# Patient Record
Sex: FEMALE | Race: WHITE | Employment: UNEMPLOYED
[De-identification: names, ages, dates, MRNs, and addresses within clinical notes are randomized per-mention and may not be internally consistent; named-entity substitution may affect disease eponyms.]

---

## 2017-02-01 ENCOUNTER — OFFICE VISIT (OUTPATIENT)
Dept: FAMILY MEDICINE CLINIC | Facility: CLINIC | Age: 37
End: 2017-02-01

## 2017-02-01 VITALS
DIASTOLIC BLOOD PRESSURE: 60 MMHG | BODY MASS INDEX: 34.22 KG/M2 | OXYGEN SATURATION: 95 % | WEIGHT: 239 LBS | HEART RATE: 60 BPM | SYSTOLIC BLOOD PRESSURE: 122 MMHG | TEMPERATURE: 97.6 F | HEIGHT: 70 IN

## 2017-02-01 DIAGNOSIS — F41.9 ANXIETY: Primary | ICD-10-CM

## 2017-02-01 DIAGNOSIS — N89.8 VAGINAL ODOR: ICD-10-CM

## 2017-02-01 DIAGNOSIS — N76.0 ACUTE VAGINITIS: ICD-10-CM

## 2017-02-01 PROCEDURE — 99214 OFFICE O/P EST MOD 30 MIN: CPT | Performed by: PHYSICIAN ASSISTANT

## 2017-02-01 RX ORDER — METRONIDAZOLE 500 MG/1
500 TABLET ORAL 2 TIMES DAILY
Qty: 14 TABLET | Refills: 0 | Status: SHIPPED | OUTPATIENT
Start: 2017-02-01 | End: 2017-02-08

## 2017-02-01 RX ORDER — ESCITALOPRAM OXALATE 10 MG/1
10 TABLET ORAL DAILY
Qty: 30 TABLET | Refills: 3 | Status: SHIPPED | OUTPATIENT
Start: 2017-02-01 | End: 2017-05-31

## 2017-02-01 RX ORDER — ALPRAZOLAM 0.5 MG/1
0.5 TABLET ORAL DAILY PRN
Qty: 30 TABLET | Refills: 0 | Status: SHIPPED | OUTPATIENT
Start: 2017-02-01 | End: 2017-03-09 | Stop reason: SDUPTHER

## 2017-02-01 ASSESSMENT — ENCOUNTER SYMPTOMS
NAUSEA: 0
SHORTNESS OF BREATH: 0
CONSTIPATION: 0
COUGH: 0
ABDOMINAL PAIN: 0
WHEEZING: 0
VISUAL CHANGE: 0
COLOR CHANGE: 0
DIARRHEA: 0
VOMITING: 0

## 2017-02-02 ENCOUNTER — TELEPHONE (OUTPATIENT)
Dept: FAMILY MEDICINE CLINIC | Facility: CLINIC | Age: 37
End: 2017-02-02

## 2017-03-05 ENCOUNTER — HOSPITAL ENCOUNTER (EMERGENCY)
Age: 37
Discharge: HOME | End: 2017-03-05
Attending: EMERGENCY MEDICINE
Payer: MEDICARE

## 2017-03-05 ENCOUNTER — APPOINTMENT (OUTPATIENT)
Dept: CT IMAGING | Age: 37
End: 2017-03-05
Payer: MEDICARE

## 2017-03-05 VITALS
HEIGHT: 69 IN | DIASTOLIC BLOOD PRESSURE: 76 MMHG | TEMPERATURE: 98 F | HEART RATE: 55 BPM | OXYGEN SATURATION: 99 % | SYSTOLIC BLOOD PRESSURE: 132 MMHG | BODY MASS INDEX: 33.18 KG/M2 | RESPIRATION RATE: 20 BRPM | WEIGHT: 224 LBS

## 2017-03-05 DIAGNOSIS — M43.6 TORTICOLLIS: Primary | ICD-10-CM

## 2017-03-05 LAB
-: NORMAL
HCG, PREGNANCY URINE (POC): NEGATIVE
PREGNANCY TEST URINE, POC: NEGATIVE

## 2017-03-05 PROCEDURE — 84703 CHORIONIC GONADOTROPIN ASSAY: CPT

## 2017-03-05 PROCEDURE — 70450 CT HEAD/BRAIN W/O DYE: CPT

## 2017-03-05 PROCEDURE — 6370000000 HC RX 637 (ALT 250 FOR IP): Performed by: NURSE PRACTITIONER

## 2017-03-05 PROCEDURE — 99284 EMERGENCY DEPT VISIT MOD MDM: CPT

## 2017-03-05 PROCEDURE — 6360000002 HC RX W HCPCS: Performed by: NURSE PRACTITIONER

## 2017-03-05 RX ORDER — ONDANSETRON 4 MG/1
4 TABLET, ORALLY DISINTEGRATING ORAL ONCE
Status: COMPLETED | OUTPATIENT
Start: 2017-03-05 | End: 2017-03-05

## 2017-03-05 RX ORDER — TRAMADOL HYDROCHLORIDE 50 MG/1
50 TABLET ORAL EVERY 6 HOURS PRN
Qty: 15 TABLET | Refills: 0 | Status: SHIPPED | OUTPATIENT
Start: 2017-03-05 | End: 2017-03-09 | Stop reason: SDUPTHER

## 2017-03-05 RX ORDER — DIAZEPAM 5 MG/1
5 TABLET ORAL ONCE
Status: COMPLETED | OUTPATIENT
Start: 2017-03-05 | End: 2017-03-05

## 2017-03-05 RX ORDER — OXYCODONE HYDROCHLORIDE AND ACETAMINOPHEN 5; 325 MG/1; MG/1
1 TABLET ORAL ONCE
Status: COMPLETED | OUTPATIENT
Start: 2017-03-05 | End: 2017-03-05

## 2017-03-05 RX ORDER — CYCLOBENZAPRINE HCL 10 MG
10 TABLET ORAL 3 TIMES DAILY PRN
Qty: 15 TABLET | Refills: 0 | Status: SHIPPED | OUTPATIENT
Start: 2017-03-05 | End: 2017-03-09 | Stop reason: ALTCHOICE

## 2017-03-05 RX ADMIN — OXYCODONE HYDROCHLORIDE AND ACETAMINOPHEN 1 TABLET: 5; 325 TABLET ORAL at 16:20

## 2017-03-05 RX ADMIN — DIAZEPAM 5 MG: 5 TABLET ORAL at 16:20

## 2017-03-05 RX ADMIN — ONDANSETRON 4 MG: 4 TABLET, ORALLY DISINTEGRATING ORAL at 16:20

## 2017-03-05 ASSESSMENT — ENCOUNTER SYMPTOMS
NAUSEA: 0
COUGH: 0
SHORTNESS OF BREATH: 0
TROUBLE SWALLOWING: 0
VOMITING: 0
BACK PAIN: 0

## 2017-03-05 ASSESSMENT — PAIN SCALES - GENERAL
PAINLEVEL_OUTOF10: 8
PAINLEVEL_OUTOF10: 8

## 2017-03-06 ENCOUNTER — APPOINTMENT (OUTPATIENT)
Dept: CT IMAGING | Age: 37
End: 2017-03-06
Payer: MEDICARE

## 2017-03-06 ENCOUNTER — HOSPITAL ENCOUNTER (EMERGENCY)
Age: 37
Discharge: HOME | End: 2017-03-07
Attending: EMERGENCY MEDICINE
Payer: MEDICARE

## 2017-03-06 VITALS
HEIGHT: 69 IN | SYSTOLIC BLOOD PRESSURE: 136 MMHG | DIASTOLIC BLOOD PRESSURE: 74 MMHG | OXYGEN SATURATION: 100 % | HEART RATE: 91 BPM | WEIGHT: 224 LBS | TEMPERATURE: 97.7 F | RESPIRATION RATE: 16 BRPM | BODY MASS INDEX: 33.18 KG/M2

## 2017-03-06 DIAGNOSIS — G44.209 ACUTE NON INTRACTABLE TENSION-TYPE HEADACHE: ICD-10-CM

## 2017-03-06 DIAGNOSIS — M43.6 TORTICOLLIS: Primary | ICD-10-CM

## 2017-03-06 LAB
ABSOLUTE EOS #: 0.2 K/UL (ref 0–0.4)
ABSOLUTE LYMPH #: 1.4 K/UL (ref 1–4.8)
ABSOLUTE MONO #: 0.6 K/UL (ref 0.1–1.3)
ANION GAP SERPL CALCULATED.3IONS-SCNC: 12 MMOL/L (ref 9–17)
BASOPHILS # BLD: 0 % (ref 0–2)
BASOPHILS ABSOLUTE: 0 K/UL (ref 0–0.2)
BUN BLDV-MCNC: 8 MG/DL (ref 6–20)
BUN/CREAT BLD: ABNORMAL (ref 9–20)
C-REACTIVE PROTEIN: 5 MG/L (ref 0–5)
CALCIUM SERPL-MCNC: 9 MG/DL (ref 8.6–10.4)
CHLORIDE BLD-SCNC: 102 MMOL/L (ref 98–107)
CO2: 25 MMOL/L (ref 20–31)
CREAT SERPL-MCNC: 0.48 MG/DL (ref 0.5–0.9)
DIFFERENTIAL TYPE: ABNORMAL
EOSINOPHILS RELATIVE PERCENT: 2 % (ref 0–4)
GFR AFRICAN AMERICAN: >60 ML/MIN
GFR NON-AFRICAN AMERICAN: >60 ML/MIN
GFR SERPL CREATININE-BSD FRML MDRD: ABNORMAL ML/MIN/{1.73_M2}
GFR SERPL CREATININE-BSD FRML MDRD: ABNORMAL ML/MIN/{1.73_M2}
GLUCOSE BLD-MCNC: 99 MG/DL (ref 70–99)
HCT VFR BLD CALC: 36.4 % (ref 36–46)
HEMOGLOBIN: 12.7 G/DL (ref 12–16)
LYMPHOCYTES # BLD: 16 % (ref 24–44)
MCH RBC QN AUTO: 32.4 PG (ref 26–34)
MCHC RBC AUTO-ENTMCNC: 34.8 G/DL (ref 31–37)
MCV RBC AUTO: 93.1 FL (ref 80–100)
MONOCYTES # BLD: 7 % (ref 1–7)
PDW BLD-RTO: 13.8 % (ref 11.5–14.9)
PLATELET # BLD: 203 K/UL (ref 150–450)
PLATELET ESTIMATE: ABNORMAL
PMV BLD AUTO: 10.5 FL (ref 6–12)
POTASSIUM SERPL-SCNC: 3.8 MMOL/L (ref 3.7–5.3)
RBC # BLD: 3.91 M/UL (ref 4–5.2)
RBC # BLD: ABNORMAL 10*6/UL
SEG NEUTROPHILS: 75 % (ref 36–66)
SEGMENTED NEUTROPHILS ABSOLUTE COUNT: 6.4 K/UL (ref 1.3–9.1)
SODIUM BLD-SCNC: 139 MMOL/L (ref 135–144)
WBC # BLD: 8.7 K/UL (ref 3.5–11)
WBC # BLD: ABNORMAL 10*3/UL

## 2017-03-06 PROCEDURE — 70496 CT ANGIOGRAPHY HEAD: CPT

## 2017-03-06 PROCEDURE — 80048 BASIC METABOLIC PNL TOTAL CA: CPT

## 2017-03-06 PROCEDURE — 6360000002 HC RX W HCPCS: Performed by: PHYSICIAN ASSISTANT

## 2017-03-06 PROCEDURE — 85025 COMPLETE CBC W/AUTO DIFF WBC: CPT

## 2017-03-06 PROCEDURE — 36415 COLL VENOUS BLD VENIPUNCTURE: CPT

## 2017-03-06 PROCEDURE — 96375 TX/PRO/DX INJ NEW DRUG ADDON: CPT

## 2017-03-06 PROCEDURE — 70498 CT ANGIOGRAPHY NECK: CPT

## 2017-03-06 PROCEDURE — 86140 C-REACTIVE PROTEIN: CPT

## 2017-03-06 PROCEDURE — 72125 CT NECK SPINE W/O DYE: CPT

## 2017-03-06 PROCEDURE — 6360000004 HC RX CONTRAST MEDICATION: Performed by: PHYSICIAN ASSISTANT

## 2017-03-06 PROCEDURE — 2580000003 HC RX 258: Performed by: PHYSICIAN ASSISTANT

## 2017-03-06 PROCEDURE — 96376 TX/PRO/DX INJ SAME DRUG ADON: CPT

## 2017-03-06 PROCEDURE — 96372 THER/PROPH/DIAG INJ SC/IM: CPT

## 2017-03-06 PROCEDURE — 96374 THER/PROPH/DIAG INJ IV PUSH: CPT

## 2017-03-06 PROCEDURE — 99284 EMERGENCY DEPT VISIT MOD MDM: CPT

## 2017-03-06 RX ORDER — SODIUM CHLORIDE 0.9 % (FLUSH) 0.9 %
10 SYRINGE (ML) INJECTION PRN
Status: DISCONTINUED | OUTPATIENT
Start: 2017-03-06 | End: 2017-03-07 | Stop reason: HOSPADM

## 2017-03-06 RX ORDER — FENTANYL CITRATE 50 UG/ML
50 INJECTION, SOLUTION INTRAMUSCULAR; INTRAVENOUS ONCE
Status: COMPLETED | OUTPATIENT
Start: 2017-03-06 | End: 2017-03-06

## 2017-03-06 RX ORDER — DIAZEPAM 5 MG/ML
5 INJECTION, SOLUTION INTRAMUSCULAR; INTRAVENOUS ONCE
Status: COMPLETED | OUTPATIENT
Start: 2017-03-06 | End: 2017-03-06

## 2017-03-06 RX ORDER — ORPHENADRINE CITRATE 100 MG/1
100 TABLET, EXTENDED RELEASE ORAL 2 TIMES DAILY
Qty: 15 TABLET | Refills: 0 | Status: SHIPPED | OUTPATIENT
Start: 2017-03-06 | End: 2017-03-09 | Stop reason: SDUPTHER

## 2017-03-06 RX ORDER — ONDANSETRON 2 MG/ML
4 INJECTION INTRAMUSCULAR; INTRAVENOUS ONCE
Status: COMPLETED | OUTPATIENT
Start: 2017-03-06 | End: 2017-03-06

## 2017-03-06 RX ORDER — 0.9 % SODIUM CHLORIDE 0.9 %
100 INTRAVENOUS SOLUTION INTRAVENOUS ONCE
Status: COMPLETED | OUTPATIENT
Start: 2017-03-06 | End: 2017-03-06

## 2017-03-06 RX ORDER — FENTANYL CITRATE 50 UG/ML
100 INJECTION, SOLUTION INTRAMUSCULAR; INTRAVENOUS ONCE
Status: DISCONTINUED | OUTPATIENT
Start: 2017-03-06 | End: 2017-03-06

## 2017-03-06 RX ORDER — OXYCODONE HYDROCHLORIDE AND ACETAMINOPHEN 5; 325 MG/1; MG/1
1 TABLET ORAL EVERY 4 HOURS PRN
Qty: 10 TABLET | Refills: 0 | Status: SHIPPED | OUTPATIENT
Start: 2017-03-06 | End: 2017-03-09 | Stop reason: ALTCHOICE

## 2017-03-06 RX ORDER — ORPHENADRINE CITRATE 30 MG/ML
60 INJECTION INTRAMUSCULAR; INTRAVENOUS ONCE
Status: COMPLETED | OUTPATIENT
Start: 2017-03-06 | End: 2017-03-06

## 2017-03-06 RX ORDER — MORPHINE SULFATE 4 MG/ML
4 INJECTION, SOLUTION INTRAMUSCULAR; INTRAVENOUS ONCE
Status: COMPLETED | OUTPATIENT
Start: 2017-03-06 | End: 2017-03-06

## 2017-03-06 RX ORDER — NAPROXEN 500 MG/1
500 TABLET ORAL 2 TIMES DAILY
Qty: 30 TABLET | Refills: 0 | Status: SHIPPED | OUTPATIENT
Start: 2017-03-06 | End: 2017-03-06 | Stop reason: CLARIF

## 2017-03-06 RX ORDER — ONDANSETRON 4 MG/1
4 TABLET, ORALLY DISINTEGRATING ORAL EVERY 8 HOURS PRN
Qty: 8 TABLET | Refills: 0 | Status: SHIPPED | OUTPATIENT
Start: 2017-03-06 | End: 2017-03-09 | Stop reason: SDUPTHER

## 2017-03-06 RX ORDER — 0.9 % SODIUM CHLORIDE 0.9 %
1000 INTRAVENOUS SOLUTION INTRAVENOUS ONCE
Status: COMPLETED | OUTPATIENT
Start: 2017-03-06 | End: 2017-03-06

## 2017-03-06 RX ORDER — ACETAMINOPHEN AND CODEINE PHOSPHATE 300; 30 MG/1; MG/1
1 TABLET ORAL EVERY 4 HOURS PRN
Qty: 10 TABLET | Refills: 0 | Status: SHIPPED | OUTPATIENT
Start: 2017-03-06 | End: 2017-03-06 | Stop reason: CLARIF

## 2017-03-06 RX ADMIN — SODIUM CHLORIDE 100 ML: 9 INJECTION, SOLUTION INTRAVENOUS at 22:58

## 2017-03-06 RX ADMIN — MORPHINE SULFATE 4 MG: 4 INJECTION, SOLUTION INTRAMUSCULAR; INTRAVENOUS at 20:26

## 2017-03-06 RX ADMIN — SODIUM CHLORIDE 1000 ML: 9 INJECTION, SOLUTION INTRAVENOUS at 23:10

## 2017-03-06 RX ADMIN — ORPHENADRINE CITRATE 60 MG: 30 INJECTION INTRAMUSCULAR; INTRAVENOUS at 23:01

## 2017-03-06 RX ADMIN — Medication 10 ML: at 22:59

## 2017-03-06 RX ADMIN — FENTANYL CITRATE 50 MCG: 50 INJECTION INTRAMUSCULAR; INTRAVENOUS at 21:34

## 2017-03-06 RX ADMIN — FENTANYL CITRATE 50 MCG: 50 INJECTION INTRAMUSCULAR; INTRAVENOUS at 23:01

## 2017-03-06 RX ADMIN — ONDANSETRON 4 MG: 2 INJECTION INTRAMUSCULAR; INTRAVENOUS at 23:10

## 2017-03-06 RX ADMIN — DIAZEPAM 5 MG: 5 INJECTION, SOLUTION INTRAMUSCULAR; INTRAVENOUS at 20:27

## 2017-03-06 RX ADMIN — IOVERSOL 100 ML: 741 INJECTION INTRA-ARTERIAL; INTRAVENOUS at 22:58

## 2017-03-06 ASSESSMENT — PAIN SCALES - GENERAL
PAINLEVEL_OUTOF10: 10

## 2017-03-06 ASSESSMENT — PAIN DESCRIPTION - LOCATION: LOCATION: NECK

## 2017-03-06 ASSESSMENT — PAIN DESCRIPTION - PAIN TYPE: TYPE: ACUTE PAIN

## 2017-03-07 ASSESSMENT — ENCOUNTER SYMPTOMS
COUGH: 0
SHORTNESS OF BREATH: 0
BACK PAIN: 0
VOMITING: 0
SORE THROAT: 0
NAUSEA: 0
ABDOMINAL PAIN: 0

## 2017-03-09 ENCOUNTER — OFFICE VISIT (OUTPATIENT)
Dept: FAMILY MEDICINE CLINIC | Facility: CLINIC | Age: 37
End: 2017-03-09

## 2017-03-09 VITALS
HEIGHT: 70 IN | DIASTOLIC BLOOD PRESSURE: 80 MMHG | BODY MASS INDEX: 33.07 KG/M2 | WEIGHT: 231 LBS | OXYGEN SATURATION: 98 % | SYSTOLIC BLOOD PRESSURE: 132 MMHG | HEART RATE: 74 BPM

## 2017-03-09 DIAGNOSIS — M43.6 TORTICOLLIS: Primary | ICD-10-CM

## 2017-03-09 DIAGNOSIS — F41.9 ANXIETY: ICD-10-CM

## 2017-03-09 PROCEDURE — 99214 OFFICE O/P EST MOD 30 MIN: CPT | Performed by: PHYSICIAN ASSISTANT

## 2017-03-09 RX ORDER — ORPHENADRINE CITRATE 100 MG/1
100 TABLET, EXTENDED RELEASE ORAL 2 TIMES DAILY
Qty: 30 TABLET | Refills: 0 | Status: SHIPPED | OUTPATIENT
Start: 2017-03-09 | End: 2017-05-31

## 2017-03-09 RX ORDER — TRAMADOL HYDROCHLORIDE 50 MG/1
50 TABLET ORAL EVERY 6 HOURS PRN
Qty: 60 TABLET | Refills: 0 | Status: SHIPPED | OUTPATIENT
Start: 2017-03-09 | End: 2017-05-31

## 2017-03-09 RX ORDER — ALPRAZOLAM 0.5 MG/1
0.5 TABLET ORAL DAILY PRN
Qty: 30 TABLET | Refills: 0 | Status: SHIPPED | OUTPATIENT
Start: 2017-03-09 | End: 2017-04-08

## 2017-03-09 RX ORDER — ONDANSETRON 4 MG/1
4 TABLET, ORALLY DISINTEGRATING ORAL EVERY 8 HOURS PRN
Qty: 30 TABLET | Refills: 0 | Status: SHIPPED | OUTPATIENT
Start: 2017-03-09 | End: 2017-05-31

## 2017-03-09 ASSESSMENT — ENCOUNTER SYMPTOMS
ABDOMINAL PAIN: 0
NAUSEA: 0
CONSTIPATION: 0
VOMITING: 0
VISUAL CHANGE: 0
TROUBLE SWALLOWING: 0
COUGH: 0
DIARRHEA: 0
SHORTNESS OF BREATH: 0
COLOR CHANGE: 0

## 2017-04-13 ENCOUNTER — TELEPHONE (OUTPATIENT)
Dept: FAMILY MEDICINE CLINIC | Age: 37
End: 2017-04-13

## 2017-04-13 RX ORDER — IBUPROFEN 800 MG/1
800 TABLET ORAL EVERY 8 HOURS PRN
Qty: 30 TABLET | Refills: 0 | Status: SHIPPED | OUTPATIENT
Start: 2017-04-13 | End: 2017-04-13 | Stop reason: CLARIF

## 2017-04-20 ENCOUNTER — APPOINTMENT (OUTPATIENT)
Dept: CT IMAGING | Age: 37
End: 2017-04-20
Payer: MEDICARE

## 2017-04-20 ENCOUNTER — HOSPITAL ENCOUNTER (EMERGENCY)
Age: 37
Discharge: HOME | End: 2017-04-20
Attending: EMERGENCY MEDICINE
Payer: MEDICARE

## 2017-04-20 VITALS
RESPIRATION RATE: 22 BRPM | BODY MASS INDEX: 30.66 KG/M2 | DIASTOLIC BLOOD PRESSURE: 121 MMHG | SYSTOLIC BLOOD PRESSURE: 171 MMHG | WEIGHT: 207 LBS | HEIGHT: 69 IN | OXYGEN SATURATION: 95 % | HEART RATE: 99 BPM

## 2017-04-20 DIAGNOSIS — R82.71 BACTERIA IN URINE: ICD-10-CM

## 2017-04-20 DIAGNOSIS — N13.4 HYDROURETER: Primary | ICD-10-CM

## 2017-04-20 DIAGNOSIS — N20.1 URETEROLITHIASIS: ICD-10-CM

## 2017-04-20 LAB
-: ABNORMAL
ABSOLUTE EOS #: 0.1 K/UL (ref 0–0.4)
ABSOLUTE LYMPH #: 1.7 K/UL (ref 1–4.8)
ABSOLUTE MONO #: 0.5 K/UL (ref 0.1–1.2)
ALBUMIN SERPL-MCNC: 4.1 G/DL (ref 3.5–5.2)
ALBUMIN/GLOBULIN RATIO: 1.2 (ref 1–2.5)
ALP BLD-CCNC: 66 U/L (ref 35–104)
ALT SERPL-CCNC: 14 U/L (ref 5–33)
AMORPHOUS: ABNORMAL
ANION GAP SERPL CALCULATED.3IONS-SCNC: 13 MMOL/L (ref 9–17)
AST SERPL-CCNC: 23 U/L
BACTERIA: ABNORMAL
BASOPHILS # BLD: 1 % (ref 0–2)
BASOPHILS ABSOLUTE: 0.1 K/UL (ref 0–0.2)
BILIRUB SERPL-MCNC: 0.54 MG/DL (ref 0.3–1.2)
BILIRUBIN URINE: NEGATIVE
BUN BLDV-MCNC: 11 MG/DL (ref 6–20)
BUN/CREAT BLD: ABNORMAL (ref 9–20)
CALCIUM SERPL-MCNC: 9.5 MG/DL (ref 8.6–10.4)
CASTS UA: ABNORMAL /LPF (ref 0–8)
CHLORIDE BLD-SCNC: 101 MMOL/L (ref 98–107)
CO2: 26 MMOL/L (ref 20–31)
COLOR: ABNORMAL
COMMENT UA: ABNORMAL
CREAT SERPL-MCNC: 0.57 MG/DL (ref 0.5–0.9)
CRYSTALS, UA: ABNORMAL /HPF
DIFFERENTIAL TYPE: ABNORMAL
EOSINOPHILS RELATIVE PERCENT: 2 % (ref 1–4)
EPITHELIAL CELLS UA: ABNORMAL /HPF (ref 0–5)
GFR AFRICAN AMERICAN: >60 ML/MIN
GFR NON-AFRICAN AMERICAN: >60 ML/MIN
GFR SERPL CREATININE-BSD FRML MDRD: ABNORMAL ML/MIN/{1.73_M2}
GFR SERPL CREATININE-BSD FRML MDRD: ABNORMAL ML/MIN/{1.73_M2}
GLUCOSE BLD-MCNC: 102 MG/DL (ref 70–99)
GLUCOSE URINE: NEGATIVE
HCG QUALITATIVE: NEGATIVE
HCT VFR BLD CALC: 41.8 % (ref 36–46)
HEMOGLOBIN: 14.1 G/DL (ref 12–16)
KETONES, URINE: ABNORMAL
LEUKOCYTE ESTERASE, URINE: ABNORMAL
LYMPHOCYTES # BLD: 24 % (ref 24–44)
MCH RBC QN AUTO: 31.4 PG (ref 26–34)
MCHC RBC AUTO-ENTMCNC: 33.8 G/DL (ref 31–37)
MCV RBC AUTO: 92.7 FL (ref 80–100)
MONOCYTES # BLD: 6 % (ref 2–11)
MUCUS: ABNORMAL
NITRITE, URINE: NEGATIVE
OTHER OBSERVATIONS UA: ABNORMAL
PDW BLD-RTO: 13.9 % (ref 12.5–15.4)
PH UA: 5.5 (ref 5–8)
PLATELET # BLD: 264 K/UL (ref 140–450)
PLATELET ESTIMATE: ABNORMAL
PMV BLD AUTO: 9.7 FL (ref 6–12)
POTASSIUM SERPL-SCNC: 3.7 MMOL/L (ref 3.7–5.3)
PROTEIN UA: ABNORMAL
RBC # BLD: 4.51 M/UL (ref 4–5.2)
RBC # BLD: ABNORMAL 10*6/UL
RBC UA: ABNORMAL /HPF (ref 0–4)
RENAL EPITHELIAL, UA: ABNORMAL /HPF
SEG NEUTROPHILS: 67 % (ref 36–66)
SEGMENTED NEUTROPHILS ABSOLUTE COUNT: 4.7 K/UL (ref 1.8–7.7)
SODIUM BLD-SCNC: 140 MMOL/L (ref 135–144)
SPECIFIC GRAVITY UA: 1.03 (ref 1–1.03)
TOTAL PROTEIN: 7.6 G/DL (ref 6.4–8.3)
TRICHOMONAS: ABNORMAL
TURBIDITY: ABNORMAL
URINE HGB: ABNORMAL
UROBILINOGEN, URINE: NORMAL
WBC # BLD: 7 K/UL (ref 3.5–11)
WBC # BLD: ABNORMAL 10*3/UL
WBC UA: ABNORMAL /HPF (ref 0–5)
YEAST: ABNORMAL

## 2017-04-20 PROCEDURE — 80053 COMPREHEN METABOLIC PANEL: CPT

## 2017-04-20 PROCEDURE — 84703 CHORIONIC GONADOTROPIN ASSAY: CPT

## 2017-04-20 PROCEDURE — 96374 THER/PROPH/DIAG INJ IV PUSH: CPT

## 2017-04-20 PROCEDURE — 96375 TX/PRO/DX INJ NEW DRUG ADDON: CPT

## 2017-04-20 PROCEDURE — 6370000000 HC RX 637 (ALT 250 FOR IP): Performed by: EMERGENCY MEDICINE

## 2017-04-20 PROCEDURE — 81001 URINALYSIS AUTO W/SCOPE: CPT

## 2017-04-20 PROCEDURE — 85025 COMPLETE CBC W/AUTO DIFF WBC: CPT

## 2017-04-20 PROCEDURE — G0384 LEV 5 HOSP TYPE B ED VISIT: HCPCS

## 2017-04-20 PROCEDURE — 87086 URINE CULTURE/COLONY COUNT: CPT

## 2017-04-20 PROCEDURE — 2580000003 HC RX 258: Performed by: EMERGENCY MEDICINE

## 2017-04-20 PROCEDURE — 74176 CT ABD & PELVIS W/O CONTRAST: CPT

## 2017-04-20 PROCEDURE — 6360000002 HC RX W HCPCS: Performed by: EMERGENCY MEDICINE

## 2017-04-20 RX ORDER — KETOROLAC TROMETHAMINE 30 MG/ML
30 INJECTION, SOLUTION INTRAMUSCULAR; INTRAVENOUS ONCE
Status: COMPLETED | OUTPATIENT
Start: 2017-04-20 | End: 2017-04-20

## 2017-04-20 RX ORDER — ONDANSETRON 4 MG/1
4 TABLET, FILM COATED ORAL EVERY 8 HOURS PRN
Qty: 8 TABLET | Refills: 0 | Status: SHIPPED | OUTPATIENT
Start: 2017-04-20 | End: 2017-05-31

## 2017-04-20 RX ORDER — OXYCODONE HYDROCHLORIDE AND ACETAMINOPHEN 5; 325 MG/1; MG/1
2 TABLET ORAL ONCE
Status: COMPLETED | OUTPATIENT
Start: 2017-04-20 | End: 2017-04-20

## 2017-04-20 RX ORDER — CIPROFLOXACIN 500 MG/1
500 TABLET, FILM COATED ORAL 2 TIMES DAILY
Qty: 14 TABLET | Refills: 0 | Status: SHIPPED | OUTPATIENT
Start: 2017-04-20 | End: 2017-04-27

## 2017-04-20 RX ORDER — DIPHENHYDRAMINE HYDROCHLORIDE 50 MG/ML
12.5 INJECTION INTRAMUSCULAR; INTRAVENOUS ONCE
Status: COMPLETED | OUTPATIENT
Start: 2017-04-20 | End: 2017-04-20

## 2017-04-20 RX ORDER — OXYCODONE HYDROCHLORIDE AND ACETAMINOPHEN 5; 325 MG/1; MG/1
1-2 TABLET ORAL EVERY 6 HOURS PRN
Qty: 20 TABLET | Refills: 0 | Status: SHIPPED | OUTPATIENT
Start: 2017-04-20 | End: 2017-05-31

## 2017-04-20 RX ORDER — ONDANSETRON 2 MG/ML
4 INJECTION INTRAMUSCULAR; INTRAVENOUS ONCE
Status: COMPLETED | OUTPATIENT
Start: 2017-04-20 | End: 2017-04-20

## 2017-04-20 RX ORDER — 0.9 % SODIUM CHLORIDE 0.9 %
1000 INTRAVENOUS SOLUTION INTRAVENOUS ONCE
Status: COMPLETED | OUTPATIENT
Start: 2017-04-20 | End: 2017-04-20

## 2017-04-20 RX ADMIN — KETOROLAC TROMETHAMINE 30 MG: 30 INJECTION, SOLUTION INTRAMUSCULAR at 11:40

## 2017-04-20 RX ADMIN — ONDANSETRON 4 MG: 2 INJECTION, SOLUTION INTRAMUSCULAR; INTRAVENOUS at 11:40

## 2017-04-20 RX ADMIN — SODIUM CHLORIDE 1000 ML: 9 INJECTION, SOLUTION INTRAVENOUS at 11:40

## 2017-04-20 RX ADMIN — DIPHENHYDRAMINE HYDROCHLORIDE 12.5 MG: 50 INJECTION, SOLUTION INTRAMUSCULAR; INTRAVENOUS at 12:02

## 2017-04-20 RX ADMIN — PROCHLORPERAZINE EDISYLATE 10 MG: 5 INJECTION INTRAMUSCULAR; INTRAVENOUS at 12:02

## 2017-04-20 RX ADMIN — OXYCODONE HYDROCHLORIDE AND ACETAMINOPHEN 2 TABLET: 5; 325 TABLET ORAL at 13:37

## 2017-04-20 ASSESSMENT — PAIN DESCRIPTION - LOCATION: LOCATION: FLANK

## 2017-04-20 ASSESSMENT — ENCOUNTER SYMPTOMS
NAUSEA: 1
ABDOMINAL PAIN: 1
COUGH: 0
DIARRHEA: 0
VOMITING: 1
WHEEZING: 0
CHEST TIGHTNESS: 0

## 2017-04-20 ASSESSMENT — PAIN DESCRIPTION - PROGRESSION
CLINICAL_PROGRESSION: GRADUALLY IMPROVING
CLINICAL_PROGRESSION: RAPIDLY IMPROVING

## 2017-04-20 ASSESSMENT — PAIN DESCRIPTION - PAIN TYPE: TYPE: ACUTE PAIN

## 2017-04-20 ASSESSMENT — PAIN SCALES - GENERAL
PAINLEVEL_OUTOF10: 10
PAINLEVEL_OUTOF10: 8
PAINLEVEL_OUTOF10: 9

## 2017-04-20 ASSESSMENT — PAIN DESCRIPTION - ORIENTATION: ORIENTATION: LEFT

## 2017-04-21 LAB
CULTURE: NORMAL
CULTURE: NORMAL
Lab: NORMAL
SPECIMEN DESCRIPTION: NORMAL
STATUS: NORMAL

## 2017-05-15 ENCOUNTER — TELEPHONE (OUTPATIENT)
Dept: FAMILY MEDICINE CLINIC | Age: 37
End: 2017-05-15

## 2017-05-15 RX ORDER — METRONIDAZOLE 500 MG/1
500 TABLET ORAL 2 TIMES DAILY
Qty: 14 TABLET | Refills: 0 | Status: SHIPPED | OUTPATIENT
Start: 2017-05-15 | End: 2017-05-22

## 2017-05-31 ENCOUNTER — HOSPITAL ENCOUNTER (EMERGENCY)
Age: 37
Discharge: HOME | End: 2017-05-31
Attending: EMERGENCY MEDICINE
Payer: MEDICAID

## 2017-05-31 ENCOUNTER — OFFICE VISIT (OUTPATIENT)
Dept: FAMILY MEDICINE CLINIC | Age: 37
End: 2017-05-31
Payer: MEDICAID

## 2017-05-31 VITALS
OXYGEN SATURATION: 98 % | HEIGHT: 70 IN | DIASTOLIC BLOOD PRESSURE: 71 MMHG | TEMPERATURE: 98.1 F | HEART RATE: 60 BPM | SYSTOLIC BLOOD PRESSURE: 116 MMHG | WEIGHT: 195 LBS | BODY MASS INDEX: 27.92 KG/M2 | RESPIRATION RATE: 20 BRPM

## 2017-05-31 VITALS
OXYGEN SATURATION: 97 % | HEART RATE: 58 BPM | HEIGHT: 69 IN | WEIGHT: 199.2 LBS | BODY MASS INDEX: 29.51 KG/M2 | SYSTOLIC BLOOD PRESSURE: 110 MMHG | DIASTOLIC BLOOD PRESSURE: 68 MMHG

## 2017-05-31 DIAGNOSIS — R10.9 LEFT FLANK PAIN: Primary | ICD-10-CM

## 2017-05-31 DIAGNOSIS — F41.9 ANXIETY: Primary | ICD-10-CM

## 2017-05-31 LAB
-: NORMAL
AMORPHOUS: NORMAL
BACTERIA: NORMAL
BILIRUBIN URINE: NEGATIVE
CASTS UA: NORMAL /LPF (ref 0–8)
CHP ED QC CHECK: YES
COLOR: YELLOW
CRYSTALS, UA: NORMAL /HPF
EPITHELIAL CELLS UA: NORMAL /HPF (ref 0–5)
GLUCOSE URINE: NEGATIVE
KETONES, URINE: NEGATIVE
LEUKOCYTE ESTERASE, URINE: NEGATIVE
MUCUS: NORMAL
NITRITE, URINE: NEGATIVE
OTHER OBSERVATIONS UA: NORMAL
PH UA: 6 (ref 5–8)
PREGNANCY TEST URINE, POC: NEGATIVE
PROTEIN UA: NEGATIVE
RBC UA: NORMAL /HPF (ref 0–4)
RENAL EPITHELIAL, UA: NORMAL /HPF
SPECIFIC GRAVITY UA: 1.02 (ref 1–1.03)
TRICHOMONAS: NORMAL
TURBIDITY: CLEAR
URINE HGB: NEGATIVE
UROBILINOGEN, URINE: NORMAL
WBC UA: NORMAL /HPF (ref 0–5)
YEAST: NORMAL

## 2017-05-31 PROCEDURE — 6360000002 HC RX W HCPCS: Performed by: EMERGENCY MEDICINE

## 2017-05-31 PROCEDURE — 96372 THER/PROPH/DIAG INJ SC/IM: CPT

## 2017-05-31 PROCEDURE — 99213 OFFICE O/P EST LOW 20 MIN: CPT | Performed by: PHYSICIAN ASSISTANT

## 2017-05-31 PROCEDURE — 87086 URINE CULTURE/COLONY COUNT: CPT

## 2017-05-31 PROCEDURE — 6370000000 HC RX 637 (ALT 250 FOR IP): Performed by: EMERGENCY MEDICINE

## 2017-05-31 PROCEDURE — 81001 URINALYSIS AUTO W/SCOPE: CPT

## 2017-05-31 PROCEDURE — G0383 LEV 4 HOSP TYPE B ED VISIT: HCPCS

## 2017-05-31 RX ORDER — ONDANSETRON 2 MG/ML
4 INJECTION INTRAMUSCULAR; INTRAVENOUS ONCE
Status: COMPLETED | OUTPATIENT
Start: 2017-05-31 | End: 2017-05-31

## 2017-05-31 RX ORDER — ONDANSETRON 4 MG/1
4 TABLET, ORALLY DISINTEGRATING ORAL EVERY 8 HOURS PRN
Qty: 8 TABLET | Refills: 0 | Status: SHIPPED | OUTPATIENT
Start: 2017-05-31 | End: 2017-10-25 | Stop reason: SDUPTHER

## 2017-05-31 RX ORDER — OXYCODONE HYDROCHLORIDE AND ACETAMINOPHEN 5; 325 MG/1; MG/1
1 TABLET ORAL EVERY 8 HOURS PRN
Qty: 8 TABLET | Refills: 0 | Status: SHIPPED | OUTPATIENT
Start: 2017-05-31 | End: 2017-06-07

## 2017-05-31 RX ORDER — OXYCODONE HYDROCHLORIDE AND ACETAMINOPHEN 5; 325 MG/1; MG/1
2 TABLET ORAL ONCE
Status: COMPLETED | OUTPATIENT
Start: 2017-05-31 | End: 2017-05-31

## 2017-05-31 RX ORDER — KETOROLAC TROMETHAMINE 30 MG/ML
30 INJECTION, SOLUTION INTRAMUSCULAR; INTRAVENOUS ONCE
Status: COMPLETED | OUTPATIENT
Start: 2017-05-31 | End: 2017-05-31

## 2017-05-31 RX ADMIN — ONDANSETRON 4 MG: 2 INJECTION, SOLUTION INTRAMUSCULAR; INTRAVENOUS at 15:28

## 2017-05-31 RX ADMIN — OXYCODONE HYDROCHLORIDE AND ACETAMINOPHEN 2 TABLET: 5; 325 TABLET ORAL at 17:01

## 2017-05-31 RX ADMIN — KETOROLAC TROMETHAMINE 30 MG: 30 INJECTION, SOLUTION INTRAMUSCULAR at 15:27

## 2017-05-31 ASSESSMENT — PAIN SCALES - GENERAL
PAINLEVEL_OUTOF10: 7
PAINLEVEL_OUTOF10: 8
PAINLEVEL_OUTOF10: 8

## 2017-05-31 ASSESSMENT — ENCOUNTER SYMPTOMS
SHORTNESS OF BREATH: 0
ABDOMINAL PAIN: 0
SHORTNESS OF BREATH: 0
COUGH: 0
VISUAL CHANGE: 0
VOMITING: 0
NAUSEA: 0
CONSTIPATION: 0
COLOR CHANGE: 0
ABDOMINAL PAIN: 1
BACK PAIN: 1
DIARRHEA: 0
NAUSEA: 1
VOMITING: 0

## 2017-05-31 ASSESSMENT — PAIN DESCRIPTION - ONSET: ONSET: SUDDEN

## 2017-05-31 ASSESSMENT — PAIN DESCRIPTION - DESCRIPTORS: DESCRIPTORS: ACHING;CONSTANT;SHARP

## 2017-05-31 ASSESSMENT — PATIENT HEALTH QUESTIONNAIRE - PHQ9
SUM OF ALL RESPONSES TO PHQ9 QUESTIONS 1 & 2: 2
1. LITTLE INTEREST OR PLEASURE IN DOING THINGS: 1
SUM OF ALL RESPONSES TO PHQ QUESTIONS 1-9: 2
2. FEELING DOWN, DEPRESSED OR HOPELESS: 1

## 2017-05-31 ASSESSMENT — PAIN DESCRIPTION - ORIENTATION: ORIENTATION: LEFT

## 2017-05-31 ASSESSMENT — PAIN DESCRIPTION - PAIN TYPE: TYPE: ACUTE PAIN

## 2017-05-31 ASSESSMENT — PAIN DESCRIPTION - FREQUENCY: FREQUENCY: CONTINUOUS

## 2017-05-31 ASSESSMENT — PAIN DESCRIPTION - LOCATION: LOCATION: ABDOMEN;FLANK

## 2017-06-01 LAB
CULTURE: NO GROWTH
CULTURE: NORMAL
Lab: NORMAL
SPECIMEN DESCRIPTION: NORMAL
STATUS: NORMAL

## 2017-06-14 ENCOUNTER — TELEPHONE (OUTPATIENT)
Dept: FAMILY MEDICINE CLINIC | Age: 37
End: 2017-06-14

## 2017-07-29 ENCOUNTER — HOSPITAL ENCOUNTER (EMERGENCY)
Age: 37
Discharge: HOME | End: 2017-07-30
Attending: EMERGENCY MEDICINE
Payer: MEDICARE

## 2017-07-29 ENCOUNTER — APPOINTMENT (OUTPATIENT)
Dept: CT IMAGING | Age: 37
End: 2017-07-29
Payer: MEDICARE

## 2017-07-29 VITALS
RESPIRATION RATE: 21 BRPM | TEMPERATURE: 97 F | DIASTOLIC BLOOD PRESSURE: 74 MMHG | WEIGHT: 185 LBS | SYSTOLIC BLOOD PRESSURE: 137 MMHG | HEART RATE: 72 BPM | BODY MASS INDEX: 26.48 KG/M2 | HEIGHT: 70 IN | OXYGEN SATURATION: 98 %

## 2017-07-29 DIAGNOSIS — K63.89 EPIPLOIC APPENDAGITIS: ICD-10-CM

## 2017-07-29 DIAGNOSIS — R10.84 GENERALIZED ABDOMINAL PAIN: Primary | ICD-10-CM

## 2017-07-29 LAB
ABSOLUTE EOS #: 0.1 K/UL (ref 0–0.4)
ABSOLUTE LYMPH #: 1.9 K/UL (ref 1–4.8)
ABSOLUTE MONO #: 0.4 K/UL (ref 0.1–1.2)
ALBUMIN SERPL-MCNC: 4.1 G/DL (ref 3.5–5.2)
ALBUMIN/GLOBULIN RATIO: 1.5 (ref 1–2.5)
ALP BLD-CCNC: 68 U/L (ref 35–104)
ALT SERPL-CCNC: 15 U/L (ref 5–33)
ANION GAP SERPL CALCULATED.3IONS-SCNC: 10 MMOL/L (ref 9–17)
AST SERPL-CCNC: 16 U/L
BASOPHILS # BLD: 0 %
BASOPHILS ABSOLUTE: 0 K/UL (ref 0–0.2)
BILIRUB SERPL-MCNC: 0.33 MG/DL (ref 0.3–1.2)
BILIRUBIN DIRECT: 0.1 MG/DL
BILIRUBIN, INDIRECT: 0.23 MG/DL (ref 0–1)
BUN BLDV-MCNC: 14 MG/DL (ref 6–20)
BUN/CREAT BLD: ABNORMAL (ref 9–20)
CALCIUM SERPL-MCNC: 8.8 MG/DL (ref 8.6–10.4)
CHLORIDE BLD-SCNC: 102 MMOL/L (ref 98–107)
CO2: 27 MMOL/L (ref 20–31)
CREAT SERPL-MCNC: 0.94 MG/DL (ref 0.5–0.9)
DIFFERENTIAL TYPE: NORMAL
EOSINOPHILS RELATIVE PERCENT: 2 %
GFR AFRICAN AMERICAN: >60 ML/MIN
GFR NON-AFRICAN AMERICAN: >60 ML/MIN
GFR SERPL CREATININE-BSD FRML MDRD: ABNORMAL ML/MIN/{1.73_M2}
GFR SERPL CREATININE-BSD FRML MDRD: ABNORMAL ML/MIN/{1.73_M2}
GLOBULIN: NORMAL G/DL (ref 1.5–3.8)
GLUCOSE BLD-MCNC: 90 MG/DL (ref 70–99)
HCG QUALITATIVE: NEGATIVE
HCT VFR BLD CALC: 38.5 % (ref 36–46)
HEMOGLOBIN: 13.2 G/DL (ref 12–16)
LACTIC ACID, WHOLE BLOOD: 0.9 MMOL/L (ref 0.7–2.1)
LIPASE: 18 U/L (ref 13–60)
LYMPHOCYTES # BLD: 25 %
MCH RBC QN AUTO: 32.3 PG (ref 26–34)
MCHC RBC AUTO-ENTMCNC: 34.3 G/DL (ref 31–37)
MCV RBC AUTO: 94.1 FL (ref 80–100)
MONOCYTES # BLD: 6 %
PDW BLD-RTO: 13.6 % (ref 12.5–15.4)
PLATELET # BLD: 241 K/UL (ref 140–450)
PLATELET ESTIMATE: NORMAL
PMV BLD AUTO: 10.2 FL (ref 6–12)
POTASSIUM SERPL-SCNC: 3.9 MMOL/L (ref 3.7–5.3)
RBC # BLD: 4.09 M/UL (ref 4–5.2)
RBC # BLD: NORMAL 10*6/UL
SEG NEUTROPHILS: 67 %
SEGMENTED NEUTROPHILS ABSOLUTE COUNT: 5.3 K/UL (ref 1.8–7.7)
SODIUM BLD-SCNC: 139 MMOL/L (ref 135–144)
TOTAL PROTEIN: 6.8 G/DL (ref 6.4–8.3)
WBC # BLD: 7.8 K/UL (ref 3.5–11)
WBC # BLD: NORMAL 10*3/UL

## 2017-07-29 PROCEDURE — 83605 ASSAY OF LACTIC ACID: CPT

## 2017-07-29 PROCEDURE — 83690 ASSAY OF LIPASE: CPT

## 2017-07-29 PROCEDURE — 99284 EMERGENCY DEPT VISIT MOD MDM: CPT

## 2017-07-29 PROCEDURE — 85025 COMPLETE CBC W/AUTO DIFF WBC: CPT

## 2017-07-29 PROCEDURE — 80048 BASIC METABOLIC PNL TOTAL CA: CPT

## 2017-07-29 PROCEDURE — 96374 THER/PROPH/DIAG INJ IV PUSH: CPT

## 2017-07-29 PROCEDURE — 6360000002 HC RX W HCPCS: Performed by: EMERGENCY MEDICINE

## 2017-07-29 PROCEDURE — 80076 HEPATIC FUNCTION PANEL: CPT

## 2017-07-29 PROCEDURE — 84703 CHORIONIC GONADOTROPIN ASSAY: CPT

## 2017-07-29 RX ORDER — MORPHINE SULFATE 4 MG/ML
4 INJECTION, SOLUTION INTRAMUSCULAR; INTRAVENOUS ONCE
Status: COMPLETED | OUTPATIENT
Start: 2017-07-29 | End: 2017-07-29

## 2017-07-29 RX ORDER — ONDANSETRON 2 MG/ML
4 INJECTION INTRAMUSCULAR; INTRAVENOUS ONCE
Status: DISCONTINUED | OUTPATIENT
Start: 2017-07-29 | End: 2017-07-29

## 2017-07-29 RX ADMIN — MORPHINE SULFATE 4 MG: 4 INJECTION, SOLUTION INTRAMUSCULAR; INTRAVENOUS at 22:41

## 2017-07-29 ASSESSMENT — ENCOUNTER SYMPTOMS
COUGH: 0
ABDOMINAL PAIN: 1
STRIDOR: 0
VOMITING: 0
BLOOD IN STOOL: 0
SHORTNESS OF BREATH: 0
ABDOMINAL DISTENTION: 0
NAUSEA: 1
CHEST TIGHTNESS: 0
WHEEZING: 0
DIARRHEA: 0

## 2017-07-29 ASSESSMENT — PAIN DESCRIPTION - ONSET: ONSET: SUDDEN

## 2017-07-29 ASSESSMENT — PAIN DESCRIPTION - FREQUENCY: FREQUENCY: CONTINUOUS

## 2017-07-29 ASSESSMENT — PAIN SCALES - GENERAL: PAINLEVEL_OUTOF10: 8

## 2017-07-29 ASSESSMENT — PAIN DESCRIPTION - DESCRIPTORS: DESCRIPTORS: ACHING;SHARP;SHOOTING

## 2017-07-29 ASSESSMENT — PAIN DESCRIPTION - LOCATION: LOCATION: FLANK;BACK

## 2017-07-29 ASSESSMENT — PAIN DESCRIPTION - ORIENTATION: ORIENTATION: RIGHT;LEFT

## 2017-07-29 ASSESSMENT — PAIN DESCRIPTION - PAIN TYPE: TYPE: ACUTE PAIN

## 2017-07-30 ENCOUNTER — APPOINTMENT (OUTPATIENT)
Dept: CT IMAGING | Age: 37
End: 2017-07-30
Payer: MEDICARE

## 2017-07-30 LAB
-: ABNORMAL
AMORPHOUS: ABNORMAL
BACTERIA: ABNORMAL
BILIRUBIN URINE: NEGATIVE
CASTS UA: ABNORMAL /LPF (ref 0–8)
COLOR: YELLOW
COMMENT UA: ABNORMAL
CRYSTALS, UA: ABNORMAL /HPF
EPITHELIAL CELLS UA: ABNORMAL /HPF (ref 0–5)
GLUCOSE URINE: NEGATIVE
KETONES, URINE: ABNORMAL
LEUKOCYTE ESTERASE, URINE: NEGATIVE
MUCUS: ABNORMAL
NITRITE, URINE: NEGATIVE
OTHER OBSERVATIONS UA: ABNORMAL
PH UA: 6 (ref 5–8)
PROTEIN UA: NEGATIVE
RBC UA: ABNORMAL /HPF (ref 0–4)
RENAL EPITHELIAL, UA: ABNORMAL /HPF
SPECIFIC GRAVITY UA: 1.02 (ref 1–1.03)
TRICHOMONAS: ABNORMAL
TURBIDITY: ABNORMAL
URINE HGB: ABNORMAL
UROBILINOGEN, URINE: NORMAL
WBC UA: ABNORMAL /HPF (ref 0–5)
YEAST: ABNORMAL

## 2017-07-30 PROCEDURE — 6360000004 HC RX CONTRAST MEDICATION: Performed by: EMERGENCY MEDICINE

## 2017-07-30 PROCEDURE — 81001 URINALYSIS AUTO W/SCOPE: CPT

## 2017-07-30 PROCEDURE — 74177 CT ABD & PELVIS W/CONTRAST: CPT

## 2017-07-30 PROCEDURE — 96375 TX/PRO/DX INJ NEW DRUG ADDON: CPT

## 2017-07-30 PROCEDURE — 6360000002 HC RX W HCPCS

## 2017-07-30 RX ORDER — ONDANSETRON 2 MG/ML
4 INJECTION INTRAMUSCULAR; INTRAVENOUS ONCE
Status: COMPLETED | OUTPATIENT
Start: 2017-07-30 | End: 2017-07-30

## 2017-07-30 RX ORDER — ONDANSETRON 2 MG/ML
INJECTION INTRAMUSCULAR; INTRAVENOUS
Status: COMPLETED
Start: 2017-07-30 | End: 2017-07-30

## 2017-07-30 RX ORDER — HYDROCODONE BITARTRATE AND ACETAMINOPHEN 5; 325 MG/1; MG/1
1 TABLET ORAL EVERY 6 HOURS PRN
Qty: 10 TABLET | Refills: 0 | Status: SHIPPED | OUTPATIENT
Start: 2017-07-30 | End: 2017-08-06

## 2017-07-30 RX ADMIN — ONDANSETRON 4 MG: 2 INJECTION INTRAMUSCULAR; INTRAVENOUS at 03:23

## 2017-07-30 RX ADMIN — IOVERSOL 130 ML: 741 INJECTION INTRA-ARTERIAL; INTRAVENOUS at 05:23

## 2017-10-04 ENCOUNTER — TELEPHONE (OUTPATIENT)
Dept: FAMILY MEDICINE CLINIC | Age: 37
End: 2017-10-04

## 2017-10-04 NOTE — TELEPHONE ENCOUNTER
Patient has an ER follow Up on 10/9/17 seen at [facility] 10/3/17 for low BP, tingling and numbness whole body and dizziness.

## 2017-10-19 ENCOUNTER — APPOINTMENT (OUTPATIENT)
Dept: GENERAL RADIOLOGY | Age: 37
End: 2017-10-19
Payer: COMMERCIAL

## 2017-10-19 ENCOUNTER — HOSPITAL ENCOUNTER (EMERGENCY)
Age: 37
Discharge: HOME | End: 2017-10-19
Attending: EMERGENCY MEDICINE
Payer: COMMERCIAL

## 2017-10-19 VITALS
HEART RATE: 87 BPM | SYSTOLIC BLOOD PRESSURE: 136 MMHG | DIASTOLIC BLOOD PRESSURE: 88 MMHG | TEMPERATURE: 97 F | OXYGEN SATURATION: 96 % | RESPIRATION RATE: 14 BRPM

## 2017-10-19 DIAGNOSIS — S92.301A CLOSED AVULSION FRACTURE OF METATARSAL BONE OF RIGHT FOOT, INITIAL ENCOUNTER: Primary | ICD-10-CM

## 2017-10-19 PROCEDURE — 90471 IMMUNIZATION ADMIN: CPT | Performed by: EMERGENCY MEDICINE

## 2017-10-19 PROCEDURE — 73630 X-RAY EXAM OF FOOT: CPT

## 2017-10-19 PROCEDURE — 90715 TDAP VACCINE 7 YRS/> IM: CPT | Performed by: EMERGENCY MEDICINE

## 2017-10-19 PROCEDURE — 96372 THER/PROPH/DIAG INJ SC/IM: CPT

## 2017-10-19 PROCEDURE — 99283 EMERGENCY DEPT VISIT LOW MDM: CPT

## 2017-10-19 PROCEDURE — 6360000002 HC RX W HCPCS: Performed by: EMERGENCY MEDICINE

## 2017-10-19 RX ORDER — OXYCODONE HYDROCHLORIDE AND ACETAMINOPHEN 5; 325 MG/1; MG/1
1 TABLET ORAL EVERY 8 HOURS PRN
Qty: 10 TABLET | Refills: 0 | Status: SHIPPED | OUTPATIENT
Start: 2017-10-19 | End: 2017-10-25 | Stop reason: ALTCHOICE

## 2017-10-19 RX ORDER — ONDANSETRON 2 MG/ML
4 INJECTION INTRAMUSCULAR; INTRAVENOUS ONCE
Status: DISCONTINUED | OUTPATIENT
Start: 2017-10-19 | End: 2017-10-19

## 2017-10-19 RX ORDER — FENTANYL CITRATE 50 UG/ML
50 INJECTION, SOLUTION INTRAMUSCULAR; INTRAVENOUS ONCE
Status: COMPLETED | OUTPATIENT
Start: 2017-10-19 | End: 2017-10-19

## 2017-10-19 RX ORDER — ONDANSETRON 4 MG/1
4 TABLET, FILM COATED ORAL ONCE
Status: COMPLETED | OUTPATIENT
Start: 2017-10-19 | End: 2017-10-19

## 2017-10-19 RX ADMIN — TETANUS TOXOID, REDUCED DIPHTHERIA TOXOID AND ACELLULAR PERTUSSIS VACCINE, ADSORBED 0.5 ML: 5; 2.5; 8; 8; 2.5 SUSPENSION INTRAMUSCULAR at 04:57

## 2017-10-19 RX ADMIN — FENTANYL CITRATE 50 MCG: 50 INJECTION, SOLUTION INTRAMUSCULAR; INTRAVENOUS at 04:59

## 2017-10-19 RX ADMIN — ONDANSETRON 4 MG: 4 TABLET, FILM COATED ORAL at 04:57

## 2017-10-19 ASSESSMENT — PAIN DESCRIPTION - LOCATION: LOCATION: FOOT

## 2017-10-19 ASSESSMENT — PAIN SCALES - GENERAL
PAINLEVEL_OUTOF10: 7
PAINLEVEL_OUTOF10: 7

## 2017-10-19 ASSESSMENT — ENCOUNTER SYMPTOMS
SHORTNESS OF BREATH: 0
TROUBLE SWALLOWING: 0
CONSTIPATION: 0
BACK PAIN: 0
RHINORRHEA: 0
ABDOMINAL PAIN: 0
NAUSEA: 0
VOMITING: 0
DIARRHEA: 0

## 2017-10-19 ASSESSMENT — PAIN DESCRIPTION - ORIENTATION: ORIENTATION: RIGHT

## 2017-10-19 ASSESSMENT — PAIN DESCRIPTION - PAIN TYPE: TYPE: ACUTE PAIN

## 2017-10-19 NOTE — ED PROVIDER NOTES
[facility] ED  Emergency Department Encounter  Emergency Medicine Resident     Pt Name: Maday Ramírez  MRN: [id number]  [place] 1980  Date of evaluation: 10/19/17  PCP:  Yaima Lui PA-C    CHIEF COMPLAINT       Chief Complaint   Patient presents with    Foot Pain       HISTORY OF PRESENT ILLNESS  (Location/Symptom, Timing/Onset, Context/Setting, Quality, Duration, Modifying Factors, Severity.)      Maday Ramírez is a 40 y.o. female who presents with Right foot pain. The patient reports that around 3 AM tonight she was at work when she accidentally dropped a piece of heavy sheet metal onto her right foot. She states that the metal and caused a small laceration to her right pinky toe. Since the incident she has had constant, nonradiating, right foot pain. She has not taken any medications for symptoms and does not list any palliating factors. Her pain is exacerbated with ambulation and movement of her right foot. The patient also complains of some mild sensory changes to the lateral aspect of her right foot. She complains of associated nausea secondary to pain. The patient denies any previous injury or surgeries to her right foot. She denies fever, chills, headache, vision changes, vomiting, chest pain, shortness of breath, abdominal pain, urinary/bowel symptoms, focal weakness, recent illness. PAST MEDICAL / SURGICAL / SOCIAL / FAMILY HISTORY      has a past medical history of Anemia; Anxiety; Compression fracture; Depression; Enlarged uterus; Hypertension; Hypertension; Menorrhagia; Smoker; Vitamin D deficiency; and Wedge compression fracture of T11 vertebra ([facility] [address] has a past surgical history that includes Tonsillectomy (age 4);  section (); Tubal ligation (); hysteroscopy (15); Endometrial ablation (14); Hemorrhoid surgery (N/A); toenail excision (Bilateral, 2016); and Gastric bypass surgery.     Social History     Social History    Katie Tucker PA-C       REVIEW OF SYSTEMS    (2-9 systems for level 4, 10 or more for level 5)      Review of Systems   Constitutional: Negative for chills, diaphoresis and fever. HENT: Negative for congestion, rhinorrhea and trouble swallowing. Eyes: Negative for visual disturbance. Respiratory: Negative for shortness of breath. Cardiovascular: Negative for chest pain. Gastrointestinal: Negative for abdominal pain, constipation, diarrhea, nausea and vomiting. Genitourinary: Negative for difficulty urinating, dysuria, frequency and hematuria. Musculoskeletal: Negative for back pain, neck pain and neck stiffness. Right foot pain   Skin: Negative for wound. Neurological: Positive for numbness (mild numbness to lateral aspect right foot). Negative for dizziness, weakness, light-headedness and headaches. PHYSICAL EXAM   (up to 7 for level 4, 8 or more for level 5)      INITIAL VITALS:   /88   Pulse 87   Temp 97 °F (36.1 °C) (Oral)   Resp 14   LMP 10/05/2017 (Approximate)   SpO2 96%     Physical Exam   Constitutional: She is oriented to person, place, and time. She appears well-developed and well-nourished. No distress. HENT:   Head: Normocephalic and atraumatic. Right Ear: External ear normal.   Left Ear: External ear normal.   Nose: Nose normal.   Mouth/Throat: Oropharynx is clear and moist.   Eyes: Conjunctivae are normal. Right eye exhibits no discharge. Left eye exhibits no discharge. No scleral icterus. Neck: Normal range of motion. Neck supple. Cardiovascular: Normal rate, regular rhythm, normal heart sounds and intact distal pulses. Cap refill <2 seconds. DP/PT pulses 2+/4. Pulmonary/Chest: Effort normal and breath sounds normal. No stridor. No respiratory distress. She has no wheezes. She has no rales. She exhibits no tenderness. Abdominal: Soft. She exhibits no distension and no mass. There is no tenderness. There is no guarding.    Musculoskeletal: Normal range of motion. She exhibits tenderness (To palpation over the lateral aspect of the right foot and over the toes). She exhibits no edema or deformity. No midline spinal tenderness to palpation, step off or deformity. No CVA tenderness. Compartments soft. Neurological: She is alert and oriented to person, place, and time. No focal sensory or motor deficit. Moves all extremities. Normal gait. Normal speech. Able to wiggle toes. Normal strength with dorsi and plantarflexion the bilateral feet. Skin: Skin is warm and dry. She is not diaphoretic. There is a 0.5 cm laceration to the lateral aspect of the right pinky toe       DIFFERENTIAL  DIAGNOSIS     PLAN (LABS / IMAGING / EKG):  Orders Placed This Encounter   Procedures    XR FOOT RIGHT (MIN 3 VIEWS)    Crutches       MEDICATIONS ORDERED:  Orders Placed This Encounter   Medications    DISCONTD: ondansetron (ZOFRAN) injection 4 mg    fentaNYL (SUBLIMAZE) injection 50 mcg    Tetanus-Diphth-Acell Pertussis (BOOSTRIX) injection 0.5 mL    ondansetron (ZOFRAN) tablet 4 mg    oxyCODONE-acetaminophen (PERCOCET) 5-325 MG per tablet     Sig: Take 1 tablet by mouth every 8 hours as needed for Pain . Dispense:  10 tablet     Refill:  0       DDX: Sprain, Pseudo-Liang fracture, Liang fracture, Tran AC, Jessica Cisneros, additional fractures      DIAGNOSTIC RESULTS / [address] [place] [address] / [facility]     LABS:  No results found for this visit on 10/19/17. IMPRESSION: The patient is a 44-year-old female who presents for evaluation of right foot pain. Vital signs are stable. The patient has been palpation over the lateral aspect of her right foot. X-ray of the right foot shows suspected fracture fragments between the second and third digits but no donor site is identified. I suspect the patient's symptoms are secondary to fracture. I will suspicion for infection.   I placed the patient in a posterior splint and instructed her to follow-up with podiatry. Instructed to return to the ED for worsening symptoms or any other concern. I initially controlled her pain with fentanyl and controlled and nausea with Zofran. The patient is unable to take NSAIDs so I prescribed her a short course of Percocet. I updated her tetanus shot. Patient requesting discharge. Patient was given written and verbal instructions prior to discharge. Patient understood and agreed. Patient had no further questions. Pre-hypertension/Hypertension: The patient has been informed that they may have pre-hypertension or Hypertension based on a blood pressure reading in the emergency department. I recommend that the patient call the primary care provider listed on their discharge instructions or a physician of their choice this week to arrange follow up for further evaluation of possible pre-hypertension or Hypertension. RADIOLOGY:  Xr Foot Right (min 3 Views)    Result Date: 10/19/2017  EXAMINATION: 3 VIEWS OF THE RIGHT FOOT 10/19/2017 5:05 am COMPARISON: None. HISTORY: ORDERING SYSTEM PROVIDED HISTORY: dropped heavy piece of sheet metal onto distal portion of right foot, pain over right pinky toe TECHNOLOGIST PROVIDED HISTORY: Reason for exam:->dropped heavy piece of sheet metal onto distal portion of right foot, pain over right pinky toe FINDINGS: There is a bone density fragment between the 3rd and 4th proximal phalanges, along the plantar surface of the foot. Additional hyperdensity projecting over the 3rd proximal phalanx may be intraosseous in location but nonspecific. Osseous alignment is anatomic. No definite osseous fracture. No donor site noted. Soft tissues are unremarkable. Calcaneal spur present     Suspect fracture fragments between the 2nd and 3rd digits but no donor site is identified. Alternative exclusive soft tissue mineralization consider.  However consider correlation with CT foot to exclude occult fracture, if there are localizing

## 2017-10-20 ENCOUNTER — TELEPHONE (OUTPATIENT)
Dept: FAMILY MEDICINE CLINIC | Age: 37
End: 2017-10-20

## 2017-10-20 RX ORDER — ONDANSETRON 4 MG/1
4 TABLET, FILM COATED ORAL DAILY PRN
Qty: 30 TABLET | Refills: 0 | Status: SHIPPED | OUTPATIENT
Start: 2017-10-20 | End: 2017-11-07 | Stop reason: ALTCHOICE

## 2017-10-20 NOTE — TELEPHONE ENCOUNTER
Pt states she would like to have Zofran called in for her as she is taking Percocet which causes her to be nauseous. LOV- 5/31/17    Pt was seen in the ED at [facility], for a fracture of metatarsal bone of right foot on 10/19/17. Please advise. Thank you.

## 2017-10-22 ENCOUNTER — HOSPITAL ENCOUNTER (EMERGENCY)
Age: 37
Discharge: HOME | End: 2017-10-22
Attending: EMERGENCY MEDICINE
Payer: MEDICARE

## 2017-10-22 VITALS
OXYGEN SATURATION: 98 % | RESPIRATION RATE: 18 BRPM | SYSTOLIC BLOOD PRESSURE: 122 MMHG | HEART RATE: 77 BPM | DIASTOLIC BLOOD PRESSURE: 72 MMHG | TEMPERATURE: 97.9 F

## 2017-10-22 DIAGNOSIS — M79.671 RIGHT FOOT PAIN: Primary | ICD-10-CM

## 2017-10-22 PROCEDURE — 99283 EMERGENCY DEPT VISIT LOW MDM: CPT

## 2017-10-22 PROCEDURE — 6370000000 HC RX 637 (ALT 250 FOR IP): Performed by: EMERGENCY MEDICINE

## 2017-10-22 RX ORDER — OXYCODONE HYDROCHLORIDE AND ACETAMINOPHEN 5; 325 MG/1; MG/1
1-2 TABLET ORAL EVERY 6 HOURS PRN
Qty: 10 TABLET | Refills: 0 | Status: SHIPPED | OUTPATIENT
Start: 2017-10-22 | End: 2017-10-25 | Stop reason: ALTCHOICE

## 2017-10-22 RX ORDER — OXYCODONE HYDROCHLORIDE AND ACETAMINOPHEN 5; 325 MG/1; MG/1
1 TABLET ORAL ONCE
Status: COMPLETED | OUTPATIENT
Start: 2017-10-22 | End: 2017-10-22

## 2017-10-22 RX ADMIN — OXYCODONE HYDROCHLORIDE AND ACETAMINOPHEN 1 TABLET: 5; 325 TABLET ORAL at 21:28

## 2017-10-22 ASSESSMENT — PAIN SCALES - GENERAL
PAINLEVEL_OUTOF10: 8
PAINLEVEL_OUTOF10: 8

## 2017-10-22 ASSESSMENT — PAIN DESCRIPTION - DESCRIPTORS: DESCRIPTORS: TINGLING;DISCOMFORT

## 2017-10-22 ASSESSMENT — ENCOUNTER SYMPTOMS
ABDOMINAL PAIN: 0
SHORTNESS OF BREATH: 0

## 2017-10-22 ASSESSMENT — PAIN DESCRIPTION - ORIENTATION: ORIENTATION: RIGHT

## 2017-10-22 ASSESSMENT — PAIN DESCRIPTION - LOCATION: LOCATION: FOOT

## 2017-10-25 ENCOUNTER — OFFICE VISIT (OUTPATIENT)
Dept: PODIATRY | Age: 37
End: 2017-10-25
Payer: COMMERCIAL

## 2017-10-25 VITALS
BODY MASS INDEX: 24.34 KG/M2 | WEIGHT: 170 LBS | HEART RATE: 87 BPM | HEIGHT: 70 IN | SYSTOLIC BLOOD PRESSURE: 126 MMHG | DIASTOLIC BLOOD PRESSURE: 86 MMHG

## 2017-10-25 DIAGNOSIS — S90.31XA CONTUSION OF RIGHT FOOT, INITIAL ENCOUNTER: ICD-10-CM

## 2017-10-25 DIAGNOSIS — S92.511A CLOSED DISPLACED FRACTURE OF PROXIMAL PHALANX OF LESSER TOE OF RIGHT FOOT, INITIAL ENCOUNTER: Primary | ICD-10-CM

## 2017-10-25 DIAGNOSIS — T14.8XXA PERIPHERAL NERVE CONTUSION: ICD-10-CM

## 2017-10-25 PROCEDURE — L4360 PNEUMAT WALKING BOOT PRE CST: HCPCS | Performed by: PODIATRIST

## 2017-10-25 PROCEDURE — 99213 OFFICE O/P EST LOW 20 MIN: CPT | Performed by: PODIATRIST

## 2017-10-25 RX ORDER — OXYCODONE HYDROCHLORIDE AND ACETAMINOPHEN 5; 325 MG/1; MG/1
1 TABLET ORAL EVERY 6 HOURS PRN
Qty: 20 TABLET | Refills: 0 | Status: SHIPPED | OUTPATIENT
Start: 2017-10-25 | End: 2017-11-01

## 2017-10-25 ASSESSMENT — ENCOUNTER SYMPTOMS
NAUSEA: 0
COLOR CHANGE: 0
VOMITING: 0
DIARRHEA: 0
CONSTIPATION: 0

## 2017-10-25 NOTE — PROGRESS NOTES
Neurological:  Sensation present to light touch to level of digits, Bilateral. Dorsal right foot from midfoot to toes is very sensitive to the touch. She flinches with light touch. Musculoskeletal: Muscle strength hard to assess due to apparent pain Right. Pain present upon palpation of entire dorsal right foot to toes, no deformity, dried blood, but no open lesions right 5th toe. No edema localized to where the chip fracture was seen on radiograph, Right. normal medial longitudinal arch, Right. Integument: Warm, dry, supple, Bilateral.  Open lesion absent, Right. I    Gait analysis:     Asessment: Patient is a 40 y.o. female with:   1. Closed displaced fracture of proximal phalanx of lesser toe of right foot, initial encounter    2. Contusion of right foot, initial encounter    3. Peripheral nerve contusion          Plan: Patient examined and evaluated. Current condition and treatment options discussed in detail. Verbal and written instructions given to patient. Contact office with any questions/problems/concerns. 1) off work at least 2 weeks  2) continue NWB, ice, rest  3) I have dispensed a pneumatic equalizer walker. Due to the patient's diagnosis and related symptoms this is medically necessary for the treatment. The function of this device is to restrict and limit motion and provide stabilization and compression to the affected area. This device will allow the patient to slowly increase strength and ROM of the extremity. Specific instructions on weight bearing and ROM exercises were discussed and given to the patient. The goals and function of this device was explained in detail to the patient. Upon gait analysis, the device appeared to be fitting well and the patient states that the device is comfortable at this time. The patient was shown how to properly apply, wear, and care for the device. The patient was able to apply properly and ambulate without distress.  At that time, the device was  dispensed, it was suitable for the patient's condition and was not substandard. No guarantees were given and the precautions were reviewed. Written instructions and warranty information was given along with the list of the twenty-one (21) Durable Medical Equipment Supplier Guidelines. All the questions were answered satisfactorily  4) Tubi     Orders Placed This Encounter   Procedures    TN PNEUMAT WALKING BOOT PRE CST     Right Foot     Orders Placed This Encounter   Medications    oxyCODONE-acetaminophen (PERCOCET) 5-325 MG per tablet     Sig: Take 1 tablet by mouth every 6 hours as needed for Pain . Dispense:  20 tablet     Refill:  0        RTC in 2week(s).     10/25/2017

## 2017-10-28 ENCOUNTER — APPOINTMENT (OUTPATIENT)
Dept: GENERAL RADIOLOGY | Age: 37
End: 2017-10-28
Payer: COMMERCIAL

## 2017-10-28 ENCOUNTER — HOSPITAL ENCOUNTER (EMERGENCY)
Age: 37
Discharge: HOME | End: 2017-10-28
Attending: EMERGENCY MEDICINE
Payer: COMMERCIAL

## 2017-10-28 VITALS
RESPIRATION RATE: 19 BRPM | SYSTOLIC BLOOD PRESSURE: 130 MMHG | WEIGHT: 170 LBS | OXYGEN SATURATION: 98 % | HEIGHT: 70 IN | DIASTOLIC BLOOD PRESSURE: 85 MMHG | HEART RATE: 110 BPM | TEMPERATURE: 98.8 F | BODY MASS INDEX: 24.34 KG/M2

## 2017-10-28 DIAGNOSIS — S93.601A SPRAIN OF RIGHT FOOT, INITIAL ENCOUNTER: Primary | ICD-10-CM

## 2017-10-28 PROCEDURE — 99283 EMERGENCY DEPT VISIT LOW MDM: CPT

## 2017-10-28 PROCEDURE — 73610 X-RAY EXAM OF ANKLE: CPT

## 2017-10-28 PROCEDURE — 73630 X-RAY EXAM OF FOOT: CPT

## 2017-10-28 PROCEDURE — 6360000002 HC RX W HCPCS: Performed by: STUDENT IN AN ORGANIZED HEALTH CARE EDUCATION/TRAINING PROGRAM

## 2017-10-28 PROCEDURE — 6370000000 HC RX 637 (ALT 250 FOR IP): Performed by: STUDENT IN AN ORGANIZED HEALTH CARE EDUCATION/TRAINING PROGRAM

## 2017-10-28 RX ORDER — ONDANSETRON 4 MG/1
4 TABLET, FILM COATED ORAL ONCE
Status: COMPLETED | OUTPATIENT
Start: 2017-10-28 | End: 2017-10-28

## 2017-10-28 RX ORDER — OXYCODONE HYDROCHLORIDE AND ACETAMINOPHEN 5; 325 MG/1; MG/1
1 TABLET ORAL ONCE
Status: COMPLETED | OUTPATIENT
Start: 2017-10-28 | End: 2017-10-28

## 2017-10-28 RX ADMIN — OXYCODONE HYDROCHLORIDE AND ACETAMINOPHEN 1 TABLET: 5; 325 TABLET ORAL at 21:52

## 2017-10-28 RX ADMIN — ONDANSETRON 4 MG: 4 TABLET, FILM COATED ORAL at 21:51

## 2017-10-28 ASSESSMENT — ENCOUNTER SYMPTOMS: SHORTNESS OF BREATH: 0

## 2017-10-28 ASSESSMENT — PAIN DESCRIPTION - ORIENTATION: ORIENTATION: RIGHT

## 2017-10-28 ASSESSMENT — PAIN DESCRIPTION - DESCRIPTORS: DESCRIPTORS: ACHING;SORE;SPASM

## 2017-10-28 ASSESSMENT — PAIN DESCRIPTION - FREQUENCY: FREQUENCY: CONTINUOUS

## 2017-10-28 ASSESSMENT — PAIN DESCRIPTION - ONSET: ONSET: SUDDEN

## 2017-10-28 ASSESSMENT — PAIN DESCRIPTION - PAIN TYPE: TYPE: ACUTE PAIN

## 2017-10-28 ASSESSMENT — PAIN DESCRIPTION - LOCATION: LOCATION: FOOT

## 2017-10-28 ASSESSMENT — PAIN SCALES - GENERAL
PAINLEVEL_OUTOF10: 8
PAINLEVEL_OUTOF10: 8

## 2017-10-29 NOTE — ED PROVIDER NOTES
FACULTY SIGN-OUT  ADDENDUM     Care of [address] was assumed from Pippa Bryant MD and is being seen for Foot Pain (right foot pain after fall down 4 stairs, currently healing from previous fx to right foot)  . The patient's initial evaluation and plan have been discussed with the prior provider who initially evaluated the patient. ED COURSE      The patient was given the following medications while in the emergency department:      RECENT VITALS:   Temp: 98.8 °F (37.1 °C),  Pulse: 110, Resp: 19, BP: 130/85    MEDICAL DECISION MAKING       This patient is a 40 y.o. Female. With known phalanx fracture, followed by podiatry, new trauma, and worsening pain. Pending x-rays. Patient has received narcotics from podiatry on the 25th of this month, #20 were given.       Mildred Aguilar  Emergency Medicine Attending            Ninfa Keeys DO  10/28/17 [id number]

## 2017-10-29 NOTE — ED PROVIDER NOTES
surgical history that includes Tonsillectomy (age 4);  section (2007); Tubal ligation (); hysteroscopy (15); Endometrial ablation (14); Hemorrhoid surgery (N/A); toenail excision (Bilateral, 2016); and Gastric bypass surgery. Social History     Social History    Marital status:      Spouse name: N/A    Number of children: N/A    Years of education: N/A     Occupational History    Not on file. Social History Main Topics    Smoking status: Former Smoker     Packs/day: 0.50     Years: 15.00     Types: Cigarettes    Smokeless tobacco: Never Used      Comment: stopped 2016    Alcohol use Yes      Comment: socially    Drug use: Unknown    Sexual activity: Yes     Partners: Male     Birth control/ protection: Surgical      Comment: Tubal Ligation     Other Topics Concern    Not on file     Social History Narrative    No narrative on file       Family History   Problem Relation Age of Onset    Diabetes Mother     High Blood Pressure Mother     Heart Disease Father      stents placed    High Blood Pressure Father     Hypotension Sister     Breast Cancer Maternal Grandmother     Cervical Cancer Maternal Grandmother     Other Maternal Grandfather      Drowning accident    Breast Cancer Paternal Grandmother     Heart Disease Paternal Grandfather     Cancer Neg Hx     Colon Cancer Neg Hx     Eclampsia Neg Hx     Hypertension Neg Hx     Ovarian Cancer Neg Hx      Labor Neg Hx     Spont Abortions Neg Hx     Stroke Neg Hx        Allergies:  Keflex [cephalexin]; Nsaids; Other; and Pcn [penicillins]    Home Medications:  Prior to Admission medications    Medication Sig Start Date End Date Taking? Authorizing Provider   Cyanocobalamin (VITAMIN B 12 PO) Take by mouth    Historical Provider, MD   oxyCODONE-acetaminophen (PERCOCET) 5-325 MG per tablet Take 1 tablet by mouth every 6 hours as needed for Pain .  10/25/17 11/1/17  Wesley Acosta DPM midfoot. Sensation is intact to light touch to the rest of the right foot. Skin: Skin is warm and dry. Visualized laceration to the lateral aspect of the fifth digit. There is no erythema, edema, ecchymosis, purulence, or other signs of infection. DIFFERENTIAL  DIAGNOSIS     PLAN (LABS / IMAGING / EKG):  Orders Placed This Encounter   Procedures    XR FOOT RIGHT (MIN 3 VIEWS)    XR ANKLE RIGHT (MIN 3 VIEWS)       MEDICATIONS ORDERED:  Orders Placed This Encounter   Medications    oxyCODONE-acetaminophen (PERCOCET) 5-325 MG per tablet 1 tablet    ondansetron (ZOFRAN) tablet 4 mg       DDX: Lisfranc fracture, sprain, first MPJ dislocation, metatarsal fracture, ankle sprain, medial malleolus fracture    DIAGNOSTIC RESULTS / EMERGENCY DEPARTMENT COURSE / [facility]     LABS:  No results found for this visit on 10/28/17. IMPRESSION: Fidel Walker is a 40 y.o. female with Right foot sprain. X-rays of the right foot and ankle were obtained, no acute fractures. Patient received pain medication 3 days ago. We will discharge the patient. RADIOLOGY:  Xr Ankle Right (min 3 Views)  Result Date: 10/28/2017  EXAMINATION: 3 VIEWS OF THE RIGHT ANKLE 10/28/2017 10:24 pm COMPARISON: None. HISTORY: ORDERING SYSTEM PROVIDED HISTORY: patient fell down stairs, foot rolled under her, medial ankle pain on exam TECHNOLOGIST PROVIDED HISTORY: Reason for exam:->patient fell down stairs, foot rolled under her, medial ankle pain on exam FINDINGS: There is no fracture, effusion or dislocation. The ankle mortise appears intact. The calcaneus and base of the 5th metatarsal are normal.  The soft tissues are normal.  There is no evidence for foreign body seen.      Normal x-ray of the ankle     Xr Foot Right (min 3 Views)  Result Date: 10/28/2017  EXAMINATION: 3 VIEWS OF THE RIGHT FOOT 10/28/2017 10:12 pm COMPARISON: October 19, 2017 HISTORY: ORDERING SYSTEM PROVIDED HISTORY: fell down stairs caught herself, foot rolled under and discharge patient home, patient is to continue nonweightbearing in cam boot with crutches. Patient is to rest ice and elevate right lower extremity. PROCEDURES:  None    CONSULTS:  None    CRITICAL CARE:  None    FINAL IMPRESSION      1.  Sprain of right foot, initial encounter          DISPOSITION / [address] Discharged home    PATIENT REFERRED TO:  Dr. Derek Philip  Follow up with your podiatrist.  Schedule an appointment as soon as possible for a visit         DISCHARGE MEDICATIONS:  Current Discharge Medication List          Pam Gallegos [address] [place] [address] [address]  Non-emergency medicine resident       Pam Gallegos [place]  10/28/17 [address] [place], [place] [address]JOSEFINA  10/28/17 2607

## 2017-10-29 NOTE — ED PROVIDER NOTES
[facility]     Emergency Department     Faculty Attestation        I performed a history and physical examination of the patient and discussed management with the resident. I reviewed the residents note and agree with the documented findings and plan of care. Any areas of disagreement are noted on the chart. I was personally present for the key portions of any procedures. I have documented in the chart those procedures where I was not present during the key portions. I have reviewed the emergency nurses triage note. I agree with the chief complaint, past medical history, past surgical history, allergies, medications, social and family history as documented unless otherwise noted below. For mid-level providers such as nurse practitioners as well as physicians assistants:    I have personally seen and evaluated the patient. I find the patient's history and physical exam are consistent with NP/PA documentation. I agree with the care provided, treatment rendered, disposition, & follow-up plan. Additional findings are as noted. Vital Signs: /85   Pulse 110   Temp 98.8 °F (37.1 °C) (Oral)   Resp 19   Ht 5' 10\" (1.778 m)   Wt 170 lb (77.1 kg)   LMP 10/05/2017 (Approximate)   SpO2 98%   BMI 24.39 kg/m²   PCP:  Eli Lerma PA-C    Pertinent Comments:     Patient complains of right foot pain. She has a known fracture there. She is in a fracture boot for this. She follows with podiatry. She fell down the stairs and injured it again. There is no gross deformity. There is +2 pulses throughout. We'll repeat x-ray      Critical Care  None         Note, if the patient's blood pressure was elevated, and they have no history of hypertension, they were informed of the following:   The patient may have Pre-hypertension/Hypertension: The patient has been informed that they may have pre-hypertension or Hypertension based on a blood pressure

## 2017-10-29 NOTE — ED NOTES
Pt to ER in wheelchair w/ sig other c/o right foot pain after a fall down appx 4 stairs at home pta. Pt arrives w/ a walking boot on right leg and states she has a fx to her right foot. Pt states her pain is usually near her toes but after she hit her leg during her fall she now has increased pain to mid dorsal part of right foot. Pt states she did not have her walking boot on her right lower leg when she had the fall. Pt deneis hitting head, LOC, or any other injury from fall. Pt arrives in nAD and rr even and unlabored. Will continue to monitor.      Rupinder Platt RN  10/28/17 [id number]

## 2017-10-31 ENCOUNTER — TELEPHONE (OUTPATIENT)
Dept: PODIATRY | Age: 37
End: 2017-10-31

## 2017-10-31 DIAGNOSIS — S92.511A CLOSED DISPLACED FRACTURE OF PROXIMAL PHALANX OF LESSER TOE OF RIGHT FOOT, INITIAL ENCOUNTER: Primary | ICD-10-CM

## 2017-10-31 DIAGNOSIS — T14.8XXA PERIPHERAL NERVE CONTUSION: ICD-10-CM

## 2017-10-31 DIAGNOSIS — M79.604 RIGHT LEG PAIN: ICD-10-CM

## 2017-10-31 NOTE — TELEPHONE ENCOUNTER
Patient called because she is having extreme pain in her right leg that radiates to her foot. She wanted to know if she could possibly have a nerve or tendon problem. Per Sarah Wing, we will not give her anything else for pain, she needs to ice and elevate and we can order a bone scan to see what's going on. Patient was ok with this and will call to schedule it.

## 2017-11-03 ENCOUNTER — HOSPITAL ENCOUNTER (OUTPATIENT)
Dept: NUCLEAR MEDICINE | Age: 37
Discharge: HOME | End: 2017-11-03
Payer: MEDICARE

## 2017-11-03 ENCOUNTER — TELEPHONE (OUTPATIENT)
Dept: PODIATRY | Age: 37
End: 2017-11-03

## 2017-11-03 ENCOUNTER — HOSPITAL ENCOUNTER (OUTPATIENT)
Dept: NUCLEAR MEDICINE | Age: 37
End: 2017-11-03
Payer: MEDICARE

## 2017-11-03 DIAGNOSIS — S92.511A CLOSED DISPLACED FRACTURE OF PROXIMAL PHALANX OF LESSER TOE OF RIGHT FOOT, INITIAL ENCOUNTER: ICD-10-CM

## 2017-11-03 DIAGNOSIS — T14.8XXA PERIPHERAL NERVE CONTUSION: ICD-10-CM

## 2017-11-03 DIAGNOSIS — M79.604 RIGHT LEG PAIN: ICD-10-CM

## 2017-11-03 DIAGNOSIS — T14.8XXA: ICD-10-CM

## 2017-11-03 DIAGNOSIS — S92.511A CLOSED DISPLACED FRACTURE OF PROXIMAL PHALANX OF LESSER TOE OF RIGHT FOOT, INITIAL ENCOUNTER: Primary | ICD-10-CM

## 2017-11-03 PROCEDURE — 3430000000 HC RX DIAGNOSTIC RADIOPHARMACEUTICAL: Performed by: PODIATRIST

## 2017-11-03 PROCEDURE — A9503 TC99M MEDRONATE: HCPCS | Performed by: PODIATRIST

## 2017-11-03 PROCEDURE — 78315 BONE IMAGING 3 PHASE: CPT

## 2017-11-03 RX ORDER — TC 99M MEDRONATE 20 MG/10ML
25 INJECTION, POWDER, LYOPHILIZED, FOR SOLUTION INTRAVENOUS
Status: COMPLETED | OUTPATIENT
Start: 2017-11-03 | End: 2017-11-03

## 2017-11-03 RX ADMIN — Medication 25 MILLICURIE: at 09:25

## 2017-11-03 NOTE — TELEPHONE ENCOUNTER
[facility] called and wanted clarification that you wanted a whole body scan since they would only normally do a 3 phase for the diagnosis that was put in.

## 2017-11-07 ENCOUNTER — OFFICE VISIT (OUTPATIENT)
Dept: PODIATRY | Age: 37
End: 2017-11-07
Payer: COMMERCIAL

## 2017-11-07 VITALS
WEIGHT: 170 LBS | SYSTOLIC BLOOD PRESSURE: 137 MMHG | BODY MASS INDEX: 24.34 KG/M2 | HEIGHT: 70 IN | DIASTOLIC BLOOD PRESSURE: 77 MMHG | HEART RATE: 93 BPM

## 2017-11-07 DIAGNOSIS — S90.31XD CONTUSION OF RIGHT FOOT, SUBSEQUENT ENCOUNTER: ICD-10-CM

## 2017-11-07 DIAGNOSIS — S92.511D CLOSED DISPLACED FRACTURE OF PROXIMAL PHALANX OF LESSER TOE OF RIGHT FOOT WITH ROUTINE HEALING, SUBSEQUENT ENCOUNTER: Primary | ICD-10-CM

## 2017-11-07 DIAGNOSIS — T14.8XXA: ICD-10-CM

## 2017-11-07 DIAGNOSIS — M79.604 RIGHT LEG PAIN: ICD-10-CM

## 2017-11-07 PROCEDURE — 99213 OFFICE O/P EST LOW 20 MIN: CPT | Performed by: PODIATRIST

## 2017-11-07 RX ORDER — CHOLECALCIFEROL (VITAMIN D3) 1250 MCG
50000 CAPSULE ORAL
COMMUNITY
Start: 2017-11-07

## 2017-11-07 RX ORDER — SYRINGE WITH NEEDLE, 1 ML 25GX5/8"
SYRINGE, EMPTY DISPOSABLE MISCELLANEOUS
Refills: 11 | COMMUNITY
Start: 2017-11-01 | End: 2019-01-01

## 2017-11-07 RX ORDER — TRAMADOL HYDROCHLORIDE 50 MG/1
50 TABLET ORAL EVERY 6 HOURS PRN
Qty: 30 TABLET | Refills: 0 | Status: SHIPPED | OUTPATIENT
Start: 2017-11-07 | End: 2017-11-14

## 2017-11-07 RX ORDER — CYANOCOBALAMIN 1000 UG/ML
INJECTION INTRAMUSCULAR; SUBCUTANEOUS
Refills: 11 | COMMUNITY
Start: 2017-11-01

## 2017-11-07 RX ORDER — PNV,CALCIUM 72/IRON/FOLIC ACID 27 MG-1 MG
TABLET ORAL
Refills: 11 | COMMUNITY
Start: 2017-11-01 | End: 2018-01-05 | Stop reason: SDUPTHER

## 2017-11-07 RX ORDER — FAMOTIDINE 20 MG/1
TABLET, FILM COATED ORAL
Refills: 2 | COMMUNITY
Start: 2017-11-01 | End: 2018-06-16

## 2017-11-07 ASSESSMENT — ENCOUNTER SYMPTOMS
SHORTNESS OF BREATH: 0
COLOR CHANGE: 0
BACK PAIN: 0
DIARRHEA: 0
NAUSEA: 0

## 2017-11-07 NOTE — LETTER
[facility]  [address]  [address] [address]  Phone: [phone]  Fax: [phone]    Triny Sampson        November 7, 2017     Patient: Teodora Huff   YOB: 1980   Date of Visit: 11/7/2017       To Whom It May Concern: It is my medical opinion that Karlene Mendenhall can return to work with no restrictions. If you have any questions or concerns, please don't hesitate to call.     Sincerely,        Arias Rice DPM

## 2017-11-07 NOTE — PROGRESS NOTES
Subjective: Germán Coffman 40 y.o. female that presents for follow up evaluation of fracture to the right third toe. Chief Complaint   Patient presents with    Foot Injury     Right, still painful on top of foot, pain goes up to hip     Patient's treatment thus far has included CAM walker, NWB, RICE, bone scan. Pain is rated 8 out of 10 and is described as intermittent. Patient states that the top of her right foot is painful. Patient has been following the prescribed course of therapy as instructed. Patient states that she is getting pain all along the side of her right leg from the hip to the foot. Patient states that this pain occurred on October 27, 2017. Patient states that she fell on this date while going down the stairs at home. Patient went to the ED and was evaluated, but states that the hip was not checked and this is what is hurting now. Patient is requesting a prescription for tramadol. Review of Systems   Constitutional: Negative for activity change, appetite change, chills, diaphoresis, fatigue and fever. Respiratory: Negative for shortness of breath. Cardiovascular: Negative for leg swelling. Gastrointestinal: Negative for diarrhea and nausea. Endocrine: Negative for cold intolerance, heat intolerance and polyuria. Musculoskeletal: Positive for joint swelling. Negative for arthralgias, back pain, gait problem and myalgias. Skin: Negative for color change, pallor, rash and wound. Allergic/Immunologic: Negative for environmental allergies and food allergies. Neurological: Negative for dizziness, weakness, light-headedness and numbness. Hematological: Does not bruise/bleed easily. Psychiatric/Behavioral: Negative for behavioral problems, confusion and self-injury. The patient is not nervous/anxious. Objective: Clinical evaluation of the patient reveals pain with palpation to the right third and fourth toes, especially at the base of the third interdigital space. There is edema and mild ecchymosis noted to the right forefoot. There is no erythema, calor, or open lesion noted to the right foot. There is adequate alignment noted to the lesser toes of the right foot. Assessment:   1. Closed displaced fracture of proximal phalanx of lesser toe of right foot with routine healing, subsequent encounter  traMADol (ULTRAM) 50 MG tablet   2. Contusion of right foot, subsequent encounter  traMADol (ULTRAM) 50 MG tablet   3. Contusion of peripheral nerve  traMADol (ULTRAM) 50 MG tablet   4. Right leg pain  traMADol (ULTRAM) 50 MG tablet         Plan: 1. Clinical evaluation of the patient. 2.  Patient advised that she may transition out of her Cam Walker and into regular shoewear and activity. Patient informed that she has more of a soft tissue injury then a concern of fracture. There is a very small avulsion, chip fracture to the right foot, but again her pain is mostly from soft tissue injury. Patient informed that wearing a supportive sneaker with normal weightbearing and help to reduce her swelling and improve her pain. The patient was instructed on proper rest, ice, compression, and elevation to the right lower extremity. Patient given a prescription for tramadol. 3. Return in about 4 weeks (around 12/5/2017) for evaluation of soft tissue injury right forefoot.    11/7/2017      Kali Steele DPM

## 2017-11-13 ENCOUNTER — HOSPITAL ENCOUNTER (EMERGENCY)
Age: 37
Discharge: HOME | End: 2017-11-13
Attending: EMERGENCY MEDICINE
Payer: MEDICARE

## 2017-11-13 VITALS
HEART RATE: 86 BPM | BODY MASS INDEX: 25.11 KG/M2 | RESPIRATION RATE: 16 BRPM | TEMPERATURE: 97.9 F | OXYGEN SATURATION: 100 % | DIASTOLIC BLOOD PRESSURE: 76 MMHG | SYSTOLIC BLOOD PRESSURE: 126 MMHG | WEIGHT: 175 LBS

## 2017-11-13 DIAGNOSIS — R10.9 FLANK PAIN: Primary | ICD-10-CM

## 2017-11-13 LAB
ABSOLUTE EOS #: 0.1 K/UL (ref 0–0.44)
ABSOLUTE IMMATURE GRANULOCYTE: <0.03 K/UL (ref 0–0.3)
ABSOLUTE LYMPH #: 1.77 K/UL (ref 1.1–3.7)
ABSOLUTE MONO #: 0.51 K/UL (ref 0.1–1.2)
ANION GAP SERPL CALCULATED.3IONS-SCNC: 11 MMOL/L (ref 9–17)
BASOPHILS # BLD: 0 % (ref 0–2)
BASOPHILS ABSOLUTE: 0.03 K/UL (ref 0–0.2)
BILIRUBIN URINE: NEGATIVE
BUN BLDV-MCNC: 10 MG/DL (ref 6–20)
BUN/CREAT BLD: NORMAL (ref 9–20)
CALCIUM SERPL-MCNC: 9.4 MG/DL (ref 8.6–10.4)
CHLORIDE BLD-SCNC: 101 MMOL/L (ref 98–107)
CO2: 27 MMOL/L (ref 20–31)
COLOR: ABNORMAL
COMMENT UA: ABNORMAL
CREAT SERPL-MCNC: 0.54 MG/DL (ref 0.5–0.9)
DIFFERENTIAL TYPE: ABNORMAL
EOSINOPHILS RELATIVE PERCENT: 1 % (ref 1–4)
GFR AFRICAN AMERICAN: >60 ML/MIN
GFR NON-AFRICAN AMERICAN: >60 ML/MIN
GFR SERPL CREATININE-BSD FRML MDRD: NORMAL ML/MIN/{1.73_M2}
GFR SERPL CREATININE-BSD FRML MDRD: NORMAL ML/MIN/{1.73_M2}
GLUCOSE BLD-MCNC: 99 MG/DL (ref 70–99)
GLUCOSE URINE: NEGATIVE
HCT VFR BLD CALC: 41.5 % (ref 36.3–47.1)
HEMOGLOBIN: 13.7 G/DL (ref 11.9–15.1)
IMMATURE GRANULOCYTES: 0 %
KETONES, URINE: ABNORMAL
LEUKOCYTE ESTERASE, URINE: NEGATIVE
LYMPHOCYTES # BLD: 23 % (ref 24–43)
MCH RBC QN AUTO: 32.1 PG (ref 25.2–33.5)
MCHC RBC AUTO-ENTMCNC: 33 G/DL (ref 28.4–34.8)
MCV RBC AUTO: 97.2 FL (ref 82.6–102.9)
MONOCYTES # BLD: 7 % (ref 3–12)
NITRITE, URINE: NEGATIVE
PDW BLD-RTO: 12.5 % (ref 11.8–14.4)
PH UA: 5 (ref 5–8)
PLATELET # BLD: 288 K/UL (ref 138–453)
PLATELET ESTIMATE: ABNORMAL
PMV BLD AUTO: 11 FL (ref 8.1–13.5)
POTASSIUM SERPL-SCNC: 3.9 MMOL/L (ref 3.7–5.3)
PROTEIN UA: NEGATIVE
RBC # BLD: 4.27 M/UL (ref 3.95–5.11)
RBC # BLD: ABNORMAL 10*6/UL
SEG NEUTROPHILS: 69 % (ref 36–65)
SEGMENTED NEUTROPHILS ABSOLUTE COUNT: 5.44 K/UL (ref 1.5–8.1)
SODIUM BLD-SCNC: 139 MMOL/L (ref 135–144)
SPECIFIC GRAVITY UA: 1.03 (ref 1–1.03)
TURBIDITY: CLEAR
URINE HGB: NEGATIVE
UROBILINOGEN, URINE: NORMAL
WBC # BLD: 7.9 K/UL (ref 3.5–11.3)
WBC # BLD: ABNORMAL 10*3/UL

## 2017-11-13 PROCEDURE — 96374 THER/PROPH/DIAG INJ IV PUSH: CPT

## 2017-11-13 PROCEDURE — 80048 BASIC METABOLIC PNL TOTAL CA: CPT

## 2017-11-13 PROCEDURE — 85025 COMPLETE CBC W/AUTO DIFF WBC: CPT

## 2017-11-13 PROCEDURE — 99284 EMERGENCY DEPT VISIT MOD MDM: CPT

## 2017-11-13 PROCEDURE — 2580000003 HC RX 258: Performed by: STUDENT IN AN ORGANIZED HEALTH CARE EDUCATION/TRAINING PROGRAM

## 2017-11-13 PROCEDURE — 6360000002 HC RX W HCPCS: Performed by: STUDENT IN AN ORGANIZED HEALTH CARE EDUCATION/TRAINING PROGRAM

## 2017-11-13 PROCEDURE — 81003 URINALYSIS AUTO W/O SCOPE: CPT

## 2017-11-13 PROCEDURE — 96375 TX/PRO/DX INJ NEW DRUG ADDON: CPT

## 2017-11-13 RX ORDER — 0.9 % SODIUM CHLORIDE 0.9 %
1000 INTRAVENOUS SOLUTION INTRAVENOUS ONCE
Status: COMPLETED | OUTPATIENT
Start: 2017-11-13 | End: 2017-11-13

## 2017-11-13 RX ORDER — ONDANSETRON 2 MG/ML
4 INJECTION INTRAMUSCULAR; INTRAVENOUS ONCE
Status: COMPLETED | OUTPATIENT
Start: 2017-11-13 | End: 2017-11-13

## 2017-11-13 RX ORDER — ONDANSETRON 4 MG/1
4 TABLET, ORALLY DISINTEGRATING ORAL EVERY 8 HOURS PRN
Qty: 10 TABLET | Refills: 0 | Status: SHIPPED | OUTPATIENT
Start: 2017-11-13 | End: 2017-12-11 | Stop reason: SDUPTHER

## 2017-11-13 RX ORDER — FENTANYL CITRATE 50 UG/ML
50 INJECTION, SOLUTION INTRAMUSCULAR; INTRAVENOUS ONCE
Status: COMPLETED | OUTPATIENT
Start: 2017-11-13 | End: 2017-11-13

## 2017-11-13 RX ADMIN — FENTANYL CITRATE 50 MCG: 50 INJECTION INTRAMUSCULAR; INTRAVENOUS at 04:50

## 2017-11-13 RX ADMIN — SODIUM CHLORIDE 1000 ML: 9 INJECTION, SOLUTION INTRAVENOUS at 04:50

## 2017-11-13 RX ADMIN — ONDANSETRON 4 MG: 2 INJECTION, SOLUTION INTRAMUSCULAR; INTRAVENOUS at 04:50

## 2017-11-13 ASSESSMENT — ENCOUNTER SYMPTOMS
WHEEZING: 0
VOMITING: 1
ABDOMINAL DISTENTION: 0
BACK PAIN: 1
COUGH: 0
NAUSEA: 1
DIARRHEA: 0
BLOOD IN STOOL: 0
STRIDOR: 0
CONSTIPATION: 0
ABDOMINAL PAIN: 1
SHORTNESS OF BREATH: 0

## 2017-11-13 ASSESSMENT — PAIN SCALES - GENERAL
PAINLEVEL_OUTOF10: 8
PAINLEVEL_OUTOF10: 8

## 2017-11-13 ASSESSMENT — PAIN DESCRIPTION - LOCATION: LOCATION: FLANK

## 2017-11-13 ASSESSMENT — PAIN DESCRIPTION - FREQUENCY: FREQUENCY: CONTINUOUS

## 2017-11-13 ASSESSMENT — PAIN DESCRIPTION - DESCRIPTORS: DESCRIPTORS: CONSTANT;SHARP

## 2017-11-13 ASSESSMENT — PAIN DESCRIPTION - ORIENTATION: ORIENTATION: RIGHT;LEFT

## 2017-11-13 ASSESSMENT — PAIN DESCRIPTION - PAIN TYPE: TYPE: ACUTE PAIN

## 2017-11-13 NOTE — ED NOTES
Pt asked nurse to contact Security to ask pt's visitor to leave. Visitor got up and walked out of room with pt's car keys and Security notified.      Tresa Segal RN  11/13/17 6739 [facility], [place]  11/13/17 0140

## 2017-11-13 NOTE — ED PROVIDER NOTES
[address]  ED  Emergency Department Encounter  Emergency Medicine Resident     Pt Name: Randall Nash  MRN: [id number]  [place] 1980  Date of evaluation: 17  PCP:  Cher Garcia Dr       Chief Complaint   Patient presents with    Flank Pain     c/o bilateral flank pain that started a few hours ago, also c/o n/v       HISTORY OF PRESENT ILLNESS  (Location/Symptom, Timing/Onset, Context/Setting, Quality, Duration, Modifying Factors, Severity.)      Randall Nash is a 40 y.o. female who presents with Bilateral flank pain that started approximately 12:30 tonight while at work. She states that she has a history of kidney stones and that this is consistent with her previous episodes. She denies any fever, admits to nausea and 3 episodes of vomiting after trying to drink water. She denies any chest pain or shortness of breath. PAST MEDICAL / SURGICAL / SOCIAL / FAMILY HISTORY      has a past medical history of Anemia; Anxiety; Compression fracture; Depression; Enlarged uterus; Hypertension; Hypertension; Menorrhagia; Smoker; Vitamin D deficiency; and Wedge compression fracture of T11 vertebra ([facility] [address] has a past surgical history that includes Tonsillectomy (age 4);  section (); Tubal ligation (); hysteroscopy (15); Endometrial ablation (14); Hemorrhoid surgery (N/A); toenail excision (Bilateral, 2016); and Gastric bypass surgery. Social History     Social History    Marital status:      Spouse name: N/A    Number of children: N/A    Years of education: N/A     Occupational History    Not on file.      Social History Main Topics    Smoking status: Former Smoker     Packs/day: 0.50     Years: 15.00     Types: Cigarettes    Smokeless tobacco: Never Used      Comment: stopped 2016    Alcohol use Yes      Comment: socially    Drug use: Unknown    Sexual activity: Yes     Partners: Male     Birth control/ Katie Tucker PA-C       REVIEW OF SYSTEMS    (2-9 systems for level 4, 10 or more for level 5)      Review of Systems   Constitutional: Positive for appetite change and chills. Negative for activity change, fatigue and fever. Respiratory: Negative for cough, shortness of breath, wheezing and stridor. Cardiovascular: Negative for chest pain and palpitations. Gastrointestinal: Positive for abdominal pain, nausea and vomiting (Nonbloody emesis, ×3). Negative for abdominal distention, blood in stool, constipation and diarrhea. Genitourinary: Negative for dysuria, frequency and hematuria. Patient notes that she has not had an episode of urination since symptoms started   Musculoskeletal: Positive for back pain. Neurological: Negative for speech difficulty and headaches. Psychiatric/Behavioral: Negative for suicidal ideas. PHYSICAL EXAM   (up to 7 for level 4, 8 or more for level 5)      INITIAL VITALS:   /76   Pulse 86   Temp 97.9 °F (36.6 °C)   Resp 16   Wt 175 lb (79.4 kg)   LMP 10/28/2017   SpO2 100%   BMI 25.11 kg/m²     Physical Exam   Constitutional: She is oriented to person, place, and time. She appears well-developed and well-nourished. No distress. HENT:   Head: Normocephalic and atraumatic. Eyes: Conjunctivae are normal. Pupils are equal, round, and reactive to light. Cardiovascular: Normal rate, regular rhythm, normal heart sounds and intact distal pulses. Pulmonary/Chest: Effort normal and breath sounds normal. No respiratory distress. She has no wheezes. Abdominal: Soft. Bowel sounds are normal. There is tenderness (Mild diffuse upper quadrant abdominal pain, minimal bilateral CVA tenderness. Patient was laying flat on the bed during exam in a apparent comfortable position). There is no rebound. Musculoskeletal: Normal range of motion. She exhibits no edema or tenderness. Neurological: She is alert and oriented to person, place, and time.  No cranial nerve deficit. Skin: Skin is warm and dry. She is not diaphoretic. Psychiatric: She has a normal mood and affect. Thought content normal.   Nursing note and vitals reviewed.       DIFFERENTIAL  DIAGNOSIS     PLAN (LABS / IMAGING / EKG):  Orders Placed This Encounter   Procedures    CBC Auto Differential    BASIC METABOLIC PANEL    UA W/REFLEX CULTURE       MEDICATIONS ORDERED:  Orders Placed This Encounter   Medications    0.9 % sodium chloride bolus    ondansetron (ZOFRAN) injection 4 mg    fentaNYL (SUBLIMAZE) injection 50 mcg       DDX: Herniated disc, AAA rupture, pyelonephritis, kidney stone, musculoskeletal pain, retroperitoneal hematoma, pancreatitis, splenic rupture, pneumonia      DIAGNOSTIC RESULTS / EMERGENCY DEPARTMENT COURSE / MDM     LABS:  Results for orders placed or performed during the hospital encounter of 11/13/17   CBC Auto Differential   Result Value Ref Range    WBC 7.9 3.5 - 11.3 k/uL    RBC 4.27 3.95 - 5.11 m/uL    Hemoglobin 13.7 11.9 - 15.1 g/dL    Hematocrit 41.5 36.3 - 47.1 %    MCV 97.2 82.6 - 102.9 fL    MCH 32.1 25.2 - 33.5 pg    MCHC 33.0 28.4 - 34.8 g/dL    RDW 12.5 11.8 - 14.4 %    Platelets 709 788 - 816 k/uL    MPV 11.0 8.1 - 13.5 fL    Differential Type NOT REPORTED     WBC Morphology NOT REPORTED     RBC Morphology NOT REPORTED     Platelet Estimate NOT REPORTED     Seg Neutrophils 69 (H) 36 - 65 %    Lymphocytes 23 (L) 24 - 43 %    Monocytes 7 3 - 12 %    Eosinophils % 1 1 - 4 %    Basophils 0 0 - 2 %    Immature Granulocytes 0 0 %    Segs Absolute 5.44 1.50 - 8.10 k/uL    Absolute Lymph # 1.77 1.10 - 3.70 k/uL    Absolute Mono # 0.51 0.10 - 1.20 k/uL    Absolute Eos # 0.10 0.00 - 0.44 k/uL    Basophils # 0.03 0.00 - 0.20 k/uL    Absolute Immature Granulocyte <0.03 0.00 - 0.30 k/uL   BASIC METABOLIC PANEL   Result Value Ref Range    Glucose 99 70 - 99 mg/dL    BUN 10 6 - 20 mg/dL    CREATININE 0.54 0.50 - 0.90 mg/dL    Bun/Cre Ratio NOT REPORTED 9 - 20    Calcium 9.4 8.6 - 10.4 mg/dL    Sodium 139 135 - 144 mmol/L    Potassium 3.9 3.7 - 5.3 mmol/L    Chloride 101 98 - 107 mmol/L    CO2 27 20 - 31 mmol/L    Anion Gap 11 9 - 17 mmol/L    GFR Non-African American >60 >60 mL/min    GFR African American >60 >60 mL/min    GFR Comment          GFR Staging NOT REPORTED    UA W/REFLEX CULTURE   Result Value Ref Range    Color, UA DARK YELLOW (A) YEL    Turbidity UA CLEAR CLEAR    Glucose, Ur NEGATIVE NEG    Bilirubin Urine NEGATIVE NEG    Ketones, Urine SMALL (A) NEG    Specific Gravity, UA 1.035 (H) 1.005 - 1.030    Urine Hgb NEGATIVE NEG    pH, UA 5.0 5.0 - 8.0    Protein, UA NEGATIVE NEG    Urobilinogen, Urine Normal NORM    Nitrite, Urine NEGATIVE NEG    Leukocyte Esterase, Urine NEGATIVE NEG    Urinalysis Comments       Microscopic exam not performed based on chemical results unless requested in       IMPRESSION: Flank pain, nausea, pain improved with fentanyl, Zofran and IV fluids. Vital signs remained stable throughout her stay. Patient discharged in stable condition and instructed to use her home dose of tramadol as needed for pain as well is given prescription for Zofran. Patient requesting discharge. Patient was given written and verbal instructions prior to discharge. Patient understood and agreed. The patient had no further questions. Pregnancy test was not ordered because The patient is status post ablation and tubal ligation. RADIOLOGY:  RENAL ULTRASOUND:      A limited, bedside ultrasound of the kidneys was performed. The medical necessity was to evaluate for hydronephrosis in a patient with possible renal colic. The structures studied were the kidney, including the renal cortex and collecting system. FINDINGS:    negative for hydronephrosis. The study was technically adequate.     IMAGES:  Electronically uploaded to the PACS system      EKG  None    All EKG's are interpreted by the Emergency Department Physician who either signs or Co-signs this chart in the absence of a cardiologist.    EMERGENCY DEPARTMENT COURSE:  Patient 41-year-old female who presents with bilateral flank pain that started approximately 12:30 this night while at work. She states the symptoms are consistent with her previous episodes of kidney stones. She also admits to 3 episodes of nonbloody emesis. ED Course as of Nov 13 0556   Mon Nov 13, 2017   0532 Blood work unremarkable as above. UA still pending. Patient noted improvement in her symptoms after Zofran and fentanyl. Bedside ultrasound was negative for hydronephrosis. [BL]   [id number] Patient reevaluated was asleep in bed resting comfortably easily arousable. She states that she feels much better after IV fluids medications. And is requesting discharge at this time. [BL]      ED Course User Index  [BL] Atul Mckeon DO      PROCEDURES:  None    CONSULTS:  None      FINAL IMPRESSION      1. Flank pain      Zofran as needed for nausea. Continue home dose of tramadol as needed for pain. Return to ER for fever, chills, nausea, vomiting, diarrhea, difficulty with urination, or any other concerning symptoms. DISPOSITION / PLAN     DISPOSITION Decision To Discharge    PATIENT REFERRED TO:  Sha Rodríguez PA-C  [address]   [facility] [address]  [phone]      As needed    [facility]  [address] [place] [address]  [phone]    If symptoms worsen      DISCHARGE MEDICATIONS:  New Prescriptions    No medications on file       Atul Mckeon, [place]  Emergency Medicine Resident    (Please note that portions of this note were completed with a voice recognition program.  Efforts were made to edit the dictations but occasionally words are mis-transcribed.)      Atul Mckeon, DO  Resident  11/13/17 3027

## 2017-11-13 NOTE — ED NOTES
Nurse asked pt if she feels safe to leave with her visitor outside ED. Pt states she feels safe to leave.       Connor Smith RN  11/13/17 8258

## 2017-11-13 NOTE — ED PROVIDER NOTES
physician of their choice this week to arrange follow up for further evaluation of possible pre-hypertension or Hypertension. (Please note that portions of this note were completed with a voice recognition program.  Efforts were made to edit the dictations but occasionally words are mis-transcribed. )    Miles Diaz MD  Attending Emergency Medicine Physician              Dallin Buckley MD  11/13/17 6881

## 2017-11-15 ENCOUNTER — HOSPITAL ENCOUNTER (EMERGENCY)
Age: 37
Discharge: HOME | End: 2017-11-15
Attending: EMERGENCY MEDICINE
Payer: MEDICARE

## 2017-11-15 VITALS
TEMPERATURE: 98 F | OXYGEN SATURATION: 100 % | HEART RATE: 65 BPM | WEIGHT: 170 LBS | SYSTOLIC BLOOD PRESSURE: 139 MMHG | DIASTOLIC BLOOD PRESSURE: 77 MMHG | RESPIRATION RATE: 16 BRPM | BODY MASS INDEX: 24.34 KG/M2 | HEIGHT: 70 IN

## 2017-11-15 DIAGNOSIS — M79.641 BILATERAL HAND PAIN: Primary | ICD-10-CM

## 2017-11-15 DIAGNOSIS — M79.642 BILATERAL HAND PAIN: Primary | ICD-10-CM

## 2017-11-15 PROCEDURE — 99282 EMERGENCY DEPT VISIT SF MDM: CPT

## 2017-11-15 RX ORDER — TRAMADOL HYDROCHLORIDE 50 MG/1
50 TABLET ORAL EVERY 6 HOURS PRN
Qty: 10 TABLET | Refills: 0 | Status: SHIPPED | OUTPATIENT
Start: 2017-11-15 | End: 2017-11-25

## 2017-11-15 RX ORDER — PREDNISONE 20 MG/1
20 TABLET ORAL 2 TIMES DAILY
Qty: 10 TABLET | Refills: 0 | Status: SHIPPED | OUTPATIENT
Start: 2017-11-15 | End: 2017-11-25

## 2017-11-15 ASSESSMENT — PAIN SCALES - GENERAL: PAINLEVEL_OUTOF10: 7

## 2017-11-16 NOTE — ED PROVIDER NOTES
bilateral ankles and feet were acquired. After 3 hours, delayed bone images were acquired. COMPARISON: Plain radiographs of the right ankle and right foot performed on 10/28/2017, right foot plain radiographs from 10/19/2017 HISTORY: ORDERING SYSTEM PROVIDED HISTORY: Closed displaced fracture of proximal phalanx of lesser toe of right foot, initial encounter TECHNOLOGIST PROVIDED HISTORY: Reason for exam:->Right leg pain that radiates to foot Ordering Physician Provided Reason for Exam: closed displaced fracture of proximal phalanx of lesser toe of right foot,. peripheal nerve contusion, right leg pain Acuity: Unknown Type of Exam: Unknown Mechanism of Injury: dropped sheet meatal onto right foot at work on 10/19/17 Additional signs and symptoms: fractured foot, numbeness on top opf foot and along lateral msid eof foot back to ankle, constant pain that radiates as \" nerve\" poain up her leg 52-year-old female with Close displaced fracture of proximal phalanx of lesser toe ; dropped object on to right foot at work on 10/19/2017 FINDINGS: There is symmetric activity in the flow and blood pool images. Delayed images show no abnormal foci of radiotracer deposition. Low level radiotracer activity in the bilateral midfoot, left greater than right, likely degenerative. Negative three phase bone scan. LABS:  Labs Reviewed - No data to display    All other labs were within normal range or not returned as of this dictation. EMERGENCY DEPARTMENT COURSE and DIFFERENTIAL DIAGNOSIS/MDM:   Vitals:    Vitals:    11/15/17 2205   BP: 139/77   Pulse: 65   Resp: 16   Temp: 98 °F (36.7 °C)   TempSrc: Oral   SpO2: 100%   Weight: 170 lb (77.1 kg)   Height: 5' 10\" (1.778 m)       Rice, will give pain medication, follow-up with family doctor. Patient instructed to return to the emergency room if symptoms worsen, return, or any other concern right away which is agreed.     MEDS  Orders Placed This Encounter   Medications    predniSONE (DELTASONE) 20 MG tablet     Sig: Take 1 tablet by mouth 2 times daily for 10 days     Dispense:  10 tablet     Refill:  0    traMADol (ULTRAM) 50 MG tablet     Sig: Take 1 tablet by mouth every 6 hours as needed for Pain . Dispense:  10 tablet     Refill:  0         CONSULTS:  None    PROCEDURES:  None        FINAL IMPRESSION      1. Bilateral hand pain          DISPOSITION/PLAN   DISPOSITION Decision To Discharge    PATIENT REFERRED TO:  Clau Rose PA-C  [address] [place] [address] [place] [address]  [phone]    Schedule an appointment as soon as possible for a visit       [facility] ED  [place] [id number] [phone]    For worsening symptoms, or any other concern      DISCHARGE MEDICATIONS:  New Prescriptions    PREDNISONE (DELTASONE) 20 MG TABLET    Take 1 tablet by mouth 2 times daily for 10 days    TRAMADOL (ULTRAM) 50 MG TABLET    Take 1 tablet by mouth every 6 hours as needed for Pain .        (Please note that portions of this note were completed with a voice recognition program.  Efforts were made to edit the dictations but occasionally words are mis-transcribed.)    Patti Ruiz, [address], [place]  11/15/17 3641

## 2017-11-16 NOTE — ED PROVIDER NOTES
[address] ED  Emergency Department  Faculty Attestation     Pt Name: Andra Rosales  MRN: [id number]  [place] 1980  Date of evaluation: 11/16/17    I was personally available for consultation by the [facility] in the Emergency Department for chart review, as well as discussion about the assessment, treatment plan and disposition. Andra Rosales is a 40 y.o. female who presents with Hand Pain      Vitals:   Vitals:    11/15/17 2205   BP: 139/77   Pulse: 65   Resp: 16   Temp: 98 °F (36.7 °C)   TempSrc: Oral   SpO2: 100%   Weight: 170 lb (77.1 kg)   Height: 5' 10\" (1.778 m)       Impression:   1.  Bilateral hand pain        Zaria Bunch MD  Attending Emergency  Physician    (Please note that portions of this note were completed with a voice recognition program.  Efforts were made to edit the dictations but occasionally words are mis-transcribed.)        Ange Hurtado MD  11/16/17 9380

## 2017-11-24 ENCOUNTER — OFFICE VISIT (OUTPATIENT)
Dept: FAMILY MEDICINE CLINIC | Age: 37
End: 2017-11-24
Payer: MEDICARE

## 2017-11-24 VITALS
OXYGEN SATURATION: 98 % | DIASTOLIC BLOOD PRESSURE: 68 MMHG | BODY MASS INDEX: 25.77 KG/M2 | HEIGHT: 70 IN | HEART RATE: 72 BPM | TEMPERATURE: 98.3 F | SYSTOLIC BLOOD PRESSURE: 110 MMHG | WEIGHT: 180 LBS

## 2017-11-24 DIAGNOSIS — M79.642 PAIN IN BOTH HANDS: Primary | ICD-10-CM

## 2017-11-24 DIAGNOSIS — M79.641 PAIN IN BOTH HANDS: Primary | ICD-10-CM

## 2017-11-24 DIAGNOSIS — R10.9 FLANK PAIN: ICD-10-CM

## 2017-11-24 PROCEDURE — G8484 FLU IMMUNIZE NO ADMIN: HCPCS | Performed by: PHYSICIAN ASSISTANT

## 2017-11-24 PROCEDURE — G8419 CALC BMI OUT NRM PARAM NOF/U: HCPCS | Performed by: PHYSICIAN ASSISTANT

## 2017-11-24 PROCEDURE — 4004F PT TOBACCO SCREEN RCVD TLK: CPT | Performed by: PHYSICIAN ASSISTANT

## 2017-11-24 PROCEDURE — G8427 DOCREV CUR MEDS BY ELIG CLIN: HCPCS | Performed by: PHYSICIAN ASSISTANT

## 2017-11-24 PROCEDURE — 99213 OFFICE O/P EST LOW 20 MIN: CPT | Performed by: PHYSICIAN ASSISTANT

## 2017-11-24 ASSESSMENT — ENCOUNTER SYMPTOMS
WHEEZING: 0
COUGH: 0
ABDOMINAL PAIN: 0
NAUSEA: 0
COLOR CHANGE: 0
DIARRHEA: 0
SHORTNESS OF BREATH: 0
CONSTIPATION: 0
VOMITING: 0

## 2017-11-24 NOTE — PROGRESS NOTES
Visit Information    Have you changed or started any medications since your last visit including any over-the-counter medicines, vitamins, or herbal medicines? no   Have you stopped taking any of your medications? Is so, why? -  no  Are you having any side effects from any of your medications? - no    Have you seen any other physician or provider since your last visit?  no   Have you had any other diagnostic tests since your last visit?  no   Have you been seen in the emergency room and/or had an admission in a hospital since we last saw you?  no   Have you had your routine dental cleaning in the past 6 months?  no     Do you have an active MyChart account? If no, what is the barrier?   No    Patient Care Team:  Dakotah Harris PA-C as PCP - [address] [place] [address] DO as Consulting Physician (Obstetrics & Gynecology)    Medical History Review  Past Medical, Family, and Social History reviewed and does not contribute to the patient presenting condition    Health Maintenance   Topic Date Due    HIV screen  09/05/1996    Pneumococcal med risk (1 of 1 - PPSV23) 09/05/2000    Flu vaccine (1) 09/01/2017    Cervical cancer screen  09/19/2019    DTaP/Tdap/Td vaccine (2 - Td) 10/19/2027
Right hand: She exhibits tenderness (distal joints). She exhibits normal range of motion, no bony tenderness, normal capillary refill, no deformity, no laceration and no swelling. Normal sensation noted. Left hand: She exhibits tenderness (distal joints). She exhibits normal range of motion, no bony tenderness, normal capillary refill, no deformity, no laceration and no swelling. Normal sensation noted. Neurological: She is alert and oriented to person, place, and time. Skin: Skin is warm and dry. No rash noted. No erythema. No pallor. Psychiatric: She has a normal mood and affect. Her behavior is normal. Judgment and thought content normal.   Nursing note and vitals reviewed. /68 (Site: Left Arm, Position: Sitting, Cuff Size: Medium Adult)   Pulse 72   Temp 98.3 °F (36.8 °C) (Tympanic)   Ht 5' 10\" (1.778 m)   Wt 180 lb (81.6 kg)   LMP 10/28/2017   SpO2 98%   BMI 25.83 kg/m²     Assessment:      1. Pain in both hands  MARILYN Screen with Reflex    Sedimentation rate, automated    Rheumatoid Factor    Uric Acid   2. Flank pain               Plan:      Return if symptoms worsen or fail to improve. Check for autoimmunce cause to joints  Suspect may be start of OA  Patient encouraged to do hand exercises, discussed in detail  Recommended keeping hands warm  Await reuslts    Reviewed US and last CT, no renal cysts noted  Hx stones  Patient symptoms fully resolved, monitor  Patient agreed with [facility]  Health Maintenance reviewed .       Orders Placed This Encounter   Procedures    MARILYN Screen with Reflex     Standing Status:   Future     Standing Expiration Date:   11/24/2018    Sedimentation rate, automated     Standing Status:   Future     Standing Expiration Date:   11/24/2018    Rheumatoid Factor     Standing Status:   Future     Standing Expiration Date:   11/24/2018    Uric Acid     Standing Status:   Future     Standing Expiration Date:   11/24/2018       Patient given

## 2017-11-27 ENCOUNTER — HOSPITAL ENCOUNTER (OUTPATIENT)
Age: 37
Discharge: HOME | End: 2017-11-27
Payer: MEDICARE

## 2017-11-27 DIAGNOSIS — M79.641 PAIN IN BOTH HANDS: ICD-10-CM

## 2017-11-27 DIAGNOSIS — M79.642 PAIN IN BOTH HANDS: ICD-10-CM

## 2017-11-27 LAB
RHEUMATOID FACTOR: <10 IU/ML
SEDIMENTATION RATE, ERYTHROCYTE: 5 MM (ref 0–20)
URIC ACID: 3.2 MG/DL (ref 2.4–5.7)

## 2017-11-27 PROCEDURE — 36415 COLL VENOUS BLD VENIPUNCTURE: CPT

## 2017-11-27 PROCEDURE — 84550 ASSAY OF BLOOD/URIC ACID: CPT

## 2017-11-27 PROCEDURE — 85651 RBC SED RATE NONAUTOMATED: CPT

## 2017-11-27 PROCEDURE — 86038 ANTINUCLEAR ANTIBODIES: CPT

## 2017-11-27 PROCEDURE — 86431 RHEUMATOID FACTOR QUANT: CPT

## 2017-11-28 LAB — ANTI-NUCLEAR ANTIBODY (ANA): NEGATIVE

## 2017-12-07 ENCOUNTER — TELEPHONE (OUTPATIENT)
Dept: FAMILY MEDICINE CLINIC | Age: 37
End: 2017-12-07

## 2017-12-08 ENCOUNTER — OFFICE VISIT (OUTPATIENT)
Dept: FAMILY MEDICINE CLINIC | Age: 37
End: 2017-12-08
Payer: MEDICARE

## 2017-12-08 ENCOUNTER — HOSPITAL ENCOUNTER (OUTPATIENT)
Age: 37
Setting detail: SPECIMEN
Discharge: HOME | End: 2017-12-08
Payer: MEDICARE

## 2017-12-08 VITALS
HEIGHT: 70 IN | BODY MASS INDEX: 24.91 KG/M2 | SYSTOLIC BLOOD PRESSURE: 124 MMHG | TEMPERATURE: 97 F | DIASTOLIC BLOOD PRESSURE: 64 MMHG | HEART RATE: 70 BPM | OXYGEN SATURATION: 99 % | WEIGHT: 174 LBS

## 2017-12-08 DIAGNOSIS — N89.8 VAGINAL DISCHARGE: ICD-10-CM

## 2017-12-08 DIAGNOSIS — Z20.2 POSSIBLE EXPOSURE TO STD: ICD-10-CM

## 2017-12-08 DIAGNOSIS — N89.8 VAGINAL DISCHARGE: Primary | ICD-10-CM

## 2017-12-08 DIAGNOSIS — R30.0 DYSURIA: ICD-10-CM

## 2017-12-08 LAB
BILIRUBIN, POC: NORMAL
BLOOD URINE, POC: NEGATIVE
CLARITY, POC: CLEAR
COLOR, POC: YELLOW
DIRECT EXAM: ABNORMAL
GLUCOSE URINE, POC: NEGATIVE
KETONES, POC: NORMAL
LEUKOCYTE EST, POC: NEGATIVE
Lab: ABNORMAL
NITRITE, POC: NEGATIVE
PH, POC: 5.5
PROTEIN, POC: NORMAL
SPECIFIC GRAVITY, POC: 1.02
SPECIMEN DESCRIPTION: ABNORMAL
STATUS: ABNORMAL
UROBILINOGEN, POC: 1

## 2017-12-08 PROCEDURE — 4004F PT TOBACCO SCREEN RCVD TLK: CPT | Performed by: PHYSICIAN ASSISTANT

## 2017-12-08 PROCEDURE — G8420 CALC BMI NORM PARAMETERS: HCPCS | Performed by: PHYSICIAN ASSISTANT

## 2017-12-08 PROCEDURE — G8484 FLU IMMUNIZE NO ADMIN: HCPCS | Performed by: PHYSICIAN ASSISTANT

## 2017-12-08 PROCEDURE — G8427 DOCREV CUR MEDS BY ELIG CLIN: HCPCS | Performed by: PHYSICIAN ASSISTANT

## 2017-12-08 PROCEDURE — 81003 URINALYSIS AUTO W/O SCOPE: CPT | Performed by: PHYSICIAN ASSISTANT

## 2017-12-08 PROCEDURE — 99213 OFFICE O/P EST LOW 20 MIN: CPT | Performed by: PHYSICIAN ASSISTANT

## 2017-12-08 RX ORDER — DOXYCYCLINE HYCLATE 100 MG
100 TABLET ORAL 2 TIMES DAILY
Qty: 14 TABLET | Refills: 0 | Status: SHIPPED | OUTPATIENT
Start: 2017-12-08 | End: 2017-12-11 | Stop reason: ALTCHOICE

## 2017-12-08 ASSESSMENT — ENCOUNTER SYMPTOMS
NAUSEA: 0
BACK PAIN: 0
COLOR CHANGE: 0
VOMITING: 0

## 2017-12-08 NOTE — PROGRESS NOTES
[address] [facility] [place] [address]  Dept: [phone]  Dept Fax: [phone]    Sobia Cates is a 40 y.o. female who presents today for her medical conditions/complaints as noted below. Sobia Cates is c/o of   Chief Complaint   Patient presents with    Vaginal Discharge     pt is here she wants to do cultures for vaginosis. HPI:     Vaginal Discharge   The patient's primary symptoms include a genital odor and vaginal discharge. The patient's pertinent negatives include no genital itching, genital lesions, genital rash, pelvic pain or vaginal bleeding. This is a new problem. The current episode started in the past 7 days. The problem occurs constantly. The problem has been unchanged. The patient is experiencing no pain. She is not pregnant. Associated symptoms include dysuria and frequency. Pertinent negatives include no back pain, chills, discolored urine, fever, hematuria, nausea, painful intercourse, rash, urgency or vomiting. The vaginal discharge was clear. There has been no bleeding. Nothing aggravates the symptoms. She has tried nothing for the symptoms. It is possible that her partner has an STD. She uses condoms for contraception.        Patient concerned boyfriend is cheating    Hemoglobin A1C (%)   Date Value   04/11/2014 4.9             ( goal A1C is < 7)   No results found for: LABMICR  No results found for: LDLCHOLESTEROL, LDLCALC    (goal LDL is <100)   AST (U/L)   Date Value   07/29/2017 16     ALT (U/L)   Date Value   07/29/2017 15     BUN (mg/dL)   Date Value   11/13/2017 10     BP Readings from Last 3 Encounters:   12/08/17 124/64   11/24/17 110/68   11/15/17 139/77          (goal 120/80)    Past Medical History:   Diagnosis Date    An[facility]     [facility] at [facility] 11/24/2014    Compression fracture     Depression 4/8/2014    Enlarged uterus     Hypertension     Hypertension     Menorrhagia     Smoker     Vitamin D deficiency 10/28/2015    Wedge compression fracture of T11 vertebra [facility])       Past Surgical History:   Procedure Laterality Date     SECTION      [address] ENDOMETRIAL ABLATION  14    [facility]    GASTRIC BYPASS SURGERY      16    HEMORRHOID SURGERY N/A     HYSTEROSCOPY  14    endometrial ablaation    TOENAIL EXCISION Bilateral 2016    TONSILLECTOMY  age 3    TUBAL LIGATION  2007    [address]       Family History   Problem Relation Age of Onset    Diabetes Mother     High Blood Pressure Mother     Heart Disease Father      stents placed    High Blood Pressure Father     Hypotension Sister     Breast Cancer Maternal Grandmother     Cervical Cancer Maternal Grandmother     Other Maternal Grandfather      Drowning accident    Breast Cancer Paternal Grandmother     Heart Disease Paternal Grandfather     Cancer Neg Hx     Colon Cancer Neg Hx     Eclampsia Neg Hx     Hypertension Neg Hx     Ovarian Cancer Neg Hx      Labor Neg Hx     Spont Abortions Neg Hx     Stroke Neg Hx        Social History   Substance Use Topics    Smoking status: Current Every Day Smoker     Packs/day: 1.00     Years: 15.00     Types: Cigarettes    Smokeless tobacco: Never Used      Comment: stopped 2016    Alcohol use Yes      Comment: socially      Current Outpatient Prescriptions   Medication Sig Dispense Refill    doxycycline hyclate (VIBRA-TABS) 100 MG tablet Take 1 tablet by mouth 2 times daily for 7 days With food 14 tablet 0    escitalopram (LEXAPRO) 10 MG tablet TAKE 1 TABLET BY MOUTH DAILY 30 tablet 5    ondansetron (ZOFRAN ODT) 4 MG disintegrating tablet Take 1 tablet by mouth every 8 hours as needed for Nausea 10 tablet 0    famotidine (PEPCID) 20 MG tablet   2    cyanocobalamin 1000 MCG/ML injection INJECT 1 ML INTO THE APPROPRIATE MUSCLE Q 14 DAYS  11    Cholecalciferol (VITAMIN D3) 70063 units CAPS Take 50,000 Units by mouth      B-D 3CC LUER-VIKTORIYA SYR 25GX1/2\" 25G X 1-1/2\" 3 ML MISC U UTD Q 14 DAYS FOR 1 DOSE  11    Prenatal Vit-Fe Fumarate-FA (PREPLUS) 27-1 MG TABS TK 1 T PO QD  11    Cyanocobalamin (VITAMIN B 12 PO) Take by mouth      BIOTIN PO Take 10,000 mcg by mouth daily      Multiple Vitamin (MVI, BARIATRIC ADVANTAGE MULTI-FORMULA, CHEW TAB) Take 1 tablet by mouth 2 times daily 60 tablet 6     No current facility-administered medications for this visit. Allergies   Allergen Reactions    Keflex [Cephalexin]     Nsaids     Other      Plastic Tape    Pcn [Penicillins]        Health Maintenance   Topic Date Due    HIV screen  09/05/1996    Pneumococcal med risk (1 of 1 - PPSV23) 09/05/2000    Flu vaccine (1) 09/01/2017    Cervical cancer screen  09/19/2019    DTaP/Tdap/Td vaccine (2 - Td) 10/19/2027       Subjective:      Review of Systems   Constitutional: Negative for activity change, appetite change, chills, diaphoresis, fatigue, fever and unexpected weight change. Gastrointestinal: Negative for nausea and vomiting. Endocrine: Negative for polyuria. Genitourinary: Positive for dysuria, frequency and vaginal discharge. Negative for hematuria, menstrual problem, pelvic pain, urgency, vaginal bleeding and vaginal pain. Musculoskeletal: Negative for back pain. Skin: Negative for color change, pallor, rash and wound. Neurological: Negative for weakness and numbness. Hematological: Negative for adenopathy. Psychiatric/Behavioral: Negative for sleep disturbance. The patient is not nervous/anxious. Objective:     Physical Exam   Constitutional: She is oriented to person, place, and time. She appears well-developed and well-nourished. HENT:   Head: Normocephalic and atraumatic. Abdominal: Soft. There is no tenderness. Genitourinary: Vagina normal and uterus normal. There is no rash, tenderness or lesion on the right labia.  There is no rash, tenderness or Bilirubin, UA small     Ketones, UA trace     Spec Grav, UA 1.020     Blood, UA POC negative     pH, UA 5.5     Protein, UA POC 30 mg/dL     Urobilinogen, UA 1.0     Leukocytes, UA negative     Nitrite, UA negative        Orders Placed This Encounter   Procedures    C. Trachomatis / N. Gonorrhoeae, DNA     Standing Status:   Future     Standing Expiration Date:   12/8/2018    VAGINITIS DNA PROBE     Standing Status:   Future     Standing Expiration Date:   12/9/2018    Hepatitis B Surface Antigen     Standing Status:   Future     Standing Expiration Date:   12/8/2018    Hepatitis C Antibody     Standing Status:   Future     Standing Expiration Date:   12/8/2018    HIV Screen     Standing Status:   Future     Standing Expiration Date:   12/8/2018    RPR Reflex to Titer and TPPA     Please add trichomoniasis     Standing Status:   Future     Standing Expiration Date:   12/8/2018    POCT Urinalysis No Micro (Auto)     Orders Placed This Encounter   Medications    doxycycline hyclate (VIBRA-TABS) 100 MG tablet     Sig: Take 1 tablet by mouth 2 times daily for 7 days With food     Dispense:  14 tablet     Refill:  0       Patient given educational materials - see patient instructions. Discussed use, benefit, and side effects of prescribed medications. All patient questions answered. Pt voiced understanding. Reviewed health maintenance. Instructed to continue current medications, diet and exercise. Patient agreed with treatment plan. Follow up as directed.      Electronically signed by Shahana Lerma PA-C on 12/8/2017 at 2:01 PM

## 2017-12-11 LAB
C TRACH DNA GENITAL QL NAA+PROBE: NEGATIVE
N. GONORRHOEAE DNA: NEGATIVE

## 2017-12-11 RX ORDER — METRONIDAZOLE 500 MG/1
500 TABLET ORAL 2 TIMES DAILY
Qty: 14 TABLET | Refills: 0 | Status: SHIPPED | OUTPATIENT
Start: 2017-12-11 | End: 2017-12-18

## 2017-12-11 RX ORDER — ONDANSETRON 4 MG/1
4 TABLET, ORALLY DISINTEGRATING ORAL EVERY 8 HOURS PRN
Qty: 20 TABLET | Refills: 0 | Status: SHIPPED | OUTPATIENT
Start: 2017-12-11 | End: 2018-05-18 | Stop reason: SDUPTHER

## 2017-12-26 ENCOUNTER — OFFICE VISIT (OUTPATIENT)
Dept: FAMILY MEDICINE CLINIC | Age: 37
End: 2017-12-26
Payer: MEDICARE

## 2017-12-26 VITALS
BODY MASS INDEX: 24.34 KG/M2 | HEIGHT: 70 IN | DIASTOLIC BLOOD PRESSURE: 60 MMHG | RESPIRATION RATE: 18 BRPM | TEMPERATURE: 96.5 F | WEIGHT: 170 LBS | SYSTOLIC BLOOD PRESSURE: 118 MMHG | HEART RATE: 82 BPM | OXYGEN SATURATION: 99 %

## 2017-12-26 DIAGNOSIS — F41.9 ANXIETY: ICD-10-CM

## 2017-12-26 DIAGNOSIS — R00.2 PALPITATIONS: ICD-10-CM

## 2017-12-26 DIAGNOSIS — G47.00 INSOMNIA, UNSPECIFIED TYPE: ICD-10-CM

## 2017-12-26 DIAGNOSIS — G47.9 SLEEP DISTURBANCES: Primary | ICD-10-CM

## 2017-12-26 PROCEDURE — G8427 DOCREV CUR MEDS BY ELIG CLIN: HCPCS | Performed by: INTERNAL MEDICINE

## 2017-12-26 PROCEDURE — 4004F PT TOBACCO SCREEN RCVD TLK: CPT | Performed by: INTERNAL MEDICINE

## 2017-12-26 PROCEDURE — G8420 CALC BMI NORM PARAMETERS: HCPCS | Performed by: INTERNAL MEDICINE

## 2017-12-26 PROCEDURE — 99213 OFFICE O/P EST LOW 20 MIN: CPT | Performed by: INTERNAL MEDICINE

## 2017-12-26 PROCEDURE — G8484 FLU IMMUNIZE NO ADMIN: HCPCS | Performed by: INTERNAL MEDICINE

## 2017-12-26 RX ORDER — ALPRAZOLAM 0.5 MG/1
0.5 TABLET ORAL 3 TIMES DAILY PRN
Qty: 20 TABLET | Refills: 0 | Status: SHIPPED | OUTPATIENT
Start: 2017-12-26 | End: 2018-01-19 | Stop reason: SDUPTHER

## 2017-12-26 RX ORDER — LIDOCAINE 50 MG/G
1 PATCH TOPICAL
COMMUNITY
Start: 2017-12-23 | End: 2017-12-28

## 2017-12-26 RX ORDER — BUSPIRONE HYDROCHLORIDE 10 MG/1
10 TABLET ORAL 3 TIMES DAILY
Qty: 90 TABLET | Refills: 1 | Status: SHIPPED | OUTPATIENT
Start: 2017-12-26 | End: 2018-01-19

## 2017-12-26 ASSESSMENT — PATIENT HEALTH QUESTIONNAIRE - PHQ9
5. POOR APPETITE OR OVEREATING: 3
2. FEELING DOWN, DEPRESSED OR HOPELESS: 2
1. LITTLE INTEREST OR PLEASURE IN DOING THINGS: 2
9. THOUGHTS THAT YOU WOULD BE BETTER OFF DEAD, OR OF HURTING YOURSELF: 0
8. MOVING OR SPEAKING SO SLOWLY THAT OTHER PEOPLE COULD HAVE NOTICED. OR THE OPPOSITE, BEING SO FIGETY OR RESTLESS THAT YOU HAVE BEEN MOVING AROUND A LOT MORE THAN USUAL: 3
SUM OF ALL RESPONSES TO PHQ9 QUESTIONS 1 & 2: 4
4. FEELING TIRED OR HAVING LITTLE ENERGY: 3
SUM OF ALL RESPONSES TO PHQ QUESTIONS 1-9: 20
3. TROUBLE FALLING OR STAYING ASLEEP: 3
10. IF YOU CHECKED OFF ANY PROBLEMS, HOW DIFFICULT HAVE THESE PROBLEMS MADE IT FOR YOU TO DO YOUR WORK, TAKE CARE OF THINGS AT HOME, OR GET ALONG WITH OTHER PEOPLE: 3
6. FEELING BAD ABOUT YOURSELF - OR THAT YOU ARE A FAILURE OR HAVE LET YOURSELF OR YOUR FAMILY DOWN: 2
7. TROUBLE CONCENTRATING ON THINGS, SUCH AS READING THE NEWSPAPER OR WATCHING TELEVISION: 2

## 2017-12-26 ASSESSMENT — ENCOUNTER SYMPTOMS
DIARRHEA: 0
ABDOMINAL PAIN: 0
CONSTIPATION: 0

## 2017-12-26 NOTE — PROGRESS NOTES
[address] [facility] [place] [address]  Dept: [phone]  Dept Fax: [phone]    Teodora Huff is a 40 y.o. female who presents today for her medical conditions/complaints as noted below. Teodora Huff is c/o of   Chief Complaint   Patient presents with    Panic Attack     Had a number of panic attacks this week    Anxiety     Past week, situational    Back Pain     Shoulder area, ongoing, pain worseing          HPI:     Here for severe worsening of her panic attacks and anxiety. Stating the xanax is what was given to her in the past by previous provider and the only thing that took it away immediately. She does not want to wait 3-4 weeks for something to kick in. She states she has been out of this for a while. It looks like there has been referral to the past for psychiatry ; pt states she is going to see [facility] after this to get set up with counseling and wants to specifically see a psychiatrist to help her manage this long term ; has bene on prozac and or paxil and lexapro in the past and states none of them really seemed to help. No si/hi.          Hemoglobin A1C (%)   Date Value   04/11/2014 4.9             ( goal A1C is < 7)   No results found for: LABMICR  No results found for: LDLCHOLESTEROL, LDLCALC    (goal LDL is <100)   AST (U/L)   Date Value   07/29/2017 16     ALT (U/L)   Date Value   07/29/2017 15     BUN (mg/dL)   Date Value   11/13/2017 10     BP Readings from Last 3 Encounters:   12/26/17 118/60   12/08/17 124/64   11/24/17 110/68          (goal 120/80)    Past Medical History:   Diagnosis Date    Anemia     treated at [facility] 11/24/2014    Compression fracture     Depression 4/8/2014    Enlarged uterus     Hypertension     Hypertension     Menorrhagia     Smoker     Vitamin D deficiency 10/28/2015    Wedge compression fracture of T11 vertebra [facility])       Past Surgical 1-1/2\" 3 ML MISC U UTD Q 14 DAYS FOR 1 DOSE  11    Prenatal Vit-Fe Fumarate-FA (PREPLUS) 27-1 MG TABS TK 1 T PO QD  11    Cyanocobalamin (VITAMIN B 12 PO) Take by mouth      BIOTIN PO Take 10,000 mcg by mouth daily      Multiple Vitamin (MVI, BARIATRIC ADVANTAGE MULTI-FORMULA, CHEW TAB) Take 1 tablet by mouth 2 times daily 60 tablet 6     No current facility-administered medications for this visit. Allergies   Allergen Reactions    Keflex [Cephalexin]     Nsaids     Other      Plastic Tape    Pcn [Penicillins]        Health Maintenance   Topic Date Due    HIV screen  09/05/1996    Pneumococcal med risk (1 of 1 - PPSV23) 09/05/2000    Flu vaccine (1) 01/01/2019 (Originally 9/1/2017)    Cervical cancer screen  09/19/2019    DTaP/Tdap/Td vaccine (2 - Td) 10/19/2027       Subjective:      Review of Systems   Constitutional: Negative for fatigue, fever and unexpected weight change. Eyes: Negative for visual disturbance. Cardiovascular: Negative for chest pain. Gastrointestinal: Negative for abdominal pain, constipation and diarrhea. Musculoskeletal: Negative for arthralgias. Skin: Negative for rash. Neurological: Negative for headaches. Psychiatric/Behavioral: Positive for behavioral problems, decreased concentration and sleep disturbance. Negative for agitation and hallucinations. The patient is nervous/anxious. Objective:     Physical Exam   Constitutional: She is oriented to person, place, and time. She appears well-developed and well-nourished. She appears distressed. Tearful during exam/ visit    HENT:   Right Ear: External ear normal.   Left Ear: External ear normal.   Nose: Nose normal.   Eyes: EOM are normal. Pupils are equal, round, and reactive to light. Cardiovascular: Normal rate, regular rhythm and normal heart sounds. Pulmonary/Chest: Effort normal and breath sounds normal.   Abdominal: Soft. Bowel sounds are normal. There is no splenomegaly or hepatomegaly.

## 2017-12-26 NOTE — PROGRESS NOTES
Visit Information    Have you changed or started any medications since your last visit including any over-the-counter medicines, vitamins, or herbal medicines? yes - From ER   Are you having any side effects from any of your medications? -  no  Have you stopped taking any of your medications? Is so, why? -  no    Have you seen any other physician or provider since your last visit? Yes - Records Obtained  Have you had any other diagnostic tests since your last visit? Yes - Records Obtained  Have you been seen in the emergency room and/or had an admission to a hospital since we last saw you? Yes - Records Obtained  Have you had your routine dental cleaning in the past 6 months? no    Have you activated your [employer] account? If not, what are your barriers?  Yes     Patient Care Team:  Bishnu Humphrey PA-C as PCP - [address] [place] [address] DO as Consulting Physician (Obstetrics & Gynecology)    Medical History Review  Past Medical, Family, and Social History reviewed and does contribute to the patient presenting condition    Health Maintenance   Topic Date Due    HIV screen  09/05/1996    Pneumococcal med risk (1 of 1 - PPSV23) 09/05/2000    Flu vaccine (1) 01/01/2019 (Originally 9/1/2017)    Cervical cancer screen  09/19/2019    DTaP/Tdap/Td vaccine (2 - Td) 10/19/2027

## 2017-12-26 NOTE — PATIENT INSTRUCTIONS
still get nicotine but you do not use tobacco. Nicotine replacement products help you slowly reduce the amount of nicotine you need. These products come in several forms, many of them available over-the-counter:  ¨ Nicotine patches  ¨ Nicotine gum and lozenges  ¨ Nicotine inhaler  · Ask your doctor about bupropion (Wellbutrin) or varenicline (Chantix), which are prescription medicines. They do not contain nicotine. They help you by reducing withdrawal symptoms, such as stress and anxiety. · Some people find hypnosis, acupuncture, and massage helpful for ending the smoking habit. · Eat a healthy diet and get regular exercise. Having healthy habits will help your body move past its craving for nicotine. · Be prepared to keep trying. Most people are not successful the first few times they try to quit. Do not get mad at yourself if you smoke again. Make a list of things you learned and think about when you want to try again, such as next week, next month, or next year. Where can you learn more? Go to https://[employer]. org and sign in to your [employer] account. Enter A157 in the [employer] box to learn more about \"Stopping Smoking: Care Instructions. \"     If you do not have an account, please click on the \"Sign Up Now\" link. Current as of: March 20, 2017  Content Version: 11.4  © 0720-1085 Healthwise, Incorporated. Care instructions adapted under license by [facility] ([facility]). If you have questions about a medical condition or this instruction, always ask your healthcare professional. [place] [address] any warranty or liability for your use of this information.

## 2017-12-29 ENCOUNTER — HOSPITAL ENCOUNTER (EMERGENCY)
Age: 37
Discharge: HOME | End: 2017-12-29
Attending: EMERGENCY MEDICINE
Payer: MEDICARE

## 2017-12-29 ENCOUNTER — APPOINTMENT (OUTPATIENT)
Dept: GENERAL RADIOLOGY | Age: 37
End: 2017-12-29
Payer: MEDICARE

## 2017-12-29 VITALS
TEMPERATURE: 97.4 F | RESPIRATION RATE: 20 BRPM | DIASTOLIC BLOOD PRESSURE: 69 MMHG | SYSTOLIC BLOOD PRESSURE: 113 MMHG | OXYGEN SATURATION: 99 % | HEART RATE: 84 BPM | BODY MASS INDEX: 23.8 KG/M2 | HEIGHT: 71 IN | WEIGHT: 170 LBS

## 2017-12-29 DIAGNOSIS — S09.90XA INJURY OF HEAD, INITIAL ENCOUNTER: ICD-10-CM
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
## 2017-12-29 DIAGNOSIS — M54.2 NECK PAIN: Primary | ICD-10-CM

## 2017-12-29 PROCEDURE — 72040 X-RAY EXAM NECK SPINE 2-3 VW: CPT

## 2017-12-29 PROCEDURE — 99284 EMERGENCY DEPT VISIT MOD MDM: CPT

## 2017-12-29 PROCEDURE — 6370000000 HC RX 637 (ALT 250 FOR IP): Performed by: EMERGENCY MEDICINE

## 2017-12-29 RX ORDER — ACETAMINOPHEN AND CODEINE PHOSPHATE 300; 30 MG/1; MG/1
1 TABLET ORAL ONCE
Status: COMPLETED | OUTPATIENT
Start: 2017-12-29 | End: 2017-12-29

## 2017-12-29 RX ORDER — ACETAMINOPHEN AND CODEINE PHOSPHATE 300; 30 MG/1; MG/1
1 TABLET ORAL EVERY 4 HOURS PRN
Qty: 10 TABLET | Refills: 0 | Status: SHIPPED | OUTPATIENT
Start: 2017-12-29 | End: 2017-12-29

## 2017-12-29 RX ORDER — ACETAMINOPHEN AND CODEINE PHOSPHATE 300; 30 MG/1; MG/1
1 TABLET ORAL EVERY 6 HOURS PRN
Qty: 10 TABLET | Refills: 0 | Status: SHIPPED | OUTPATIENT
Start: 2017-12-29 | End: 2018-01-01

## 2017-12-29 RX ADMIN — ACETAMINOPHEN AND CODEINE PHOSPHATE 1 TABLET: 300; 30 TABLET ORAL at 01:14

## 2017-12-29 ASSESSMENT — PAIN SCALES - GENERAL
PAINLEVEL_OUTOF10: 9
PAINLEVEL_OUTOF10: 9
PAINLEVEL_OUTOF10: 7

## 2017-12-29 ASSESSMENT — PAIN DESCRIPTION - PAIN TYPE: TYPE: ACUTE PAIN

## 2017-12-29 NOTE — ED PROVIDER NOTES
R-0Print      ondansetron (ZOFRAN ODT) 4 MG disintegrating tablet Take 1 tablet by mouth every 8 hours as needed for Nausea or Vomiting, Disp-20 tablet, R-0Print      famotidine (PEPCID) 20 MG tablet R-2Historical Med      cyanocobalamin 1000 MCG/ML injection INJECT 1 ML INTO THE APPROPRIATE MUSCLE Q 14 DAYS, R-11Historical Med      Cholecalciferol (VITAMIN D3) 39980 units CAPS Take 50,000 Units by mouthHistorical Med      B-D 3CC LUER-VIKTORIYA SYR 25GX1/2\" 25G X 1-1/2\" 3 ML MISC R-11, GABRIEL, Historical Med      Prenatal Vit-Fe Fumarate-FA (PREPLUS) 27-1 MG TABS TK 1 T PO QD, R-11Historical Med      BIOTIN PO Take 10,000 mcg by mouth dailyHistorical Med      Multiple Vitamin (MVI, BARIATRIC ADVANTAGE MULTI-FORMULA, CHEW TAB) Take 1 tablet by mouth 2 times daily, Disp-60 tablet, R-6      busPIRone (BUSPAR) 10 MG tablet Take 1 tablet by mouth 3 times daily, Disp-90 tablet, R-1Normal      Cyanocobalamin (VITAMIN B 12 PO) Take by mouthHistorical Med           ALLERGIES     is allergic to keflex [cephalexin]; nsaids; other; and pcn [penicillins]. FAMILY HISTORY     indicated that her mother is alive. She indicated that her father is alive. She indicated that her sister is alive. She indicated that her brother is alive. She indicated that her maternal grandmother is alive. She indicated that her maternal grandfather is . She indicated that her paternal grandmother is alive. She indicated that her paternal grandfather is . She indicated that the status of her neg hx is unknown. SOCIAL HISTORY      reports that she has been smoking Cigarettes. She has a 15.00 pack-year smoking history. She has never used smokeless tobacco. She reports that she drinks alcohol. She reports that she does not use drugs.   PHYSICAL EXAM     INITIAL VITALS: /69   Pulse 84   Temp 97.4 °F (36.3 °C) (Oral)   Resp 20   Ht 5' 11\" (1.803 m)   Wt 170 lb (77.1 kg)   LMP 2017 (Exact Date)   SpO2 99%   BMI 23.71 kg/m² Physical Exam   Constitutional: She is oriented to person, place, and time. She appears well-developed and well-nourished. No distress. HENT:   Head: Normocephalic and atraumatic. Nose: Nose normal.   Mouth/Throat: Oropharynx is clear and moist.   No visible signs of trauma noted on head. Eyes: Conjunctivae and EOM are normal. Pupils are equal, round, and reactive to light. Neck: Normal range of motion. Neck supple. paracervical and parathoracic tenderness. Cardiovascular: Normal rate, regular rhythm, normal heart sounds and intact distal pulses. Exam reveals no gallop and no friction rub. No murmur heard. Pulmonary/Chest: Effort normal and breath sounds normal. No stridor. No respiratory distress. She has no wheezes. She has no rales. She exhibits no tenderness. Abdominal: Soft. Bowel sounds are normal. She exhibits no distension. There is no tenderness. There is no rebound and no guarding. Musculoskeletal: Normal range of motion. She exhibits no edema, tenderness or deformity. Neurological: She is alert and oriented to person, place, and time. No cranial nerve deficit. Skin: Skin is warm and dry. No rash noted. She is not diaphoretic. No erythema. Psychiatric: She has a normal mood and affect. Her behavior is normal. Judgment and thought content normal.   Nursing note and vitals reviewed. MEDICAL DECISION MAKING:   [facility]    We'll provide patient medication, image neck and disposition per findings. Procedures    DIAGNOSTIC RESULTS   EKG: All EKG's are interpreted by the Emergency Department Physician who either signs or Co-signs this chart in the absence of a cardiologist.      RADIOLOGY:All plain film, CT, MRI, and formal ultrasound images (except ED bedside ultrasound) are read by the radiologist and interpretations are viewed by the emergency physician. XR CERVICAL SPINE (2-3 VIEWS)   Final Result   No acute abnormality of the cervical spine.            LABS: All lab results

## 2018-01-04 ENCOUNTER — TELEPHONE (OUTPATIENT)
Dept: FAMILY MEDICINE CLINIC | Age: 38
End: 2018-01-04

## 2018-01-05 RX ORDER — PNV,CALCIUM 72/IRON/FOLIC ACID 27 MG-1 MG
TABLET ORAL
Qty: 30 TABLET | Refills: 11 | Status: SHIPPED | OUTPATIENT
Start: 2018-01-05 | End: 2018-01-17 | Stop reason: ALTCHOICE

## 2018-01-17 ENCOUNTER — TELEPHONE (OUTPATIENT)
Dept: FAMILY MEDICINE CLINIC | Age: 38
End: 2018-01-17

## 2018-01-17 RX ORDER — MULTIVIT-MIN/FOLIC/VIT K/LYCOP 400-300MCG
1 TABLET ORAL 2 TIMES DAILY
Qty: 180 TABLET | Refills: 3 | OUTPATIENT
Start: 2018-01-17

## 2018-01-19 ENCOUNTER — OFFICE VISIT (OUTPATIENT)
Dept: FAMILY MEDICINE CLINIC | Age: 38
End: 2018-01-19
Payer: MEDICARE

## 2018-01-19 VITALS
HEIGHT: 70 IN | OXYGEN SATURATION: 99 % | SYSTOLIC BLOOD PRESSURE: 116 MMHG | DIASTOLIC BLOOD PRESSURE: 82 MMHG | WEIGHT: 173 LBS | BODY MASS INDEX: 24.77 KG/M2 | HEART RATE: 65 BPM

## 2018-01-19 DIAGNOSIS — F41.9 ANXIETY: ICD-10-CM

## 2018-01-19 DIAGNOSIS — R41.840 CONCENTRATION DEFICIT: Primary | ICD-10-CM

## 2018-01-19 PROCEDURE — G8427 DOCREV CUR MEDS BY ELIG CLIN: HCPCS | Performed by: PHYSICIAN ASSISTANT

## 2018-01-19 PROCEDURE — 4004F PT TOBACCO SCREEN RCVD TLK: CPT | Performed by: PHYSICIAN ASSISTANT

## 2018-01-19 PROCEDURE — G8420 CALC BMI NORM PARAMETERS: HCPCS | Performed by: PHYSICIAN ASSISTANT

## 2018-01-19 PROCEDURE — 99213 OFFICE O/P EST LOW 20 MIN: CPT | Performed by: PHYSICIAN ASSISTANT

## 2018-01-19 PROCEDURE — G8484 FLU IMMUNIZE NO ADMIN: HCPCS | Performed by: PHYSICIAN ASSISTANT

## 2018-01-19 RX ORDER — ALPRAZOLAM 0.5 MG/1
0.5 TABLET ORAL NIGHTLY PRN
Qty: 20 TABLET | Refills: 0 | Status: SHIPPED | OUTPATIENT
Start: 2018-01-19 | End: 2018-02-18

## 2018-01-19 ASSESSMENT — ENCOUNTER SYMPTOMS
COLOR CHANGE: 0
COUGH: 0
SHORTNESS OF BREATH: 0
WHEEZING: 0

## 2018-02-06 ENCOUNTER — HOSPITAL ENCOUNTER (EMERGENCY)
Age: 38
Discharge: HOME | End: 2018-02-07
Attending: EMERGENCY MEDICINE
Payer: MEDICARE

## 2018-02-06 VITALS
HEART RATE: 71 BPM | SYSTOLIC BLOOD PRESSURE: 118 MMHG | TEMPERATURE: 98.1 F | OXYGEN SATURATION: 100 % | RESPIRATION RATE: 18 BRPM | DIASTOLIC BLOOD PRESSURE: 75 MMHG

## 2018-02-06 DIAGNOSIS — R42 DIZZINESS: Primary | ICD-10-CM

## 2018-02-06 LAB
ABSOLUTE EOS #: 0.15 K/UL (ref 0–0.44)
ABSOLUTE IMMATURE GRANULOCYTE: 0.03 K/UL (ref 0–0.3)
ABSOLUTE LYMPH #: 2.58 K/UL (ref 1.1–3.7)
ABSOLUTE MONO #: 0.57 K/UL (ref 0.1–1.2)
ANION GAP SERPL CALCULATED.3IONS-SCNC: 10 MMOL/L (ref 9–17)
BASOPHILS # BLD: 0 % (ref 0–2)
BASOPHILS ABSOLUTE: 0.03 K/UL (ref 0–0.2)
BUN BLDV-MCNC: 13 MG/DL (ref 6–20)
BUN/CREAT BLD: NORMAL (ref 9–20)
CALCIUM SERPL-MCNC: 9 MG/DL (ref 8.6–10.4)
CHLORIDE BLD-SCNC: 104 MMOL/L (ref 98–107)
CO2: 27 MMOL/L (ref 20–31)
CREAT SERPL-MCNC: 0.6 MG/DL (ref 0.5–0.9)
DIFFERENTIAL TYPE: NORMAL
EOSINOPHILS RELATIVE PERCENT: 2 % (ref 1–4)
GFR AFRICAN AMERICAN: >60 ML/MIN
GFR NON-AFRICAN AMERICAN: >60 ML/MIN
GFR SERPL CREATININE-BSD FRML MDRD: NORMAL ML/MIN/{1.73_M2}
GFR SERPL CREATININE-BSD FRML MDRD: NORMAL ML/MIN/{1.73_M2}
GLUCOSE BLD-MCNC: 85 MG/DL (ref 70–99)
HCT VFR BLD CALC: 41.7 % (ref 36.3–47.1)
HEMOGLOBIN: 13.8 G/DL (ref 11.9–15.1)
IMMATURE GRANULOCYTES: 0 %
LYMPHOCYTES # BLD: 29 % (ref 24–43)
MCH RBC QN AUTO: 32.3 PG (ref 25.2–33.5)
MCHC RBC AUTO-ENTMCNC: 33.1 G/DL (ref 28.4–34.8)
MCV RBC AUTO: 97.7 FL (ref 82.6–102.9)
MONOCYTES # BLD: 6 % (ref 3–12)
NRBC AUTOMATED: 0 PER 100 WBC
PDW BLD-RTO: 12.9 % (ref 11.8–14.4)
PLATELET # BLD: 286 K/UL (ref 138–453)
PLATELET ESTIMATE: NORMAL
PMV BLD AUTO: 11.2 FL (ref 8.1–13.5)
POTASSIUM SERPL-SCNC: 4.3 MMOL/L (ref 3.7–5.3)
RBC # BLD: 4.27 M/UL (ref 3.95–5.11)
RBC # BLD: NORMAL 10*6/UL
SEG NEUTROPHILS: 63 % (ref 36–65)
SEGMENTED NEUTROPHILS ABSOLUTE COUNT: 5.56 K/UL (ref 1.5–8.1)
SODIUM BLD-SCNC: 141 MMOL/L (ref 135–144)
WBC # BLD: 8.9 K/UL (ref 3.5–11.3)
WBC # BLD: NORMAL 10*3/UL

## 2018-02-06 PROCEDURE — 80048 BASIC METABOLIC PNL TOTAL CA: CPT

## 2018-02-06 PROCEDURE — 99284 EMERGENCY DEPT VISIT MOD MDM: CPT

## 2018-02-06 PROCEDURE — 2580000003 HC RX 258: Performed by: EMERGENCY MEDICINE

## 2018-02-06 PROCEDURE — 6360000002 HC RX W HCPCS: Performed by: EMERGENCY MEDICINE

## 2018-02-06 PROCEDURE — 96375 TX/PRO/DX INJ NEW DRUG ADDON: CPT

## 2018-02-06 PROCEDURE — 85025 COMPLETE CBC W/AUTO DIFF WBC: CPT

## 2018-02-06 PROCEDURE — 96374 THER/PROPH/DIAG INJ IV PUSH: CPT

## 2018-02-06 PROCEDURE — 6370000000 HC RX 637 (ALT 250 FOR IP): Performed by: EMERGENCY MEDICINE

## 2018-02-06 RX ORDER — METOCLOPRAMIDE HYDROCHLORIDE 5 MG/ML
10 INJECTION INTRAMUSCULAR; INTRAVENOUS ONCE
Status: COMPLETED | OUTPATIENT
Start: 2018-02-06 | End: 2018-02-06

## 2018-02-06 RX ORDER — DEXAMETHASONE SODIUM PHOSPHATE 10 MG/ML
10 INJECTION INTRAMUSCULAR; INTRAVENOUS ONCE
Status: COMPLETED | OUTPATIENT
Start: 2018-02-06 | End: 2018-02-06

## 2018-02-06 RX ORDER — MECLIZINE HCL 12.5 MG/1
12.5 TABLET ORAL ONCE
Status: DISCONTINUED | OUTPATIENT
Start: 2018-02-06 | End: 2018-02-06

## 2018-02-06 RX ORDER — MECLIZINE HCL 12.5 MG/1
25 TABLET ORAL ONCE
Status: COMPLETED | OUTPATIENT
Start: 2018-02-06 | End: 2018-02-06

## 2018-02-06 RX ORDER — HYDROXYZINE HYDROCHLORIDE 50 MG/ML
25 INJECTION, SOLUTION INTRAMUSCULAR ONCE
Status: DISCONTINUED | OUTPATIENT
Start: 2018-02-07 | End: 2018-02-07 | Stop reason: HOSPADM

## 2018-02-06 RX ORDER — DIPHENHYDRAMINE HYDROCHLORIDE 50 MG/ML
25 INJECTION INTRAMUSCULAR; INTRAVENOUS ONCE
Status: COMPLETED | OUTPATIENT
Start: 2018-02-06 | End: 2018-02-06

## 2018-02-06 RX ORDER — 0.9 % SODIUM CHLORIDE 0.9 %
1000 INTRAVENOUS SOLUTION INTRAVENOUS ONCE
Status: COMPLETED | OUTPATIENT
Start: 2018-02-06 | End: 2018-02-07

## 2018-02-06 RX ADMIN — DEXAMETHASONE SODIUM PHOSPHATE 10 MG: 10 INJECTION INTRAMUSCULAR; INTRAVENOUS at 23:32

## 2018-02-06 RX ADMIN — DIPHENHYDRAMINE HYDROCHLORIDE 25 MG: 50 INJECTION, SOLUTION INTRAMUSCULAR; INTRAVENOUS at 23:31

## 2018-02-06 RX ADMIN — METOCLOPRAMIDE 10 MG: 5 INJECTION, SOLUTION INTRAMUSCULAR; INTRAVENOUS at 23:33

## 2018-02-06 RX ADMIN — SODIUM CHLORIDE 1000 ML: 9 INJECTION, SOLUTION INTRAVENOUS at 23:31

## 2018-02-06 RX ADMIN — MECLIZINE HCL 25 MG: 12.5 TABLET ORAL at 23:30

## 2018-02-06 ASSESSMENT — PAIN DESCRIPTION - PAIN TYPE: TYPE: ACUTE PAIN

## 2018-02-06 ASSESSMENT — PAIN SCALES - GENERAL: PAINLEVEL_OUTOF10: 7

## 2018-02-06 ASSESSMENT — PAIN DESCRIPTION - ORIENTATION: ORIENTATION: ANTERIOR

## 2018-02-06 ASSESSMENT — PAIN DESCRIPTION - LOCATION: LOCATION: HEAD

## 2018-02-06 ASSESSMENT — PAIN DESCRIPTION - DESCRIPTORS: DESCRIPTORS: ACHING;CRUSHING

## 2018-02-07 RX ORDER — HYDROXYZINE HYDROCHLORIDE 25 MG/1
25 TABLET, FILM COATED ORAL EVERY 8 HOURS PRN
Qty: 15 TABLET | Refills: 0 | Status: SHIPPED | OUTPATIENT
Start: 2018-02-07 | End: 2018-02-15 | Stop reason: SDUPTHER

## 2018-02-07 ASSESSMENT — ENCOUNTER SYMPTOMS
SORE THROAT: 0
ABDOMINAL PAIN: 0
EYE DISCHARGE: 0
NAUSEA: 0
STRIDOR: 0
SHORTNESS OF BREATH: 0
BLOOD IN STOOL: 0
ABDOMINAL DISTENTION: 0
DIARRHEA: 0
WHEEZING: 0
COUGH: 0
VOMITING: 0
BACK PAIN: 0
EYE PAIN: 0
CHEST TIGHTNESS: 0

## 2018-02-07 NOTE — ED NOTES
Pt tearful and feeling panicy and states she is refusing her MRI.         Josue Alaniz, LORENZO  02/06/18 1906

## 2018-02-07 NOTE — ED PROVIDER NOTES
[facility]  ED  Emergency Department Encounter  Emergency Medicine Resident     Pt Name: Guanakito Helm  MRN: [id number]  [place] 1980  Date of evaluation: 18  PCP:  Gato Herrera [address] [place] [address]        Chief Complaint   Patient presents with    Dizziness    Head Injury    Altered Mental Status       HISTORY OF PRESENT ILLNESS  (Location/Symptom, Timing/Onset, Context/Setting, Quality, Duration, Modifying Factors, Severity.)      Guanakito Helm is a 40 y.o. female who presents with chief complaint of dizziness, lightheadedness, headache. Patient states that she's had intermittent dizziness over the last 6 months. When the dizziness causes she is slightly room is spinning, it is associated with nausea but no vomiting. She had a dizzy episode today about point she decided to come to the hospital for evaluation. While she was going upstairs to get dressed she fell forward, she had a head on the staircase. She does not think she lost consciousness. She's also had a mild headache today, the onset of the headache was gradual, was not maximal at onset him. Consistent with a history of migraine headaches. Review of systems otherwise negative for fever, chills, chest pain, shortness of breath. Her medical history is significant for Friday, depression. She has a history of Shayna-en-Y gastric bypass surgery about a year and half ago. PAST MEDICAL / SURGICAL / SOCIAL / FAMILY HISTORY      has a past medical history of Anemia; Anxiety; Compression fracture; Depression; Enlarged uterus; Hypertension; Hypertension; Menorrhagia; Smoker; Vitamin D deficiency; and Wedge compression fracture of T11 vertebra ([facility] [address] has a past surgical history that includes Tonsillectomy (age 4);  section (); Tubal ligation (); hysteroscopy (15); Endometrial ablation (14);  Hemorrhoid surgery (N/A); toenail excision (Bilateral, 2016); and Gastric bypass Mouth/Throat: No oropharyngeal exudate. Eyes: EOM are normal. Pupils are equal, round, and reactive to light. Right eye exhibits no discharge. Left eye exhibits no discharge. Neck: Neck supple. No tracheal deviation present. Cardiovascular: Normal rate, regular rhythm and normal heart sounds. Pulmonary/Chest: Effort normal and breath sounds normal. No respiratory distress. She has no wheezes. She has no rales. She exhibits no tenderness. Abdominal: Soft. Normal appearance and bowel sounds are normal. She exhibits no distension. There is no tenderness. There is no rebound and no guarding. Musculoskeletal: Normal range of motion. She exhibits no edema or tenderness. Neurological: She is alert and oriented to person, place, and time. No cranial nerve deficit. Coordination (positive Romberg test ) abnormal. Gait normal. GCS eye subscore is 4. GCS verbal subscore is 5. GCS motor subscore is 6. On neurological exam strength is intact 5 out of 5 bilateral upper and lower extremities, sensation is intact throughout to light touch. The tongue protrusion midline, there is no facial droop, sensation is intact in the face bilaterally. Cerebellar testing is intact to finger-to-nose testing. She does have positive Romberg testing. the pupil is 3 mm and reactive bilaterally without any nystagmus   Skin: Skin is dry. No rash noted. No erythema. Psychiatric: She has a normal mood and affect.        DIFFERENTIAL  DIAGNOSIS     PLAN (LABS / IMAGING / EKG):  Orders Placed This Encounter   Procedures    MRI BRAIN WO CONTRAST    MRA HEAD WO CONTRAST    CBC WITH AUTO DIFFERENTIAL    BASIC METABOLIC PANEL    Orthostatic blood pressure and pulse       MEDICATIONS ORDERED:  Orders Placed This Encounter   Medications    metoclopramide (REGLAN) injection 10 mg    dexamethasone (DECADRON) injection 10 mg    diphenhydrAMINE (BENADRYL) injection 25 mg    DISCONTD: meclizine (ANTIVERT) tablet 12.5 mg    meclizine the emergency department if there is worsening of symptoms. [KO]      ED Course User Index  [KO] Chai Gallardo MD         PROCEDURES:  None    CONSULTS:  None    CRITICAL CARE:  None    FINAL IMPRESSION      1. Dizziness          DISPOSITION / PLAN     DISPOSITION Decision To Discharge 02/07/2018 12:13:40 AM      PATIENT REFERRED TO:  No follow-up provider specified. DISCHARGE MEDICATIONS:  New Prescriptions    HYDROXYZINE (ATARAX) 25 MG TABLET    Take 1 tablet by mouth every 8 hours as needed for Itching       Chai Gallardo MD  Emergency Medicine Resident    (Please note that portions of this note were completed with a voice recognition program.  Efforts were made to edit the dictations but occasionally words are mis-transcribed.      Chai Gallardo MD  Resident  02/07/18 [id number]

## 2018-02-08 ENCOUNTER — OFFICE VISIT (OUTPATIENT)
Dept: FAMILY MEDICINE CLINIC | Age: 38
End: 2018-02-08
Payer: MEDICARE

## 2018-02-08 VITALS
HEART RATE: 57 BPM | TEMPERATURE: 97.8 F | HEIGHT: 70 IN | DIASTOLIC BLOOD PRESSURE: 62 MMHG | OXYGEN SATURATION: 98 % | SYSTOLIC BLOOD PRESSURE: 106 MMHG | WEIGHT: 177 LBS | BODY MASS INDEX: 25.34 KG/M2

## 2018-02-08 DIAGNOSIS — R42 DIZZINESS: Primary | ICD-10-CM

## 2018-02-08 PROCEDURE — 99213 OFFICE O/P EST LOW 20 MIN: CPT | Performed by: PHYSICIAN ASSISTANT

## 2018-02-08 PROCEDURE — G8484 FLU IMMUNIZE NO ADMIN: HCPCS | Performed by: PHYSICIAN ASSISTANT

## 2018-02-08 PROCEDURE — G8419 CALC BMI OUT NRM PARAM NOF/U: HCPCS | Performed by: PHYSICIAN ASSISTANT

## 2018-02-08 PROCEDURE — 4004F PT TOBACCO SCREEN RCVD TLK: CPT | Performed by: PHYSICIAN ASSISTANT

## 2018-02-08 PROCEDURE — G8427 DOCREV CUR MEDS BY ELIG CLIN: HCPCS | Performed by: PHYSICIAN ASSISTANT

## 2018-02-08 ASSESSMENT — ENCOUNTER SYMPTOMS
ABDOMINAL PAIN: 0
COUGH: 0
COLOR CHANGE: 0
SHORTNESS OF BREATH: 0
NAUSEA: 0
CONSTIPATION: 0
DIARRHEA: 0
VOMITING: 0
WHEEZING: 0

## 2018-02-08 ASSESSMENT — PATIENT HEALTH QUESTIONNAIRE - PHQ9
SUM OF ALL RESPONSES TO PHQ QUESTIONS 1-9: 0
2. FEELING DOWN, DEPRESSED OR HOPELESS: 0
1. LITTLE INTEREST OR PLEASURE IN DOING THINGS: 0
SUM OF ALL RESPONSES TO PHQ9 QUESTIONS 1 & 2: 0

## 2018-02-08 NOTE — PROGRESS NOTES
[address] [facility] [address]  Dept: [phone]  Dept Fax: [phone]    Darien Cm is a 40 y.o. female who presents today for her medical conditions/complaints as noted below. Darien Cm is c/o of   Chief Complaint   Patient presents with    Dizziness     Patient here for follow up from er visit. HPI:     HPI    Patient states recently having spells of dizziness  States went to the ER on the day when she was dizzy 4 different times. Each episode will last 15-20 seconds.  She states she has fallen  At the ER they recommended MRI brain but per pt she had reglan there and it made her feel poorly so she opted to go home and no MRI was done  She states she still feels some dizziness but is not as bad as on the ER day    Hemoglobin A1C (%)   Date Value   2014 4.9             ( goal A1C is < 7)   No results found for: LABMICR  No results found for: LDLCHOLESTEROL, LDLCALC    (goal LDL is <100)   AST (U/L)   Date Value   2017 16     ALT (U/L)   Date Value   2017 15     BUN (mg/dL)   Date Value   2018 13     BP Readings from Last 3 Encounters:   18 106/62   18 118/75   18 116/82          (goal 120/80)    Past Medical History:   Diagnosis Date    Anemia     [facility] at [facility] 2014    Compression fracture     Depression 2014    Enlarged uterus     Hypertension     Hypertension     Menorrhagia     Smoker     Vitamin D deficiency 10/28/2015    Wedge compression fracture of T11 vertebra [facility])       Past Surgical History:   Procedure Laterality Date     SECTION      [place]   [place] ENDOMETRIAL ABLATION  14    [facility]    GASTRIC BYPASS SURGERY      16    HEMORRHOID SURGERY N/A     HYSTEROSCOPY  14    endometrial ablaation    TOENAIL EXCISION Bilateral 2016    TONSILLECTOMY  age 3    TUBAL

## 2018-02-13 DIAGNOSIS — F41.9 ANXIETY: ICD-10-CM

## 2018-02-13 RX ORDER — ESCITALOPRAM OXALATE 10 MG/1
10 TABLET ORAL DAILY
Qty: 30 TABLET | Refills: 5 | Status: SHIPPED | OUTPATIENT
Start: 2018-02-13 | End: 2018-06-16

## 2018-02-14 DIAGNOSIS — R41.840 CONCENTRATION DEFICIT: ICD-10-CM

## 2018-02-14 DIAGNOSIS — F41.9 ANXIETY: ICD-10-CM

## 2018-02-14 RX ORDER — ALPRAZOLAM 0.5 MG/1
0.5 TABLET ORAL NIGHTLY PRN
Qty: 20 TABLET | Refills: 0 | OUTPATIENT
Start: 2018-02-14 | End: 2018-03-16

## 2018-02-14 NOTE — TELEPHONE ENCOUNTER
Patient called stating she usually doesn't take this med daily she usually does PRN but lately she has been having a lot of panic attacks lately and is almost out and is asking for a refill. Please advise.  Thank you

## 2018-02-15 RX ORDER — HYDROXYZINE HYDROCHLORIDE 25 MG/1
25 TABLET, FILM COATED ORAL EVERY 8 HOURS PRN
Qty: 30 TABLET | Refills: 0 | Status: SHIPPED | OUTPATIENT
Start: 2018-02-15 | End: 2018-06-16

## 2018-02-22 ENCOUNTER — HOSPITAL ENCOUNTER (OUTPATIENT)
Dept: MRI IMAGING | Age: 38
Discharge: HOME | End: 2018-02-24
Payer: MEDICARE

## 2018-02-22 ENCOUNTER — APPOINTMENT (OUTPATIENT)
Dept: MRI IMAGING | Age: 38
End: 2018-02-22
Payer: MEDICARE

## 2018-02-22 DIAGNOSIS — R42 DIZZINESS: ICD-10-CM

## 2018-02-22 PROCEDURE — 70544 MR ANGIOGRAPHY HEAD W/O DYE: CPT

## 2018-02-22 PROCEDURE — 70551 MRI BRAIN STEM W/O DYE: CPT

## 2018-02-26 ENCOUNTER — OFFICE VISIT (OUTPATIENT)
Dept: FAMILY MEDICINE CLINIC | Age: 38
End: 2018-02-26
Payer: MEDICARE

## 2018-02-26 VITALS
WEIGHT: 175.6 LBS | TEMPERATURE: 98.5 F | HEIGHT: 68 IN | BODY MASS INDEX: 26.61 KG/M2 | OXYGEN SATURATION: 96 % | DIASTOLIC BLOOD PRESSURE: 60 MMHG | HEART RATE: 61 BPM | SYSTOLIC BLOOD PRESSURE: 118 MMHG

## 2018-02-26 DIAGNOSIS — R42 DIZZINESS: Primary | ICD-10-CM

## 2018-02-26 PROCEDURE — G8484 FLU IMMUNIZE NO ADMIN: HCPCS | Performed by: PHYSICIAN ASSISTANT

## 2018-02-26 PROCEDURE — 4004F PT TOBACCO SCREEN RCVD TLK: CPT | Performed by: PHYSICIAN ASSISTANT

## 2018-02-26 PROCEDURE — G8427 DOCREV CUR MEDS BY ELIG CLIN: HCPCS | Performed by: PHYSICIAN ASSISTANT

## 2018-02-26 PROCEDURE — 99213 OFFICE O/P EST LOW 20 MIN: CPT | Performed by: PHYSICIAN ASSISTANT

## 2018-02-26 PROCEDURE — G8419 CALC BMI OUT NRM PARAM NOF/U: HCPCS | Performed by: PHYSICIAN ASSISTANT

## 2018-02-26 RX ORDER — MECLIZINE HYDROCHLORIDE 25 MG/1
25 TABLET ORAL 3 TIMES DAILY PRN
Qty: 30 TABLET | Refills: 0 | Status: SHIPPED | OUTPATIENT
Start: 2018-02-26 | End: 2018-03-08

## 2018-02-26 ASSESSMENT — PATIENT HEALTH QUESTIONNAIRE - PHQ9
4. FEELING TIRED OR HAVING LITTLE ENERGY: 3
7. TROUBLE CONCENTRATING ON THINGS, SUCH AS READING THE NEWSPAPER OR WATCHING TELEVISION: 3
1. LITTLE INTEREST OR PLEASURE IN DOING THINGS: 2
5. POOR APPETITE OR OVEREATING: 0
SUM OF ALL RESPONSES TO PHQ QUESTIONS 1-9: 15
SUM OF ALL RESPONSES TO PHQ9 QUESTIONS 1 & 2: 4
6. FEELING BAD ABOUT YOURSELF - OR THAT YOU ARE A FAILURE OR HAVE LET YOURSELF OR YOUR FAMILY DOWN: 0
9. THOUGHTS THAT YOU WOULD BE BETTER OFF DEAD, OR OF HURTING YOURSELF: 0
10. IF YOU CHECKED OFF ANY PROBLEMS, HOW DIFFICULT HAVE THESE PROBLEMS MADE IT FOR YOU TO DO YOUR WORK, TAKE CARE OF THINGS AT HOME, OR GET ALONG WITH OTHER PEOPLE: 2
2. FEELING DOWN, DEPRESSED OR HOPELESS: 2
3. TROUBLE FALLING OR STAYING ASLEEP: 3
8. MOVING OR SPEAKING SO SLOWLY THAT OTHER PEOPLE COULD HAVE NOTICED. OR THE OPPOSITE, BEING SO FIGETY OR RESTLESS THAT YOU HAVE BEEN MOVING AROUND A LOT MORE THAN USUAL: 2

## 2018-02-26 ASSESSMENT — ENCOUNTER SYMPTOMS
COLOR CHANGE: 0
WHEEZING: 0
SHORTNESS OF BREATH: 0
COUGH: 0
ABDOMINAL PAIN: 0

## 2018-02-26 NOTE — PROGRESS NOTES
[address] [facility] [address]  Dept: [phone]  Dept Fax: [phone]    Jeremiah Huang is a 40 y.o. female who presents today for her medical conditions/complaints as noted below. Jeremiah Huang is c/o of   Chief Complaint   Patient presents with    Dizziness     Patient here for follow up on dizziness and MRI results. HPI:     HPI    Patient here for follow up on the dizziness  She states at present sometimes having episodes up to 6 times a day  An episode may last from 10-30 seconds  She has not noticed anything that prompts the symptoms.    She is eating and drinking fluids well  Here to review recent MRI/MRA    Hemoglobin A1C (%)   Date Value   2014 4.9             ( goal A1C is < 7)   No results found for: LABMICR  No results found for: LDLCHOLESTEROL, LDLCALC    (goal LDL is <100)   AST (U/L)   Date Value   2017 16     ALT (U/L)   Date Value   2017 15     BUN (mg/dL)   Date Value   2018 13     BP Readings from Last 3 Encounters:   18 118/60   18 106/62   18 118/75          (goal 120/80)    Past Medical History:   Diagnosis Date    Anemia     [facility] at [facility] 2014    Compression fracture     Depression 2014    Enlarged uterus     Hypertension     Hypertension     Menorrhagia     Smoker     Vitamin D deficiency 10/28/2015    Wedge compression fracture of T11 vertebra [facility])       Past Surgical History:   Procedure Laterality Date     SECTION      [facility]   [facility]    GASTRIC BYPASS SURGERY      16    HEMORRHOID SURGERY N/A     HYSTEROSCOPY  14    endometrial ablaation    TOENAIL EXCISION Bilateral 2016    TONSILLECTOMY  age 3    TUBAL LIGATION      [facility]       Family History   Problem Relation Age of Onset    Diabetes Mother    Keila Davis  Nsaids     Other      Plastic Tape    Pcn [Penicillins]        Health Maintenance   Topic Date Due    HIV screen  09/05/1996    Flu vaccine (1) 01/01/2019 (Originally 9/1/2017)    Pneumococcal med risk (1 of 1 - PPSV23) 01/19/2019 (Originally 9/5/2000)    Cervical cancer screen  09/19/2019    DTaP/Tdap/Td vaccine (2 - Td) 10/19/2027       Subjective:      Review of Systems   Constitutional: Negative for activity change, appetite change, fatigue, fever and unexpected weight change. /60   Pulse 61   Temp 98.5 °F (36.9 °C) (Oral)   Ht 5' 8\" (1.727 m)   Wt 175 lb 9.6 oz (79.7 kg)   SpO2 96%   BMI 26.70 kg/m²    HENT: Negative for congestion, ear discharge and ear pain. Eyes: Negative for visual disturbance. Respiratory: Negative for cough, shortness of breath and wheezing. Cardiovascular: Negative for chest pain and palpitations. Gastrointestinal: Negative for abdominal pain. Skin: Negative for color change, pallor, rash and wound. Neurological: Positive for dizziness. Negative for facial asymmetry, weakness, light-headedness and headaches. Hematological: Negative for adenopathy. Psychiatric/Behavioral: Negative for sleep disturbance. The patient is not nervous/anxious. Objective:     Physical Exam   Constitutional: She is oriented to person, place, and time. She appears well-developed and well-nourished. HENT:   Head: Normocephalic and atraumatic. Eyes: Pupils are equal, round, and reactive to light. Neurological: She is alert and oriented to person, place, and time. No cranial nerve deficit (normal gait). Coordination normal.   Skin: Skin is warm and dry. No rash noted. No erythema. No pallor. Psychiatric: She has a normal mood and affect. Her behavior is normal. Judgment and thought content normal.   Nursing note and vitals reviewed.     /60   Pulse 61   Temp 98.5 °F (36.9 °C) (Oral)   Ht 5' 8\" (1.727 m)   Wt 175 lb 9.6 oz (79.7 kg)   SpO2 96% TISSUES: The bone marrow signal intensity appears normal. The soft   tissues demonstrate no acute abnormality.  There is minimal cerebellar   tonsillar ectopia.       MRA HEAD:       ANTERIOR CIRCULATION: The internal carotid arteries are normal in course and   caliber without focal stenosis. The anterior cerebral and middle cerebral   arteries demonstrate no focal stenosis.       POSTERIOR CIRCULATION: The posterior cerebral arteries demonstrate no focal   stenosis. The vertebral and basilar arteries appear unremarkable.       No intracranial aneurysm identified.           Impression   1. Minimal cerebellar tonsillar ectopia. 2. Otherwise, unremarkable noncontrast MRI of the brain. 3. Unremarkable MRA of the head.           Orders Placed This Encounter   Medications    meclizine (ANTIVERT) 25 MG tablet     Sig: Take 1 tablet by mouth 3 times daily as needed for Dizziness     Dispense:  30 tablet     Refill:  0       Patient given educational materials - see patient instructions. Discussed use, benefit, and side effects of prescribed medications. All patient questions answered. Pt voiced understanding. Reviewed health maintenance. Instructed to continue current medications, diet and exercise. Patient agreed with treatment plan. Follow up as directed.      Electronically signed by Harlan Castillo PA-C on 2/26/2018 at 10:22 AM

## 2018-03-02 ENCOUNTER — TELEPHONE (OUTPATIENT)
Dept: FAMILY MEDICINE CLINIC | Age: 38
End: 2018-03-02

## 2018-03-06 ENCOUNTER — TELEPHONE (OUTPATIENT)
Dept: FAMILY MEDICINE CLINIC | Age: 38
End: 2018-03-06

## 2018-03-19 ENCOUNTER — HOSPITAL ENCOUNTER (EMERGENCY)
Age: 38
Discharge: HOME | End: 2018-03-19
Attending: EMERGENCY MEDICINE
Payer: MEDICARE

## 2018-03-19 ENCOUNTER — TELEPHONE (OUTPATIENT)
Dept: FAMILY MEDICINE CLINIC | Age: 38
End: 2018-03-19

## 2018-03-19 VITALS
WEIGHT: 165 LBS | HEART RATE: 67 BPM | RESPIRATION RATE: 15 BRPM | SYSTOLIC BLOOD PRESSURE: 130 MMHG | TEMPERATURE: 97.3 F | BODY MASS INDEX: 25.09 KG/M2 | OXYGEN SATURATION: 100 % | DIASTOLIC BLOOD PRESSURE: 71 MMHG

## 2018-03-19 DIAGNOSIS — R10.9 FLANK PAIN: ICD-10-CM

## 2018-03-19 DIAGNOSIS — R35.0 URINARY FREQUENCY: Primary | ICD-10-CM

## 2018-03-19 LAB
-: ABNORMAL
ALBUMIN SERPL-MCNC: 3.8 G/DL (ref 3.5–5.2)
ALBUMIN/GLOBULIN RATIO: 1.7 (ref 1–2.5)
ALP BLD-CCNC: 49 U/L (ref 35–104)
ALT SERPL-CCNC: 14 U/L (ref 5–33)
AMORPHOUS: ABNORMAL
ANION GAP SERPL CALCULATED.3IONS-SCNC: 9 MMOL/L (ref 9–17)
AST SERPL-CCNC: 17 U/L
BACTERIA: ABNORMAL
BILIRUB SERPL-MCNC: 0.27 MG/DL (ref 0.3–1.2)
BILIRUBIN DIRECT: 0.1 MG/DL
BILIRUBIN URINE: NEGATIVE
BILIRUBIN, INDIRECT: 0.17 MG/DL (ref 0–1)
BUN BLDV-MCNC: 11 MG/DL (ref 6–20)
BUN/CREAT BLD: ABNORMAL (ref 9–20)
CALCIUM SERPL-MCNC: 9.1 MG/DL (ref 8.6–10.4)
CASTS UA: ABNORMAL /LPF (ref 0–2)
CHLORIDE BLD-SCNC: 102 MMOL/L (ref 98–107)
CO2: 26 MMOL/L (ref 20–31)
COLOR: YELLOW
COMMENT UA: ABNORMAL
CREAT SERPL-MCNC: 0.5 MG/DL (ref 0.5–0.9)
CRYSTALS, UA: ABNORMAL /HPF
EPITHELIAL CELLS UA: ABNORMAL /HPF (ref 0–5)
GFR AFRICAN AMERICAN: >60 ML/MIN
GFR NON-AFRICAN AMERICAN: >60 ML/MIN
GFR SERPL CREATININE-BSD FRML MDRD: ABNORMAL ML/MIN/{1.73_M2}
GFR SERPL CREATININE-BSD FRML MDRD: ABNORMAL ML/MIN/{1.73_M2}
GLOBULIN: ABNORMAL G/DL (ref 1.5–3.8)
GLUCOSE BLD-MCNC: 78 MG/DL (ref 70–99)
GLUCOSE URINE: NEGATIVE
HCG QUALITATIVE: NEGATIVE
HCT VFR BLD CALC: 37.4 % (ref 36.3–47.1)
HEMOGLOBIN: 12.4 G/DL (ref 11.9–15.1)
KETONES, URINE: ABNORMAL
LEUKOCYTE ESTERASE, URINE: ABNORMAL
LIPASE: 19 U/L (ref 13–60)
MCH RBC QN AUTO: 32.2 PG (ref 25.2–33.5)
MCHC RBC AUTO-ENTMCNC: 33.2 G/DL (ref 28.4–34.8)
MCV RBC AUTO: 97.1 FL (ref 82.6–102.9)
MUCUS: ABNORMAL
NITRITE, URINE: NEGATIVE
NRBC AUTOMATED: 0 PER 100 WBC
OTHER OBSERVATIONS UA: ABNORMAL
PDW BLD-RTO: 12.8 % (ref 11.8–14.4)
PH UA: 5 (ref 5–8)
PLATELET # BLD: 249 K/UL (ref 138–453)
PMV BLD AUTO: 10.6 FL (ref 8.1–13.5)
POTASSIUM SERPL-SCNC: 3.5 MMOL/L (ref 3.7–5.3)
PROTEIN UA: NEGATIVE
RBC # BLD: 3.85 M/UL (ref 3.95–5.11)
RBC UA: ABNORMAL /HPF (ref 0–2)
RENAL EPITHELIAL, UA: ABNORMAL /HPF
SODIUM BLD-SCNC: 137 MMOL/L (ref 135–144)
SPECIFIC GRAVITY UA: 1.03 (ref 1–1.03)
TOTAL PROTEIN: 6.1 G/DL (ref 6.4–8.3)
TRICHOMONAS: ABNORMAL
TURBIDITY: ABNORMAL
URINE HGB: NEGATIVE
UROBILINOGEN, URINE: NORMAL
WBC # BLD: 7.2 K/UL (ref 3.5–11.3)
WBC UA: ABNORMAL /HPF (ref 0–5)
YEAST: ABNORMAL

## 2018-03-19 PROCEDURE — 87086 URINE CULTURE/COLONY COUNT: CPT

## 2018-03-19 PROCEDURE — 99284 EMERGENCY DEPT VISIT MOD MDM: CPT

## 2018-03-19 PROCEDURE — 81001 URINALYSIS AUTO W/SCOPE: CPT

## 2018-03-19 PROCEDURE — 96374 THER/PROPH/DIAG INJ IV PUSH: CPT

## 2018-03-19 PROCEDURE — 80076 HEPATIC FUNCTION PANEL: CPT

## 2018-03-19 PROCEDURE — 84703 CHORIONIC GONADOTROPIN ASSAY: CPT

## 2018-03-19 PROCEDURE — 80048 BASIC METABOLIC PNL TOTAL CA: CPT

## 2018-03-19 PROCEDURE — 6360000002 HC RX W HCPCS: Performed by: PHYSICIAN ASSISTANT

## 2018-03-19 PROCEDURE — 96375 TX/PRO/DX INJ NEW DRUG ADDON: CPT

## 2018-03-19 PROCEDURE — 85027 COMPLETE CBC AUTOMATED: CPT

## 2018-03-19 PROCEDURE — 51798 US URINE CAPACITY MEASURE: CPT

## 2018-03-19 PROCEDURE — 2580000003 HC RX 258: Performed by: PHYSICIAN ASSISTANT

## 2018-03-19 PROCEDURE — 83690 ASSAY OF LIPASE: CPT

## 2018-03-19 RX ORDER — ACETAMINOPHEN 325 MG/1
650 TABLET ORAL EVERY 6 HOURS PRN
Qty: 30 TABLET | Refills: 0 | Status: SHIPPED | OUTPATIENT
Start: 2018-03-19 | End: 2018-11-15 | Stop reason: ALTCHOICE

## 2018-03-19 RX ORDER — MORPHINE SULFATE 4 MG/ML
4 INJECTION, SOLUTION INTRAMUSCULAR; INTRAVENOUS ONCE
Status: COMPLETED | OUTPATIENT
Start: 2018-03-19 | End: 2018-03-19

## 2018-03-19 RX ORDER — 0.9 % SODIUM CHLORIDE 0.9 %
1000 INTRAVENOUS SOLUTION INTRAVENOUS ONCE
Status: COMPLETED | OUTPATIENT
Start: 2018-03-19 | End: 2018-03-19

## 2018-03-19 RX ORDER — TAMSULOSIN HYDROCHLORIDE 0.4 MG/1
0.4 CAPSULE ORAL DAILY
Qty: 5 CAPSULE | Refills: 0 | Status: SHIPPED | OUTPATIENT
Start: 2018-03-19 | End: 2018-09-11 | Stop reason: ALTCHOICE

## 2018-03-19 RX ORDER — ONDANSETRON 2 MG/ML
4 INJECTION INTRAMUSCULAR; INTRAVENOUS ONCE
Status: COMPLETED | OUTPATIENT
Start: 2018-03-19 | End: 2018-03-19

## 2018-03-19 RX ADMIN — ONDANSETRON 4 MG: 2 INJECTION, SOLUTION INTRAMUSCULAR; INTRAVENOUS at 11:14

## 2018-03-19 RX ADMIN — SODIUM CHLORIDE 1000 ML: 9 INJECTION, SOLUTION INTRAVENOUS at 11:14

## 2018-03-19 RX ADMIN — MORPHINE SULFATE 4 MG: 4 INJECTION INTRAVENOUS at 11:14

## 2018-03-19 ASSESSMENT — PAIN DESCRIPTION - DESCRIPTORS: DESCRIPTORS: ACHING

## 2018-03-19 ASSESSMENT — ENCOUNTER SYMPTOMS
RHINORRHEA: 0
SORE THROAT: 0
WHEEZING: 0
COUGH: 0
ABDOMINAL PAIN: 1
COLOR CHANGE: 0
EYE PAIN: 0
VOMITING: 0
EYE ITCHING: 0
NAUSEA: 0
BACK PAIN: 0
EYE DISCHARGE: 0

## 2018-03-19 ASSESSMENT — PAIN DESCRIPTION - PAIN TYPE: TYPE: ACUTE PAIN

## 2018-03-19 ASSESSMENT — PAIN DESCRIPTION - ORIENTATION: ORIENTATION: LEFT

## 2018-03-19 ASSESSMENT — PAIN SCALES - GENERAL
PAINLEVEL_OUTOF10: 8
PAINLEVEL_OUTOF10: 10

## 2018-03-19 ASSESSMENT — PAIN DESCRIPTION - LOCATION: LOCATION: FLANK

## 2018-03-19 ASSESSMENT — PAIN DESCRIPTION - PROGRESSION: CLINICAL_PROGRESSION: GRADUALLY WORSENING

## 2018-03-19 ASSESSMENT — PAIN DESCRIPTION - ONSET: ONSET: SUDDEN

## 2018-03-19 NOTE — ED PROVIDER NOTES
[facility] ED  Emergency Department Encounter  Mid Level Provider     Pt Name: Jimbo Brush  MRN: [id number]  [place] 1980  Date of evaluation: 3/19/18  PCP:  Kyle Arevalo PA-C    CHIEF COMPLAINT       Chief Complaint   Patient presents with    Flank Pain     pt with c/o left flank pain and difficulty urinating since this morning. hx of kidney stones. c/o nausea.  Urinary Retention       HISTORY OF PRESENT ILLNESS  (Location/Symptom, Timing/Onset, Context/Setting, Quality, Duration, Modifying Factors, Severity.)      Jimbo Brush is a 40 y.o. female who presents with left flank pain and left upper abdominal pain that started this morning. Patient states that she has been having urinary frequency with very small amounts of output. She reports a previous history of renal calculi and urinary tract infection with no issue for the past 3-6 months. She does have dysuria as well. She denies any vaginal discharge. She has had nausea and several episodes of vomiting. No blood in the emesis. No fevers or chills. She is not taking anything for her pain. She is status post gastric bypass. PAST MEDICAL / SURGICAL / SOCIAL / FAMILY HISTORY      has a past medical history of Anemia; Anxiety; Compression fracture; Depression; Enlarged uterus; Hypertension; Hypertension; Menorrhagia; Smoker; Vitamin D deficiency; and Wedge compression fracture of T11 vertebra ([facility] [address] has a past surgical history that includes Tonsillectomy (age 4);  section (); Tubal ligation (); hysteroscopy (14); Endometrial ablation (14); Hemorrhoid surgery (N/A); toenail excision (Bilateral, 2016); and Gastric bypass surgery. Social History     Social History    Marital status:      Spouse name: N/A    Number of children: N/A    Years of education: N/A     Occupational History    Not on file.      Social History Main Topics    Smoking status: Current Every Day Nausea or Vomiting 12/11/17   Katie Tucker PA-C   famotidine (PEPCID) 20 MG tablet  11/1/17   Historical Provider, MD   cyanocobalamin 1000 MCG/ML injection INJECT 1 ML INTO THE APPROPRIATE MUSCLE Q 14 DAYS 11/1/17   Historical Provider, MD   Cholecalciferol (VITAMIN D3) 84047 units CAPS Take 50,000 Units by mouth 11/7/17   Historical Provider, MD SILVA 3CC LUER-VIKTORIYA SYR 25GX1/2\" 25G X 1-1/2\" 3 ML MISC U UTD Q 14 DAYS FOR 1 DOSE 11/1/17   Historical Provider, MD   Cyanocobalamin (VITAMIN B 12 PO) Take by mouth    Historical Provider, MD   BIOTIN PO Take 10,000 mcg by mouth daily    Historical Provider, MD       patient's medication list has been reviewed as entered by the nursing staff. REVIEW OF SYSTEMS    (2-9 systems for level 4, 10 or more for level 5)      Review of Systems   Constitutional: Negative for chills and fever. HENT: Negative for ear pain, rhinorrhea and sore throat. Eyes: Negative for pain, discharge and itching. Respiratory: Negative for cough and wheezing. Cardiovascular: Negative for chest pain and palpitations. Gastrointestinal: Positive for abdominal pain. Negative for nausea and vomiting. Genitourinary: Positive for dysuria, flank pain, frequency and urgency. Negative for difficulty urinating. Musculoskeletal: Negative for back pain and myalgias. Skin: Negative for color change and wound. Neurological: Negative for dizziness and headaches. Psychiatric/Behavioral: Negative for dysphoric mood. PHYSICAL EXAM   (up to 7 for level 4, 8 or more for level 5)      INITIAL VITALS:  weight is 165 lb (74.8 kg). Her oral temperature is 97.3 °F (36.3 °C). Her blood pressure is 130/71 and her pulse is 67. Her respiration is 15 and oxygen saturation is 100%. Physical Exam   Constitutional: She is oriented to person, place, and time. She appears well-developed and well-nourished. HENT:   Head: Normocephalic and atraumatic.    Right Ear: External ear normal.   Left Ear:

## 2018-03-20 LAB
CULTURE: NORMAL
CULTURE: NORMAL
Lab: NORMAL
SPECIMEN DESCRIPTION: NORMAL
STATUS: NORMAL

## 2018-05-18 ENCOUNTER — TELEPHONE (OUTPATIENT)
Dept: FAMILY MEDICINE CLINIC | Age: 38
End: 2018-05-18

## 2018-05-18 ENCOUNTER — HOSPITAL ENCOUNTER (EMERGENCY)
Age: 38
Discharge: HOME | End: 2018-05-18
Attending: EMERGENCY MEDICINE
Payer: MEDICARE

## 2018-05-18 VITALS
DIASTOLIC BLOOD PRESSURE: 66 MMHG | OXYGEN SATURATION: 100 % | SYSTOLIC BLOOD PRESSURE: 128 MMHG | HEIGHT: 70 IN | TEMPERATURE: 97.7 F | BODY MASS INDEX: 23.62 KG/M2 | WEIGHT: 165 LBS | RESPIRATION RATE: 14 BRPM | HEART RATE: 72 BPM

## 2018-05-18 DIAGNOSIS — R11.0 NAUSEA: Primary | ICD-10-CM

## 2018-05-18 LAB
-: ABNORMAL
AMORPHOUS: ABNORMAL
BACTERIA: ABNORMAL
BILIRUBIN URINE: ABNORMAL
CASTS UA: ABNORMAL /LPF
COLOR: YELLOW
COMMENT UA: ABNORMAL
CRYSTALS, UA: ABNORMAL /HPF
EPITHELIAL CELLS UA: ABNORMAL /HPF
GLUCOSE URINE: NEGATIVE
HCG(URINE) PREGNANCY TEST: NEGATIVE
KETONES, URINE: NEGATIVE
LEUKOCYTE ESTERASE, URINE: NEGATIVE
MUCUS: ABNORMAL
NITRITE, URINE: NEGATIVE
OTHER OBSERVATIONS UA: ABNORMAL
PH UA: 6 (ref 5–8)
PROTEIN UA: NEGATIVE
RBC UA: ABNORMAL /HPF
RENAL EPITHELIAL, UA: ABNORMAL /HPF
SPECIFIC GRAVITY UA: 1.02 (ref 1–1.03)
TRICHOMONAS: ABNORMAL
TURBIDITY: ABNORMAL
URINE HGB: ABNORMAL
UROBILINOGEN, URINE: NORMAL
WBC UA: ABNORMAL /HPF
YEAST: ABNORMAL

## 2018-05-18 PROCEDURE — 6360000002 HC RX W HCPCS: Performed by: PHYSICIAN ASSISTANT

## 2018-05-18 PROCEDURE — 84703 CHORIONIC GONADOTROPIN ASSAY: CPT

## 2018-05-18 PROCEDURE — 99283 EMERGENCY DEPT VISIT LOW MDM: CPT

## 2018-05-18 PROCEDURE — 81001 URINALYSIS AUTO W/SCOPE: CPT

## 2018-05-18 PROCEDURE — 87086 URINE CULTURE/COLONY COUNT: CPT

## 2018-05-18 RX ORDER — ONDANSETRON 4 MG/1
4 TABLET, ORALLY DISINTEGRATING ORAL EVERY 8 HOURS PRN
Qty: 20 TABLET | Refills: 0 | Status: SHIPPED | OUTPATIENT
Start: 2018-05-18 | End: 2018-11-02 | Stop reason: SDUPTHER

## 2018-05-18 RX ORDER — ONDANSETRON 4 MG/1
4 TABLET, ORALLY DISINTEGRATING ORAL ONCE
Status: COMPLETED | OUTPATIENT
Start: 2018-05-18 | End: 2018-05-18

## 2018-05-18 RX ADMIN — ONDANSETRON 4 MG: 4 TABLET, ORALLY DISINTEGRATING ORAL at 19:10

## 2018-05-19 LAB
CULTURE: NORMAL
CULTURE: NORMAL
Lab: NORMAL
SPECIMEN DESCRIPTION: NORMAL
SPECIMEN DESCRIPTION: NORMAL
STATUS: NORMAL

## 2018-05-21 ENCOUNTER — CARE COORDINATION (OUTPATIENT)
Dept: CARE COORDINATION | Age: 38
End: 2018-05-21

## 2018-06-16 ENCOUNTER — HOSPITAL ENCOUNTER (EMERGENCY)
Age: 38
Discharge: HOME | End: 2018-06-16
Attending: EMERGENCY MEDICINE
Payer: COMMERCIAL

## 2018-06-16 VITALS
OXYGEN SATURATION: 98 % | BODY MASS INDEX: 22.4 KG/M2 | SYSTOLIC BLOOD PRESSURE: 126 MMHG | RESPIRATION RATE: 16 BRPM | DIASTOLIC BLOOD PRESSURE: 67 MMHG | TEMPERATURE: 98.2 F | WEIGHT: 160 LBS | HEART RATE: 72 BPM | HEIGHT: 71 IN

## 2018-06-16 DIAGNOSIS — R51.9 ACUTE NONINTRACTABLE HEADACHE, UNSPECIFIED HEADACHE TYPE: Primary | ICD-10-CM

## 2018-06-16 PROCEDURE — 6370000000 HC RX 637 (ALT 250 FOR IP): Performed by: EMERGENCY MEDICINE

## 2018-06-16 PROCEDURE — 99283 EMERGENCY DEPT VISIT LOW MDM: CPT

## 2018-06-16 PROCEDURE — 96372 THER/PROPH/DIAG INJ SC/IM: CPT

## 2018-06-16 PROCEDURE — 6360000002 HC RX W HCPCS: Performed by: EMERGENCY MEDICINE

## 2018-06-16 RX ORDER — BUTALBITAL, ACETAMINOPHEN AND CAFFEINE 50; 325; 40 MG/1; MG/1; MG/1
1 TABLET ORAL ONCE
Status: COMPLETED | OUTPATIENT
Start: 2018-06-16 | End: 2018-06-16

## 2018-06-16 RX ORDER — DIPHENHYDRAMINE HYDROCHLORIDE 50 MG/ML
25 INJECTION INTRAMUSCULAR; INTRAVENOUS ONCE
Status: COMPLETED | OUTPATIENT
Start: 2018-06-16 | End: 2018-06-16

## 2018-06-16 RX ADMIN — BUTALBITAL, ACETAMINOPHEN AND CAFFEINE 1 TABLET: 50; 325; 40 TABLET ORAL at 05:47

## 2018-06-16 RX ADMIN — PROCHLORPERAZINE EDISYLATE 10 MG: 5 INJECTION INTRAMUSCULAR; INTRAVENOUS at 04:52

## 2018-06-16 RX ADMIN — DIPHENHYDRAMINE HYDROCHLORIDE 25 MG: 50 INJECTION, SOLUTION INTRAMUSCULAR; INTRAVENOUS at 04:52

## 2018-06-16 ASSESSMENT — PAIN DESCRIPTION - PAIN TYPE
TYPE: ACUTE PAIN

## 2018-06-16 ASSESSMENT — ENCOUNTER SYMPTOMS
DIARRHEA: 0
BLOOD IN STOOL: 0
CONSTIPATION: 0
TROUBLE SWALLOWING: 0
SORE THROAT: 0
EYE PAIN: 0
SINUS PRESSURE: 0
RHINORRHEA: 0
EYE REDNESS: 0
SHORTNESS OF BREATH: 0
CHEST TIGHTNESS: 0
COUGH: 0
WHEEZING: 0
VOMITING: 1
FACIAL SWELLING: 0
ABDOMINAL PAIN: 0
COLOR CHANGE: 0
NAUSEA: 1
EYE DISCHARGE: 0
BACK PAIN: 0

## 2018-06-16 ASSESSMENT — PAIN DESCRIPTION - LOCATION
LOCATION: EYE;HEAD
LOCATION: EYE;HEAD

## 2018-06-16 ASSESSMENT — PAIN DESCRIPTION - PROGRESSION: CLINICAL_PROGRESSION: GRADUALLY IMPROVING

## 2018-06-16 ASSESSMENT — PAIN SCALES - GENERAL
PAINLEVEL_OUTOF10: 9
PAINLEVEL_OUTOF10: 7
PAINLEVEL_OUTOF10: 7
PAINLEVEL_OUTOF10: 6

## 2018-06-16 ASSESSMENT — PAIN DESCRIPTION - ORIENTATION
ORIENTATION: LEFT
ORIENTATION: LEFT

## 2018-06-16 ASSESSMENT — PAIN DESCRIPTION - FREQUENCY: FREQUENCY: CONTINUOUS

## 2018-07-20 ENCOUNTER — HOSPITAL ENCOUNTER (EMERGENCY)
Age: 38
Discharge: HOME | End: 2018-07-20
Attending: EMERGENCY MEDICINE
Payer: COMMERCIAL

## 2018-07-20 VITALS
DIASTOLIC BLOOD PRESSURE: 73 MMHG | TEMPERATURE: 98.3 F | SYSTOLIC BLOOD PRESSURE: 125 MMHG | OXYGEN SATURATION: 99 % | HEART RATE: 78 BPM | RESPIRATION RATE: 16 BRPM

## 2018-07-20 DIAGNOSIS — R10.9 FLANK PAIN: Primary | ICD-10-CM

## 2018-07-20 LAB
-: ABNORMAL
ABSOLUTE EOS #: 0.15 K/UL (ref 0–0.44)
ABSOLUTE IMMATURE GRANULOCYTE: <0.03 K/UL (ref 0–0.3)
ABSOLUTE LYMPH #: 1.97 K/UL (ref 1.1–3.7)
ABSOLUTE MONO #: 0.43 K/UL (ref 0.1–1.2)
ALBUMIN SERPL-MCNC: 3.9 G/DL (ref 3.5–5.2)
ALBUMIN/GLOBULIN RATIO: 1.8 (ref 1–2.5)
ALP BLD-CCNC: 47 U/L (ref 35–104)
ALT SERPL-CCNC: 18 U/L (ref 5–33)
AMORPHOUS: ABNORMAL
ANION GAP SERPL CALCULATED.3IONS-SCNC: 10 MMOL/L (ref 9–17)
AST SERPL-CCNC: 19 U/L
BACTERIA: ABNORMAL
BASOPHILS # BLD: 1 % (ref 0–2)
BASOPHILS ABSOLUTE: 0.03 K/UL (ref 0–0.2)
BILIRUB SERPL-MCNC: 0.28 MG/DL (ref 0.3–1.2)
BILIRUBIN DIRECT: 0.09 MG/DL
BILIRUBIN URINE: NEGATIVE
BILIRUBIN, INDIRECT: 0.19 MG/DL (ref 0–1)
BUN BLDV-MCNC: 15 MG/DL (ref 6–20)
BUN/CREAT BLD: NORMAL (ref 9–20)
CALCIUM SERPL-MCNC: 8.6 MG/DL (ref 8.6–10.4)
CASTS UA: ABNORMAL /LPF (ref 0–2)
CHLORIDE BLD-SCNC: 106 MMOL/L (ref 98–107)
CO2: 24 MMOL/L (ref 20–31)
COLOR: YELLOW
COMMENT UA: ABNORMAL
CREAT SERPL-MCNC: 0.53 MG/DL (ref 0.5–0.9)
CRYSTALS, UA: ABNORMAL /HPF
DIFFERENTIAL TYPE: NORMAL
EOSINOPHILS RELATIVE PERCENT: 2 % (ref 1–4)
EPITHELIAL CELLS UA: ABNORMAL /HPF (ref 0–5)
GFR AFRICAN AMERICAN: >60 ML/MIN
GFR NON-AFRICAN AMERICAN: >60 ML/MIN
GFR SERPL CREATININE-BSD FRML MDRD: NORMAL ML/MIN/{1.73_M2}
GFR SERPL CREATININE-BSD FRML MDRD: NORMAL ML/MIN/{1.73_M2}
GLOBULIN: ABNORMAL G/DL (ref 1.5–3.8)
GLUCOSE BLD-MCNC: 84 MG/DL (ref 70–99)
GLUCOSE URINE: NEGATIVE
HCG QUALITATIVE: NEGATIVE
HCT VFR BLD CALC: 38.5 % (ref 36.3–47.1)
HEMOGLOBIN: 12.5 G/DL (ref 11.9–15.1)
IMMATURE GRANULOCYTES: 0 %
KETONES, URINE: NEGATIVE
LEUKOCYTE ESTERASE, URINE: NEGATIVE
LIPASE: 15 U/L (ref 13–60)
LYMPHOCYTES # BLD: 30 % (ref 24–43)
MCH RBC QN AUTO: 31.6 PG (ref 25.2–33.5)
MCHC RBC AUTO-ENTMCNC: 32.5 G/DL (ref 28.4–34.8)
MCV RBC AUTO: 97.5 FL (ref 82.6–102.9)
MONOCYTES # BLD: 7 % (ref 3–12)
MUCUS: ABNORMAL
NITRITE, URINE: NEGATIVE
NRBC AUTOMATED: 0 PER 100 WBC
OTHER OBSERVATIONS UA: ABNORMAL
PDW BLD-RTO: 12.9 % (ref 11.8–14.4)
PH UA: 5 (ref 5–8)
PLATELET # BLD: 223 K/UL (ref 138–453)
PLATELET ESTIMATE: NORMAL
PMV BLD AUTO: 10.7 FL (ref 8.1–13.5)
POTASSIUM SERPL-SCNC: 4 MMOL/L (ref 3.7–5.3)
PROTEIN UA: NEGATIVE
RBC # BLD: 3.95 M/UL (ref 3.95–5.11)
RBC # BLD: NORMAL 10*6/UL
RBC UA: ABNORMAL /HPF (ref 0–2)
RENAL EPITHELIAL, UA: ABNORMAL /HPF
SEG NEUTROPHILS: 60 % (ref 36–65)
SEGMENTED NEUTROPHILS ABSOLUTE COUNT: 3.91 K/UL (ref 1.5–8.1)
SODIUM BLD-SCNC: 140 MMOL/L (ref 135–144)
SPECIFIC GRAVITY UA: 1.03 (ref 1–1.03)
TOTAL PROTEIN: 6.1 G/DL (ref 6.4–8.3)
TRICHOMONAS: ABNORMAL
TURBIDITY: ABNORMAL
URINE HGB: NEGATIVE
UROBILINOGEN, URINE: NORMAL
WBC # BLD: 6.5 K/UL (ref 3.5–11.3)
WBC # BLD: NORMAL 10*3/UL
WBC UA: ABNORMAL /HPF (ref 0–5)
YEAST: ABNORMAL

## 2018-07-20 PROCEDURE — 96375 TX/PRO/DX INJ NEW DRUG ADDON: CPT

## 2018-07-20 PROCEDURE — 87086 URINE CULTURE/COLONY COUNT: CPT

## 2018-07-20 PROCEDURE — 2580000003 HC RX 258: Performed by: EMERGENCY MEDICINE

## 2018-07-20 PROCEDURE — 80076 HEPATIC FUNCTION PANEL: CPT

## 2018-07-20 PROCEDURE — 99284 EMERGENCY DEPT VISIT MOD MDM: CPT

## 2018-07-20 PROCEDURE — 83690 ASSAY OF LIPASE: CPT

## 2018-07-20 PROCEDURE — 96374 THER/PROPH/DIAG INJ IV PUSH: CPT

## 2018-07-20 PROCEDURE — 6360000002 HC RX W HCPCS: Performed by: EMERGENCY MEDICINE

## 2018-07-20 PROCEDURE — 81001 URINALYSIS AUTO W/SCOPE: CPT

## 2018-07-20 PROCEDURE — 85025 COMPLETE CBC W/AUTO DIFF WBC: CPT

## 2018-07-20 PROCEDURE — 84703 CHORIONIC GONADOTROPIN ASSAY: CPT

## 2018-07-20 PROCEDURE — 80048 BASIC METABOLIC PNL TOTAL CA: CPT

## 2018-07-20 RX ORDER — PROMETHAZINE HYDROCHLORIDE 25 MG/ML
12.5 INJECTION, SOLUTION INTRAMUSCULAR; INTRAVENOUS ONCE
Status: COMPLETED | OUTPATIENT
Start: 2018-07-20 | End: 2018-07-20

## 2018-07-20 RX ORDER — MORPHINE SULFATE 4 MG/ML
4 INJECTION, SOLUTION INTRAMUSCULAR; INTRAVENOUS ONCE
Status: COMPLETED | OUTPATIENT
Start: 2018-07-20 | End: 2018-07-20

## 2018-07-20 RX ORDER — 0.9 % SODIUM CHLORIDE 0.9 %
1000 INTRAVENOUS SOLUTION INTRAVENOUS ONCE
Status: COMPLETED | OUTPATIENT
Start: 2018-07-20 | End: 2018-07-20

## 2018-07-20 RX ADMIN — MORPHINE SULFATE 4 MG: 4 INJECTION INTRAVENOUS at 06:29

## 2018-07-20 RX ADMIN — SODIUM CHLORIDE 1000 ML: 9 INJECTION, SOLUTION INTRAVENOUS at 06:28

## 2018-07-20 RX ADMIN — PROMETHAZINE HYDROCHLORIDE 12.5 MG: 25 INJECTION INTRAMUSCULAR; INTRAVENOUS at 06:29

## 2018-07-20 ASSESSMENT — ENCOUNTER SYMPTOMS
EYE PAIN: 0
VOMITING: 0
SHORTNESS OF BREATH: 0
SORE THROAT: 0
NAUSEA: 0
DIARRHEA: 0
EYE DISCHARGE: 0
COUGH: 0
ABDOMINAL PAIN: 1

## 2018-07-20 ASSESSMENT — PAIN DESCRIPTION - FREQUENCY: FREQUENCY: CONTINUOUS

## 2018-07-20 ASSESSMENT — PAIN DESCRIPTION - LOCATION: LOCATION: FLANK

## 2018-07-20 ASSESSMENT — PAIN DESCRIPTION - DESCRIPTORS: DESCRIPTORS: SHARP

## 2018-07-20 ASSESSMENT — PAIN DESCRIPTION - ONSET: ONSET: SUDDEN

## 2018-07-20 ASSESSMENT — PAIN SCALES - GENERAL
PAINLEVEL_OUTOF10: 7
PAINLEVEL_OUTOF10: 7

## 2018-07-20 ASSESSMENT — PAIN DESCRIPTION - ORIENTATION: ORIENTATION: RIGHT

## 2018-07-20 NOTE — ED NOTES
Pt given warm blanket and updated on POC.  Denies needs at this time     [facility]  07/20/18 1032

## 2018-07-20 NOTE — ED PROVIDER NOTES
Comment: stopped 2016    Alcohol use Yes      Comment: socially    Drug use: No    Sexual activity: Yes     Partners: Male     Birth control/ protection: Surgical      Comment: Tubal Ligation     Other Topics Concern    Not on file     Social History Narrative    No narrative on file       Family History   Problem Relation Age of Onset    Diabetes Mother     High Blood Pressure Mother     Heart Disease Father         stents placed    High Blood Pressure Father     Hypotension Sister     Breast Cancer Maternal Grandmother     Cervical Cancer Maternal Grandmother     Other Maternal Grandfather         Drowning accident    Breast Cancer Paternal Grandmother     Heart Disease Paternal Grandfather     Cancer Neg Hx     Colon Cancer Neg Hx     Eclampsia Neg Hx     Hypertension Neg Hx     Ovarian Cancer Neg Hx      Labor Neg Hx     Spont Abortions Neg Hx     Stroke Neg Hx        Allergies:  Keflex [cephalexin]; Nsaids; Other; and Pcn [penicillins]    Home Medications:  Prior to Admission medications    Medication Sig Start Date End Date Taking?  Authorizing Provider   ondansetron (ZOFRAN ODT) 4 MG disintegrating tablet Take 1 tablet by mouth every 8 hours as needed for Nausea 18   DONNA Lucia   tamsulosin (FLOMAX) 0.4 MG capsule Take 1 capsule by mouth daily for 5 doses Take after meal 3/19/18 3/24/18  Ben Morris PA-C   acetaminophen (TYLENOL) 325 MG tablet Take 2 tablets by mouth every 6 hours as needed for Pain 3/19/18   Ben Morris PA-C   Pediatric Multivitamins-Iron (MULTIVITAMINS PLUS IRON CHILD) 18 MG CHEW Take 1 tablet by mouth 2 times daily 18   Katie Tucker PA-C   cyanocobalamin 1000 MCG/ML injection INJECT 1 ML INTO THE APPROPRIATE MUSCLE Q 14 DAYS 17   Historical Provider, MD   Cholecalciferol (VITAMIN D3) 18105 units CAPS Take 50,000 Units by mouth 17   Historical Provider, MD SILVA 3CC LUER-VIKTORIYA SYR 25GX1/2\" 25G X 1-1/2\" 3 ML MISC U UTD Q defined criteria.     CBC WITH AUTO DIFFERENTIAL   Result Value Ref Range    WBC 6.5 3.5 - 11.3 k/uL    RBC 3.95 3.95 - 5.11 m/uL    Hemoglobin 12.5 11.9 - 15.1 g/dL    Hematocrit 38.5 36.3 - 47.1 %    MCV 97.5 82.6 - 102.9 fL    MCH 31.6 25.2 - 33.5 pg    MCHC 32.5 28.4 - 34.8 g/dL    RDW 12.9 11.8 - 14.4 %    Platelets 270 767 - 707 k/uL    MPV 10.7 8.1 - 13.5 fL    NRBC Automated 0.0 0.0 per 100 WBC    Differential Type NOT REPORTED     Seg Neutrophils 60 36 - 65 %    Lymphocytes 30 24 - 43 %    Monocytes 7 3 - 12 %    Eosinophils % 2 1 - 4 %    Basophils 1 0 - 2 %    Immature Granulocytes 0 0 %    Segs Absolute 3.91 1.50 - 8.10 k/uL    Absolute Lymph # 1.97 1.10 - 3.70 k/uL    Absolute Mono # 0.43 0.10 - 1.20 k/uL    Absolute Eos # 0.15 0.00 - 0.44 k/uL    Basophils # 0.03 0.00 - 0.20 k/uL    Absolute Immature Granulocyte <0.03 0.00 - 0.30 k/uL    WBC Morphology NOT REPORTED     RBC Morphology NOT REPORTED     Platelet Estimate NOT REPORTED    BASIC METABOLIC PANEL   Result Value Ref Range    Glucose 84 70 - 99 mg/dL    BUN 15 6 - 20 mg/dL    CREATININE 0.53 0.50 - 0.90 mg/dL    Bun/Cre Ratio NOT REPORTED 9 - 20    Calcium 8.6 8.6 - 10.4 mg/dL    Sodium 140 135 - 144 mmol/L    Potassium 4.0 3.7 - 5.3 mmol/L    Chloride 106 98 - 107 mmol/L    CO2 24 20 - 31 mmol/L    Anion Gap 10 9 - 17 mmol/L    GFR Non-African American >60 >60 mL/min    GFR African American >60 >60 mL/min    GFR Comment          GFR Staging NOT REPORTED    HEPATIC FUNCTION PANEL   Result Value Ref Range    Alb 3.9 3.5 - 5.2 g/dL    Alkaline Phosphatase 47 35 - 104 U/L    ALT 18 5 - 33 U/L    AST 19 <32 U/L    Total Bilirubin 0.28 (L) 0.3 - 1.2 mg/dL    Bilirubin, Direct 0.09 <0.31 mg/dL    Bilirubin, Indirect 0.19 0.00 - 1.00 mg/dL    Total Protein 6.1 (L) 6.4 - 8.3 g/dL    Globulin NOT REPORTED 1.5 - 3.8 g/dL    Albumin/Globulin Ratio 1.8 1.0 - 2.5   LIPASE   Result Value Ref Range    Lipase 15 13 - 60 U/L   Microscopic Urinalysis   Result Value Ref Range    -          WBC, UA 2 TO 5 0 - 5 /HPF    RBC, UA 2 TO 5 0 - 2 /HPF    Casts UA NOT REPORTED 0 - 2 /LPF    Crystals UA NOT REPORTED NONE /HPF    Epithelial Cells UA 10 TO 20 0 - 5 /HPF    Renal Epithelial, Urine NOT REPORTED 0 /HPF    Bacteria, UA MODERATE (A) NONE    Mucus, UA 2+ (A) NONE    Trichomonas, UA NOT REPORTED NONE    Amorphous, UA NOT REPORTED NONE    Other Observations UA NOT REPORTED NREQ    Yeast, UA NOT REPORTED NONE   HCG Qualitative, Serum   Result Value Ref Range    hCG Qual NEGATIVE NEG       IMPRESSION: 39 yo female c/o R > L flank pain, tender RUQ, appears well, non-toxic, afebrile. Will check abd labs, urinalysis, treat symptoms and reassess. RADIOLOGY:  None    EKG  None    All EKG's are interpreted by the Emergency Department Physician who either signs or Co-signs this chart in the absence of a cardiologist.    EMERGENCY DEPARTMENT COURSE:  Following medications, pt notes pain almost resolved, no nausea, abd soft, minimally tender RUQ. Discussed option for RUQ US vs outpatient US, pt will follow up with PCP and pursue [address] outpatient. No e/o cholecystitis. Discussed discharge plan, follow up plan and return precautions, pt understands and agrees with plan    PROCEDURES:  None    CONSULTS:  None    CRITICAL CARE:  None    FINAL IMPRESSION      1. Flank pain          DISPOSITION / PLAN     DISPOSITION Decision To Discharge 07/20/2018 06:53:30 AM      PATIENT REFERRED TO:  Marvel Kawasaki, PA-C  [address]   [facility]  [phone]    Schedule an appointment as soon as possible for a visit in 3 days        DISCHARGE MEDICATIONS:  Discharge Medication List as of 7/20/2018  6:54 AM          Susan Joshi DO  Emergency Medicine Resident    (Please note that portions of this note were completed with a voice recognition program.  Efforts were made to edit the dictations but occasionally words are mis-transcribed.)       Susan Joshi DO  Resident  07/20/18 7083

## 2018-07-20 NOTE — ED NOTES
Bed: 07  Expected date:   Expected time:   Means of arrival:   Comments:     Edith Schwartz RN  07/20/18 9528

## 2018-07-21 LAB
CULTURE: NORMAL
Lab: NORMAL
SPECIMEN DESCRIPTION: NORMAL
STATUS: NORMAL

## 2018-08-09 ENCOUNTER — TELEPHONE (OUTPATIENT)
Dept: FAMILY MEDICINE CLINIC | Age: 38
End: 2018-08-09

## 2018-09-11 ENCOUNTER — OFFICE VISIT (OUTPATIENT)
Dept: FAMILY MEDICINE CLINIC | Age: 38
End: 2018-09-11
Payer: MEDICARE

## 2018-09-11 VITALS
SYSTOLIC BLOOD PRESSURE: 122 MMHG | DIASTOLIC BLOOD PRESSURE: 70 MMHG | HEIGHT: 69 IN | WEIGHT: 161.6 LBS | BODY MASS INDEX: 23.93 KG/M2

## 2018-09-11 DIAGNOSIS — F32.A DEPRESSION, UNSPECIFIED DEPRESSION TYPE: Primary | ICD-10-CM

## 2018-09-11 PROCEDURE — 4004F PT TOBACCO SCREEN RCVD TLK: CPT | Performed by: PHYSICIAN ASSISTANT

## 2018-09-11 PROCEDURE — G8420 CALC BMI NORM PARAMETERS: HCPCS | Performed by: PHYSICIAN ASSISTANT

## 2018-09-11 PROCEDURE — 99213 OFFICE O/P EST LOW 20 MIN: CPT | Performed by: PHYSICIAN ASSISTANT

## 2018-09-11 PROCEDURE — 96160 PT-FOCUSED HLTH RISK ASSMT: CPT | Performed by: PHYSICIAN ASSISTANT

## 2018-09-11 PROCEDURE — G8427 DOCREV CUR MEDS BY ELIG CLIN: HCPCS | Performed by: PHYSICIAN ASSISTANT

## 2018-09-11 RX ORDER — FLUOXETINE HYDROCHLORIDE 20 MG/1
20 CAPSULE ORAL DAILY
Qty: 30 CAPSULE | Refills: 3 | Status: SHIPPED | OUTPATIENT
Start: 2018-09-11 | End: 2018-11-02 | Stop reason: DRUGHIGH

## 2018-09-11 ASSESSMENT — ENCOUNTER SYMPTOMS
SHORTNESS OF BREATH: 0
CONSTIPATION: 0
NAUSEA: 0
WHEEZING: 0
ABDOMINAL PAIN: 0
VOMITING: 0
DIARRHEA: 0
COLOR CHANGE: 0
COUGH: 0

## 2018-09-11 ASSESSMENT — PATIENT HEALTH QUESTIONNAIRE - PHQ9
8. MOVING OR SPEAKING SO SLOWLY THAT OTHER PEOPLE COULD HAVE NOTICED. OR THE OPPOSITE, BEING SO FIGETY OR RESTLESS THAT YOU HAVE BEEN MOVING AROUND A LOT MORE THAN USUAL: 1
4. FEELING TIRED OR HAVING LITTLE ENERGY: 3
9. THOUGHTS THAT YOU WOULD BE BETTER OFF DEAD, OR OF HURTING YOURSELF: 0
3. TROUBLE FALLING OR STAYING ASLEEP: 3
7. TROUBLE CONCENTRATING ON THINGS, SUCH AS READING THE NEWSPAPER OR WATCHING TELEVISION: 3
SUM OF ALL RESPONSES TO PHQ9 QUESTIONS 1 & 2: 6
2. FEELING DOWN, DEPRESSED OR HOPELESS: 3
5. POOR APPETITE OR OVEREATING: 1
6. FEELING BAD ABOUT YOURSELF - OR THAT YOU ARE A FAILURE OR HAVE LET YOURSELF OR YOUR FAMILY DOWN: 3
SUM OF ALL RESPONSES TO PHQ QUESTIONS 1-9: 20
10. IF YOU CHECKED OFF ANY PROBLEMS, HOW DIFFICULT HAVE THESE PROBLEMS MADE IT FOR YOU TO DO YOUR WORK, TAKE CARE OF THINGS AT HOME, OR GET ALONG WITH OTHER PEOPLE: 2
SUM OF ALL RESPONSES TO PHQ QUESTIONS 1-9: 20
1. LITTLE INTEREST OR PLEASURE IN DOING THINGS: 3
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
## 2018-09-13 ENCOUNTER — TELEPHONE (OUTPATIENT)
Dept: FAMILY MEDICINE CLINIC | Age: 38
End: 2018-09-13

## 2018-10-09 ENCOUNTER — HOSPITAL ENCOUNTER (EMERGENCY)
Age: 38
Discharge: HOME | End: 2018-10-09
Attending: EMERGENCY MEDICINE
Payer: COMMERCIAL

## 2018-10-09 ENCOUNTER — APPOINTMENT (OUTPATIENT)
Dept: GENERAL RADIOLOGY | Age: 38
End: 2018-10-09
Payer: COMMERCIAL

## 2018-10-09 VITALS
TEMPERATURE: 98.5 F | HEIGHT: 70 IN | DIASTOLIC BLOOD PRESSURE: 61 MMHG | WEIGHT: 160 LBS | HEART RATE: 65 BPM | SYSTOLIC BLOOD PRESSURE: 113 MMHG | OXYGEN SATURATION: 99 % | RESPIRATION RATE: 12 BRPM | BODY MASS INDEX: 22.9 KG/M2

## 2018-10-09 DIAGNOSIS — J20.9 ACUTE BRONCHITIS, UNSPECIFIED ORGANISM: Primary | ICD-10-CM

## 2018-10-09 PROCEDURE — 6370000000 HC RX 637 (ALT 250 FOR IP): Performed by: STUDENT IN AN ORGANIZED HEALTH CARE EDUCATION/TRAINING PROGRAM

## 2018-10-09 PROCEDURE — 94640 AIRWAY INHALATION TREATMENT: CPT

## 2018-10-09 PROCEDURE — 94664 DEMO&/EVAL PT USE INHALER: CPT

## 2018-10-09 PROCEDURE — 71046 X-RAY EXAM CHEST 2 VIEWS: CPT

## 2018-10-09 PROCEDURE — 99283 EMERGENCY DEPT VISIT LOW MDM: CPT

## 2018-10-09 RX ORDER — IPRATROPIUM BROMIDE AND ALBUTEROL SULFATE 2.5; .5 MG/3ML; MG/3ML
1 SOLUTION RESPIRATORY (INHALATION)
Status: DISCONTINUED | OUTPATIENT
Start: 2018-10-09 | End: 2018-10-09 | Stop reason: HOSPADM

## 2018-10-09 RX ORDER — PSEUDOEPHEDRINE HYDROCHLORIDE 30 MG/1
60 TABLET ORAL ONCE
Status: COMPLETED | OUTPATIENT
Start: 2018-10-09 | End: 2018-10-09

## 2018-10-09 RX ORDER — ALBUTEROL SULFATE 90 UG/1
1-2 AEROSOL, METERED RESPIRATORY (INHALATION) EVERY 4 HOURS PRN
Qty: 1 INHALER | Refills: 0 | Status: SHIPPED | OUTPATIENT
Start: 2018-10-09

## 2018-10-09 RX ORDER — BENZONATATE 100 MG/1
100 CAPSULE ORAL 3 TIMES DAILY PRN
Qty: 30 CAPSULE | Refills: 0 | Status: SHIPPED | OUTPATIENT
Start: 2018-10-09 | End: 2018-10-16

## 2018-10-09 RX ADMIN — IPRATROPIUM BROMIDE AND ALBUTEROL SULFATE 1 AMPULE: .5; 3 SOLUTION RESPIRATORY (INHALATION) at 15:17

## 2018-10-09 RX ADMIN — PSEUDOEPHEDRINE HCL 60 MG: 30 TABLET, FILM COATED ORAL at 16:06

## 2018-10-09 ASSESSMENT — ENCOUNTER SYMPTOMS
CHEST TIGHTNESS: 0
WHEEZING: 0
VOMITING: 0
TROUBLE SWALLOWING: 0
SHORTNESS OF BREATH: 0
ABDOMINAL PAIN: 0
COUGH: 1
BACK PAIN: 0
NAUSEA: 0
PHOTOPHOBIA: 0
SORE THROAT: 0

## 2018-10-09 ASSESSMENT — PAIN DESCRIPTION - DESCRIPTORS: DESCRIPTORS: PRESSURE

## 2018-10-09 ASSESSMENT — PAIN DESCRIPTION - PAIN TYPE: TYPE: ACUTE PAIN

## 2018-10-09 ASSESSMENT — PAIN SCALES - GENERAL: PAINLEVEL_OUTOF10: 5

## 2018-10-09 ASSESSMENT — PAIN DESCRIPTION - LOCATION: LOCATION: CHEST

## 2018-10-09 ASSESSMENT — PAIN DESCRIPTION - FREQUENCY: FREQUENCY: CONTINUOUS

## 2018-10-09 NOTE — ED PROVIDER NOTES
Alcohol use Yes      Comment: socially    Drug use: No    Sexual activity: Yes     Partners: Male     Birth control/ protection: Surgical      Comment: Tubal Ligation     Other Topics Concern    Not on file     Social History Narrative    No narrative on file       Family History   Problem Relation Age of Onset    Diabetes Mother     High Blood Pressure Mother     Heart Disease Father         stents placed    High Blood Pressure Father     Hypotension Sister     Breast Cancer Maternal Grandmother     Cervical Cancer Maternal Grandmother     Other Maternal Grandfather         Drowning accident    Breast Cancer Paternal Grandmother     Heart Disease Paternal Grandfather     Cancer Neg Hx     Colon Cancer Neg Hx     Eclampsia Neg Hx     Hypertension Neg Hx     Ovarian Cancer Neg Hx      Labor Neg Hx     Spont Abortions Neg Hx     Stroke Neg Hx        Allergies:  Keflex [cephalexin]; Nsaids; Other; and Pcn [penicillins]    Home Medications:  Prior to Admission medications    Medication Sig Start Date End Date Taking? Authorizing Provider   albuterol sulfate HFA (PROVENTIL HFA) 108 (90 Base) MCG/ACT inhaler Inhale 1-2 puffs into the lungs every 4 hours as needed for Wheezing or Shortness of Breath (Space out to every 6 hours as symptoms improve) Space out to every 6 hours as symptoms improve.  10/9/18  Yes Lenora Frazier MD   benzonatate (TESSALON PERLES) 100 MG capsule Take 1 capsule by mouth 3 times daily as needed for Cough 10/9/18 10/16/18 Yes Lenora Frazier MD   Pseudoephedrine HCl 30 MG CAPS Take 1 tablet by mouth every 4-6 hours as needed (congestion) 10/9/18  Yes Tiana Shaffer MD   FLUoxetine (PROZAC) 20 MG capsule Take 1 capsule by mouth daily 18   Katie Tucker PA-C   ondansetron (ZOFRAN ODT) 4 MG disintegrating tablet Take 1 tablet by mouth every 8 hours as needed for Nausea 18   DONNA Lucia   acetaminophen (TYLENOL) 325 MG tablet Take 2 tablets by mouth every 6 hours as needed for Pain 3/19/18   Renetta Awad PA-C   Pediatric Multivitamins-Iron (MULTIVITAMINS PLUS IRON CHILD) 18 MG CHEW Take 1 tablet by mouth 2 times daily 1/17/18   Katie Tucker PA-C   cyanocobalamin 1000 MCG/ML injection INJECT 1 ML INTO THE APPROPRIATE MUSCLE Q 14 DAYS 11/1/17   Historical Provider, MD   Cholecalciferol (VITAMIN D3) 57377 units CAPS Take 50,000 Units by mouth 11/7/17   Historical Provider, MD SILVA 3CC LUER-VIKTORIYA SYR 25GX1/2\" 25G X 1-1/2\" 3 ML MISC U UTD Q 14 DAYS FOR 1 DOSE 11/1/17   Historical Provider, MD   BIOTIN PO Take 10,000 mcg by mouth daily    Historical Provider, MD       REVIEW OF SYSTEMS    (2-9 systems for level 4, 10 or more for level 5)      Review of Systems   Constitutional: Negative for chills and fever. HENT: Positive for congestion. Negative for sore throat and trouble swallowing. Eyes: Negative for photophobia. Respiratory: Positive for cough. Negative for chest tightness, shortness of breath and wheezing. Cardiovascular: Negative for chest pain and palpitations. Gastrointestinal: Negative for abdominal pain, nausea and vomiting. Endocrine: Negative for polyuria. Genitourinary: Negative for dysuria and flank pain. Musculoskeletal: Negative for back pain and neck pain. Skin: Negative for rash and wound. Neurological: Positive for headaches. Negative for syncope, weakness and light-headedness. Psychiatric/Behavioral: Negative for agitation and confusion. PHYSICAL EXAM   (up to 7 for level 4, 8 or more for level 5)      INITIAL VITALS:   /61   Pulse 65   Temp 98.5 °F (36.9 °C) (Oral)   Resp 12   Ht 5' 9.5\" (1.765 m)   Wt 160 lb (72.6 kg)   LMP 10/09/2018 (Exact Date)   SpO2 99%   BMI 23.29 kg/m²     Physical Exam   Constitutional: She is oriented to person, place, and time. She appears well-developed and well-nourished. HENT:   Head: Normocephalic and atraumatic.    Nose: Nose normal.   Mouth/Throat: Oropharynx is clear and moist.   Mild congestion noted. Eyes: Pupils are equal, round, and reactive to light. EOM are normal.   Neck: Normal range of motion. Neck supple. Cardiovascular: Normal rate, regular rhythm, normal heart sounds and intact distal pulses. Pulmonary/Chest: Breath sounds normal. No respiratory distress. She has no wheezes. She has no rales. Abdominal: Soft. Bowel sounds are normal. She exhibits no distension. There is no tenderness. Musculoskeletal: Normal range of motion. She exhibits no edema or deformity. Neurological: She is alert and oriented to person, place, and time. She has normal reflexes. Skin: Skin is warm and dry. No rash noted. No erythema. Psychiatric: She has a normal mood and affect. Her behavior is normal.       DIFFERENTIAL  DIAGNOSIS     PLAN (LABS / IMAGING / EKG):  Orders Placed This Encounter   Procedures    XR CHEST STANDARD (2 VW)       MEDICATIONS ORDERED:  Orders Placed This Encounter   Medications    DISCONTD: ipratropium-albuterol (DUONEB) nebulizer solution 1 ampule    albuterol sulfate HFA (PROVENTIL HFA) 108 (90 Base) MCG/ACT inhaler     Sig: Inhale 1-2 puffs into the lungs every 4 hours as needed for Wheezing or Shortness of Breath (Space out to every 6 hours as symptoms improve) Space out to every 6 hours as symptoms improve.      Dispense:  1 Inhaler     Refill:  0    benzonatate (TESSALON PERLES) 100 MG capsule     Sig: Take 1 capsule by mouth 3 times daily as needed for Cough     Dispense:  30 capsule     Refill:  0    pseudoephedrine (SUDAFED) tablet 60 mg    Pseudoephedrine HCl 30 MG CAPS     Sig: Take 1 tablet by mouth every 4-6 hours as needed (congestion)     Dispense:  20 capsule     Refill:  0       DDX: Pneumonia, sinusitis, foreign body, URI, viral illness, asthma/ COPD, allergic rhinitis, GERD, ACE- inhibitor use, HIV (TB, PCP)    Evaluate for: history of asthma/ COPD, GERD, allergies, sputum production, hemoptysis,

## 2018-11-02 ENCOUNTER — OFFICE VISIT (OUTPATIENT)
Dept: FAMILY MEDICINE CLINIC | Age: 38
End: 2018-11-02
Payer: COMMERCIAL

## 2018-11-02 VITALS
SYSTOLIC BLOOD PRESSURE: 104 MMHG | BODY MASS INDEX: 24.59 KG/M2 | HEART RATE: 63 BPM | OXYGEN SATURATION: 100 % | TEMPERATURE: 97.3 F | WEIGHT: 166 LBS | DIASTOLIC BLOOD PRESSURE: 60 MMHG | HEIGHT: 69 IN

## 2018-11-02 DIAGNOSIS — F32.A DEPRESSION, UNSPECIFIED DEPRESSION TYPE: ICD-10-CM

## 2018-11-02 DIAGNOSIS — F41.9 ANXIETY: Primary | ICD-10-CM

## 2018-11-02 PROCEDURE — G8484 FLU IMMUNIZE NO ADMIN: HCPCS | Performed by: PHYSICIAN ASSISTANT

## 2018-11-02 PROCEDURE — G8420 CALC BMI NORM PARAMETERS: HCPCS | Performed by: PHYSICIAN ASSISTANT

## 2018-11-02 PROCEDURE — 4004F PT TOBACCO SCREEN RCVD TLK: CPT | Performed by: PHYSICIAN ASSISTANT

## 2018-11-02 PROCEDURE — G8427 DOCREV CUR MEDS BY ELIG CLIN: HCPCS | Performed by: PHYSICIAN ASSISTANT

## 2018-11-02 PROCEDURE — 99213 OFFICE O/P EST LOW 20 MIN: CPT | Performed by: PHYSICIAN ASSISTANT

## 2018-11-02 RX ORDER — HYDROXYZINE PAMOATE 25 MG/1
25 CAPSULE ORAL 3 TIMES DAILY PRN
Qty: 30 CAPSULE | Refills: 3 | Status: SHIPPED | OUTPATIENT
Start: 2018-11-02 | End: 2019-01-01 | Stop reason: SDUPTHER

## 2018-11-02 RX ORDER — FLUOXETINE 20 MG/1
30 TABLET, FILM COATED ORAL DAILY
Qty: 45 TABLET | Refills: 1 | Status: SHIPPED | OUTPATIENT
Start: 2018-11-02 | End: 2018-11-15 | Stop reason: DRUGHIGH

## 2018-11-02 RX ORDER — ONDANSETRON 4 MG/1
4 TABLET, ORALLY DISINTEGRATING ORAL EVERY 8 HOURS PRN
Qty: 20 TABLET | Refills: 0 | Status: SHIPPED | OUTPATIENT
Start: 2018-11-02 | End: 2018-11-15 | Stop reason: SDUPTHER

## 2018-11-02 ASSESSMENT — ENCOUNTER SYMPTOMS
WHEEZING: 0
DIARRHEA: 0
COUGH: 0
COLOR CHANGE: 0
CONSTIPATION: 0
NAUSEA: 0
ABDOMINAL PAIN: 0
SHORTNESS OF BREATH: 0
VOMITING: 0

## 2018-11-02 NOTE — PROGRESS NOTES
Surgical History:   Procedure Laterality Date     SECTION      [id number] ENDOMETRIAL ABLATION  14    [facility]    GASTRIC BYPASS SURGERY      16    HEMORRHOID SURGERY N/A     HYSTEROSCOPY  14    endometrial ablaation    TOENAIL EXCISION Bilateral 2016    TONSILLECTOMY  age 3    TUBAL LIGATION  2007    [address]       Family History   Problem Relation Age of Onset    Diabetes Mother     High Blood Pressure Mother     Heart Disease Father         stents placed    High Blood Pressure Father     Hypotension Sister     Breast Cancer Maternal Grandmother     Cervical Cancer Maternal Grandmother     Other Maternal Grandfather         Drowning accident    Breast Cancer Paternal Grandmother     Heart Disease Paternal Grandfather     Cancer Neg Hx     Colon Cancer Neg Hx     Eclampsia Neg Hx     Hypertension Neg Hx     Ovarian Cancer Neg Hx      Labor Neg Hx     Spont Abortions Neg Hx     Stroke Neg Hx        Social History   Substance Use Topics    Smoking status: Current Every Day Smoker     Packs/day: 1.00     Years: 15.00     Types: Cigarettes    Smokeless tobacco: Never Used      Comment: stopped 2016    Alcohol use Yes      Comment: socially      Current Outpatient Prescriptions   Medication Sig Dispense Refill    ondansetron (ZOFRAN ODT) 4 MG disintegrating tablet Take 1 tablet by mouth every 8 hours as needed for Nausea 20 tablet 0    FLUoxetine (PROZAC) 20 MG tablet Take 1.5 tablets by mouth daily 45 tablet 1    hydrOXYzine (VISTARIL) 25 MG capsule Take 1 capsule by mouth 3 times daily as needed for Anxiety 30 capsule 3    albuterol sulfate HFA (PROVENTIL HFA) 108 (90 Base) MCG/ACT inhaler Inhale 1-2 puffs into the lungs every 4 hours as needed for Wheezing or Shortness of Breath (Space out to every 6 hours as symptoms improve) Space out to every 6 hours as symptoms improve.  1 Inhaler 0    Pseudoephedrine HCl 30 MG CAPS Take 1

## 2018-11-14 ENCOUNTER — TELEPHONE (OUTPATIENT)
Dept: FAMILY MEDICINE CLINIC | Age: 38
End: 2018-11-14

## 2018-11-15 ENCOUNTER — TELEPHONE (OUTPATIENT)
Dept: FAMILY MEDICINE CLINIC | Age: 38
End: 2018-11-15

## 2018-11-15 ENCOUNTER — OFFICE VISIT (OUTPATIENT)
Dept: FAMILY MEDICINE CLINIC | Age: 38
End: 2018-11-15
Payer: COMMERCIAL

## 2018-11-15 VITALS
DIASTOLIC BLOOD PRESSURE: 70 MMHG | WEIGHT: 165.4 LBS | HEIGHT: 70 IN | BODY MASS INDEX: 23.68 KG/M2 | HEART RATE: 65 BPM | OXYGEN SATURATION: 98 % | SYSTOLIC BLOOD PRESSURE: 126 MMHG

## 2018-11-15 DIAGNOSIS — F19.10 SUBSTANCE ABUSE (HCC): Primary | ICD-10-CM

## 2018-11-15 DIAGNOSIS — R11.0 NAUSEA: ICD-10-CM

## 2018-11-15 DIAGNOSIS — F41.9 ANXIETY: ICD-10-CM

## 2018-11-15 PROCEDURE — 4004F PT TOBACCO SCREEN RCVD TLK: CPT | Performed by: PHYSICIAN ASSISTANT

## 2018-11-15 PROCEDURE — G8484 FLU IMMUNIZE NO ADMIN: HCPCS | Performed by: PHYSICIAN ASSISTANT

## 2018-11-15 PROCEDURE — G8427 DOCREV CUR MEDS BY ELIG CLIN: HCPCS | Performed by: PHYSICIAN ASSISTANT

## 2018-11-15 PROCEDURE — 99213 OFFICE O/P EST LOW 20 MIN: CPT | Performed by: PHYSICIAN ASSISTANT

## 2018-11-15 PROCEDURE — G8420 CALC BMI NORM PARAMETERS: HCPCS | Performed by: PHYSICIAN ASSISTANT

## 2018-11-15 RX ORDER — ONDANSETRON 4 MG/1
4 TABLET, ORALLY DISINTEGRATING ORAL EVERY 8 HOURS PRN
Qty: 30 TABLET | Refills: 1 | Status: SHIPPED | OUTPATIENT
Start: 2018-11-15 | End: 2019-01-01

## 2018-11-15 RX ORDER — BUPRENORPHINE AND NALOXONE 8; 2 MG/1; MG/1
1 FILM, SOLUBLE BUCCAL; SUBLINGUAL DAILY
COMMUNITY
End: 2019-01-01 | Stop reason: ALTCHOICE

## 2018-11-15 RX ORDER — FLUOXETINE HYDROCHLORIDE 40 MG/1
40 CAPSULE ORAL DAILY
Qty: 30 CAPSULE | Refills: 3 | Status: SHIPPED | OUTPATIENT
Start: 2018-11-15 | End: 2019-01-01 | Stop reason: SDUPTHER

## 2018-11-15 ASSESSMENT — ENCOUNTER SYMPTOMS
NAUSEA: 0
DIARRHEA: 0
SHORTNESS OF BREATH: 0
WHEEZING: 0
VOMITING: 0
CONSTIPATION: 0
COLOR CHANGE: 0
ABDOMINAL PAIN: 0
COUGH: 0

## 2018-11-15 NOTE — PROGRESS NOTES
Substance abuse ([facility] [address] 11/15/2018    Vitamin D deficiency 10/28/2015    Wedge compression fracture of T11 vertebra [facility])       Past Surgical History:   Procedure Laterality Date     SECTION      [id number] [facility]    GASTRIC BYPASS SURGERY      16    HEMORRHOID SURGERY N/A     HYSTEROSCOPY  14    endometrial ablaation    TOENAIL EXCISION Bilateral 2016    TONSILLECTOMY  age 3    TUBAL LIGATION      [facility]       Family History   Problem Relation Age of Onset    Diabetes Mother     High Blood Pressure Mother     Heart Disease Father         stents placed    High Blood Pressure Father     Hypotension Sister     Breast Cancer Maternal Grandmother     Cervical Cancer Maternal Grandmother     Other Maternal Grandfather         Drowning accident    Breast Cancer Paternal Grandmother     Heart Disease Paternal Grandfather     Cancer Neg Hx     Colon Cancer Neg Hx     Eclampsia Neg Hx     Hypertension Neg Hx     Ovarian Cancer Neg Hx      Labor Neg Hx     Spont Abortions Neg Hx     Stroke Neg Hx        Social History   Substance Use Topics    Smoking status: Current Every Day Smoker     Packs/day: 1.00     Years: 15.00     Types: Cigarettes    Smokeless tobacco: Never Used      Comment: stopped 2016    Alcohol use Yes      Comment: socially      Current Outpatient Prescriptions   Medication Sig Dispense Refill    buprenorphine-naloxone (SUBOXONE) 8-2 MG FILM SL film Place 1 Film under the tongue daily. .      ondansetron (ZOFRAN ODT) 4 MG disintegrating tablet Take 1 tablet by mouth every 8 hours as needed for Nausea 30 tablet 1    FLUoxetine (PROZAC) 40 MG capsule Take 1 capsule by mouth daily 30 capsule 3    hydrOXYzine (VISTARIL) 25 MG capsule Take 1 capsule by mouth 3 times daily as needed for Anxiety 30 capsule 3    albuterol sulfate HFA (PROVENTIL HFA) 108 (90 Base) MCG/ACT inhaler Inhale 1-2 puffs into the lungs every 4 hours as needed for Wheezing or Shortness of Breath (Space out to every 6 hours as symptoms improve) Space out to every 6 hours as symptoms improve. 1 Inhaler 0    Pediatric Multivitamins-Iron (MULTIVITAMINS PLUS IRON CHILD) 18 MG CHEW Take 1 tablet by mouth 2 times daily 180 tablet 3    cyanocobalamin 1000 MCG/ML injection INJECT 1 ML INTO THE APPROPRIATE MUSCLE Q 14 DAYS  11    Cholecalciferol (VITAMIN D3) 81511 units CAPS Take 50,000 Units by mouth      B-D 3CC LUER-VIKTORIYA SYR 25GX1/2\" 25G X 1-1/2\" 3 ML MISC U UTD Q 14 DAYS FOR 1 DOSE  11    BIOTIN PO Take 10,000 mcg by mouth daily       No current facility-administered medications for this visit. Allergies   Allergen Reactions    Keflex [Cephalexin]     Nsaids     Other      Plastic Tape    Pcn [Penicillins]        Health Maintenance   Topic Date Due    HIV screen  09/05/1996    Flu vaccine (1) 01/01/2019 (Originally 9/1/2018)    Pneumococcal med risk (1 of 1 - PPSV23) 01/19/2019 (Originally 9/5/2000)    Cervical cancer screen  09/19/2019    DTaP/Tdap/Td vaccine (2 - Td) 10/19/2027       Subjective:     Review of Systems   Constitutional: Negative for activity change, appetite change, fatigue, fever and unexpected weight change. /70   Pulse 65   Ht 5' 10\" (1.778 m)   Wt 165 lb 6.4 oz (75 kg)   SpO2 98%   BMI 23.73 kg/m²    Respiratory: Negative for cough, shortness of breath and wheezing. Cardiovascular: Negative for chest pain and palpitations. Gastrointestinal: Negative for abdominal pain, constipation, diarrhea, nausea and vomiting. Skin: Negative for color change, pallor, rash and wound. Hematological: Negative for adenopathy. Psychiatric/Behavioral: Negative for sleep disturbance. The patient is nervous/anxious. Objective:     Physical Exam   Constitutional: She is oriented to person, place, and time. She appears well-developed and well-nourished.    HENT:   Head: Normocephalic and atraumatic. Neurological: She is alert and oriented to person, place, and time. She displays no tremor. Skin: Skin is warm and dry. No rash noted. No erythema. No pallor. Psychiatric: She has a normal mood and affect. Her behavior is normal. Judgment and thought content normal.   Patient dressed well, good eye contact, actively participates in conversation   Nursing note and vitals reviewed. /70   Pulse 65   Ht 5' 10\" (1.778 m)   Wt 165 lb 6.4 oz (75 kg)   SpO2 98%   BMI 23.73 kg/m²     Assessment:       Diagnosis Orders   [facility]. [facility]     2. Anxiety  FLUoxetine (PROZAC) 40 MG capsule   3. Nausea  ondansetron (ZOFRAN ODT) 4 MG disintegrating tablet             Plan:      Return in about 3 months (around 2/15/2019) for med review. Patient doing better now with the suboxone  She has good support at the [facility] and at home  Will increase the prozac at this point. Patient would like to do this and is feeling better with recent adjustments  Refill zofran use cautiously  Patient agreed with treatment plan    Orders Placed This Encounter   Medications    ondansetron (ZOFRAN ODT) 4 MG disintegrating tablet     Sig: Take 1 tablet by mouth every 8 hours as needed for Nausea     Dispense:  30 tablet     Refill:  1    FLUoxetine (PROZAC) 40 MG capsule     Sig: Take 1 capsule by mouth daily     Dispense:  30 capsule     Refill:  3       Patient given educational materials - see patient instructions. Discussed use, benefit, and side effects of prescribed medications. All patientquestions answered. Pt voiced understanding. Reviewed health maintenance. Instructedto continue current medications, diet and exercise. Patient agreed with treatmentplan. Follow up as directed.      Electronically signed by Norma Ponce PA-C on 11/15/2018 at 10:47 AM

## 2018-11-15 NOTE — PROGRESS NOTES
Visit Information    Have you changed or started any medications since your last visit including any over-the-counter medicines, vitamins, or herbal medicines? yes - Suboxone  Have you stopped taking any of your medications? Is so, why? -  no  Are you having any side effects from any of your medications? - no    Have you seen any other physician or provider since your last visit? yes - [facility]   Have you had any other diagnostic tests since your last visit?  no   Have you been seen in the emergency room and/or had an admission in a hospital since we last saw you?  no   Have you had your routine dental cleaning in the past 6 months?  no     Do you have an active MyChart account? If no, what is the barrier?   Yes    Patient Care Team:  Jayda Uriostegui PA-C as PCP - General  Jayda Uriostegui PA-C as PCP - S Attributed Provider  Marzena Chavez DO as Consulting Physician (Obstetrics & Gynecology)    Medical History Review  Past Medical, Family, and Social History reviewed and does contribute to the patient presenting condition    Health Maintenance   Topic Date Due    HIV screen  09/05/1996    Flu vaccine (1) 01/01/2019 (Originally 9/1/2018)    Pneumococcal med risk (1 of 1 - PPSV23) 01/19/2019 (Originally 9/5/2000)    Cervical cancer screen  09/19/2019    DTaP/Tdap/Td vaccine (2 - Td) 10/19/2027

## 2018-11-30 ENCOUNTER — OFFICE VISIT (OUTPATIENT)
Dept: FAMILY MEDICINE CLINIC | Age: 38
End: 2018-11-30
Payer: COMMERCIAL

## 2018-11-30 VITALS
WEIGHT: 167.6 LBS | DIASTOLIC BLOOD PRESSURE: 62 MMHG | HEIGHT: 70 IN | HEART RATE: 76 BPM | SYSTOLIC BLOOD PRESSURE: 116 MMHG | BODY MASS INDEX: 23.99 KG/M2 | OXYGEN SATURATION: 98 %

## 2018-11-30 DIAGNOSIS — F41.9 ANXIETY: Primary | ICD-10-CM

## 2018-11-30 DIAGNOSIS — F19.10 SUBSTANCE ABUSE (HCC): ICD-10-CM

## 2018-11-30 PROCEDURE — 99213 OFFICE O/P EST LOW 20 MIN: CPT | Performed by: PHYSICIAN ASSISTANT

## 2018-11-30 PROCEDURE — 4004F PT TOBACCO SCREEN RCVD TLK: CPT | Performed by: PHYSICIAN ASSISTANT

## 2018-11-30 PROCEDURE — G8484 FLU IMMUNIZE NO ADMIN: HCPCS | Performed by: PHYSICIAN ASSISTANT

## 2018-11-30 PROCEDURE — G8420 CALC BMI NORM PARAMETERS: HCPCS | Performed by: PHYSICIAN ASSISTANT

## 2018-11-30 PROCEDURE — G8427 DOCREV CUR MEDS BY ELIG CLIN: HCPCS | Performed by: PHYSICIAN ASSISTANT

## 2018-11-30 ASSESSMENT — ENCOUNTER SYMPTOMS
COLOR CHANGE: 0
COUGH: 0
VOMITING: 0
NAUSEA: 0
ABDOMINAL PAIN: 0
SHORTNESS OF BREATH: 0
DIARRHEA: 0
CONSTIPATION: 0

## 2018-11-30 NOTE — PROGRESS NOTES
[address] [facility] [address]  Dept: [phone]  Dept Fax: [phone]    Sabas Garcia is a 41 y.o. female who presents today for her medical conditions/complaintsas noted below. Sabas Garcia is c/o of   Chief Complaint   Patient presents with    Anxiety     Patient here for follow up on anxiety         HPI:     HPI    Patient states she is having panic attacks more recently. She states when she first started detoxing she took herself off work to do treatment. She missed -today. She has been working with her employer so they know what is going on and they are requiring a drug test through them they are planning to do today. She states she does feel some improvement on the paxil dose the way it is now  She continues to follow with the [facility] for her suboxone and detox guidance  She is feeling much better overall.      Hemoglobin A1C (%)   Date Value   2014 4.9             ( goal A1Cis < 7)   No results found for: LABMICR  No results found for: LDLCHOLESTEROL, LDLCALC    (goal LDL is <100)   AST (U/L)   Date Value   2018 19     ALT (U/L)   Date Value   2018 18     BUN (mg/dL)   Date Value   2018 15     BP Readings from Last 3 Encounters:   18 116/62   11/15/18 126/70   18 104/60          (goal 120/80)    Past Medical History:   Diagnosis Date    Anemia     treated at [facility] 2014    Bronchitis     Compression fracture     Depression 2014    Enlarged uterus     Hypertension     Hypertension     Menorrhagia     Smoker     Substance abuse ([facility] [address] 11/15/2018    Vitamin D deficiency 10/28/2015    Wedge compression fracture of T11 vertebra [facility])       Past Surgical History:   Procedure Laterality Date     SECTION      [address] [facility]AL [facility]  [facility]    GASTRIC BYPASS SURGERY      16    Anxiety     2. Substance abuse ([facility] [address]               Plan:      Return in about 3 months (around 2/28/2019). Off work from 11/17-12/2/18 with RTW on 12/03/18  [facility] will [facility] with [facility] and suboxone monitoring there  Cont present paxil dose  Recheck as planned in 3 mo sooner prn      Patient given educational materials - see patient instructions. Discussed use, benefit, and side effects of prescribed medications. All patientquestions answered. Pt voiced understanding. Reviewed health maintenance. Instructedto continue current medications, diet and exercise. Patient agreed with treatmentplan. Follow up as directed.      Electronically signed by Barbara Monet PA-C on 11/30/2018 at 9:22 AM

## 2019-01-01 ENCOUNTER — TELEPHONE (OUTPATIENT)
Dept: FAMILY MEDICINE CLINIC | Age: 39
End: 2019-01-01

## 2019-01-01 ENCOUNTER — TELEPHONE (OUTPATIENT)
Dept: ORTHOPEDIC SURGERY | Age: 39
End: 2019-01-01

## 2019-01-01 ENCOUNTER — APPOINTMENT (OUTPATIENT)
Dept: GENERAL RADIOLOGY | Age: 39
End: 2019-01-01
Payer: COMMERCIAL

## 2019-01-01 ENCOUNTER — OFFICE VISIT (OUTPATIENT)
Dept: FAMILY MEDICINE CLINIC | Age: 39
End: 2019-01-01
Payer: COMMERCIAL

## 2019-01-01 ENCOUNTER — APPOINTMENT (OUTPATIENT)
Dept: GENERAL RADIOLOGY | Age: 39
DRG: 812 | End: 2019-01-01
Payer: MEDICARE

## 2019-01-01 ENCOUNTER — APPOINTMENT (OUTPATIENT)
Dept: CT IMAGING | Age: 39
DRG: 812 | End: 2019-01-01
Payer: MEDICARE

## 2019-01-01 ENCOUNTER — OFFICE VISIT (OUTPATIENT)
Dept: ORTHOPEDIC SURGERY | Age: 39
End: 2019-01-01
Payer: COMMERCIAL

## 2019-01-01 ENCOUNTER — HOSPITAL ENCOUNTER (EMERGENCY)
Age: 39
Discharge: HOME | End: 2019-02-04
Attending: EMERGENCY MEDICINE
Payer: COMMERCIAL

## 2019-01-01 ENCOUNTER — HOSPITAL ENCOUNTER (EMERGENCY)
Age: 39
Discharge: HOME | End: 2019-01-13
Attending: EMERGENCY MEDICINE
Payer: COMMERCIAL

## 2019-01-01 ENCOUNTER — HOSPITAL ENCOUNTER (INPATIENT)
Age: 39
LOS: 2 days | DRG: 812 | End: 2019-12-11
Attending: EMERGENCY MEDICINE | Admitting: INTERNAL MEDICINE
Payer: MEDICARE

## 2019-01-01 ENCOUNTER — APPOINTMENT (OUTPATIENT)
Dept: CT IMAGING | Age: 39
End: 2019-01-01
Payer: COMMERCIAL

## 2019-01-01 ENCOUNTER — HOSPITAL ENCOUNTER (EMERGENCY)
Age: 39
Discharge: HOME | End: 2019-06-07
Attending: EMERGENCY MEDICINE
Payer: MEDICARE

## 2019-01-01 VITALS
BODY MASS INDEX: 22.62 KG/M2 | TEMPERATURE: 97.8 F | RESPIRATION RATE: 16 BRPM | WEIGHT: 158 LBS | SYSTOLIC BLOOD PRESSURE: 158 MMHG | OXYGEN SATURATION: 99 % | HEIGHT: 70 IN | HEART RATE: 102 BPM | DIASTOLIC BLOOD PRESSURE: 88 MMHG

## 2019-01-01 VITALS
BODY MASS INDEX: 22.62 KG/M2 | WEIGHT: 158 LBS | HEIGHT: 70 IN | DIASTOLIC BLOOD PRESSURE: 78 MMHG | SYSTOLIC BLOOD PRESSURE: 125 MMHG | HEART RATE: 82 BPM

## 2019-01-01 VITALS
TEMPERATURE: 98 F | RESPIRATION RATE: 16 BRPM | WEIGHT: 170 LBS | HEIGHT: 70 IN | OXYGEN SATURATION: 99 % | DIASTOLIC BLOOD PRESSURE: 63 MMHG | BODY MASS INDEX: 24.34 KG/M2 | SYSTOLIC BLOOD PRESSURE: 116 MMHG | HEART RATE: 54 BPM

## 2019-01-01 VITALS
BODY MASS INDEX: 25.48 KG/M2 | OXYGEN SATURATION: 98 % | HEART RATE: 62 BPM | SYSTOLIC BLOOD PRESSURE: 118 MMHG | HEIGHT: 70 IN | DIASTOLIC BLOOD PRESSURE: 76 MMHG | WEIGHT: 178 LBS

## 2019-01-01 VITALS
HEIGHT: 70 IN | BODY MASS INDEX: 23.62 KG/M2 | TEMPERATURE: 97.3 F | OXYGEN SATURATION: 98 % | HEART RATE: 77 BPM | DIASTOLIC BLOOD PRESSURE: 84 MMHG | WEIGHT: 165 LBS | SYSTOLIC BLOOD PRESSURE: 128 MMHG | RESPIRATION RATE: 16 BRPM

## 2019-01-01 VITALS
WEIGHT: 170 LBS | HEIGHT: 70 IN | SYSTOLIC BLOOD PRESSURE: 122 MMHG | BODY MASS INDEX: 24.34 KG/M2 | HEART RATE: 67 BPM | DIASTOLIC BLOOD PRESSURE: 74 MMHG

## 2019-01-01 VITALS
HEART RATE: 82 BPM | WEIGHT: 155 LBS | BODY MASS INDEX: 22.19 KG/M2 | DIASTOLIC BLOOD PRESSURE: 73 MMHG | SYSTOLIC BLOOD PRESSURE: 131 MMHG | RESPIRATION RATE: 20 BRPM | HEIGHT: 70 IN

## 2019-01-01 DIAGNOSIS — F32.A DEPRESSION, UNSPECIFIED DEPRESSION TYPE: ICD-10-CM

## 2019-01-01 DIAGNOSIS — M25.562 CHRONIC PAIN OF LEFT KNEE: Primary | ICD-10-CM

## 2019-01-01 DIAGNOSIS — R11.2 NON-INTRACTABLE VOMITING WITH NAUSEA, UNSPECIFIED VOMITING TYPE: Primary | ICD-10-CM

## 2019-01-01 DIAGNOSIS — S80.02XA CONTUSION OF LEFT KNEE, INITIAL ENCOUNTER: Primary | ICD-10-CM

## 2019-01-01 DIAGNOSIS — F41.9 ANXIETY: ICD-10-CM

## 2019-01-01 DIAGNOSIS — M25.562 LEFT KNEE PAIN, UNSPECIFIED CHRONICITY: Primary | ICD-10-CM

## 2019-01-01 DIAGNOSIS — M77.8 THUMB TENDONITIS: ICD-10-CM

## 2019-01-01 DIAGNOSIS — S89.92XA INJURY OF LEFT KNEE, INITIAL ENCOUNTER: Primary | ICD-10-CM

## 2019-01-01 DIAGNOSIS — F32.A DEPRESSION, UNSPECIFIED DEPRESSION TYPE: Primary | ICD-10-CM

## 2019-01-01 DIAGNOSIS — Z13.31 POSITIVE DEPRESSION SCREENING: ICD-10-CM

## 2019-01-01 DIAGNOSIS — R10.84 GENERALIZED ABDOMINAL PAIN: ICD-10-CM

## 2019-01-01 DIAGNOSIS — S80.02XS CONTUSION OF LEFT KNEE, SEQUELA: Primary | ICD-10-CM

## 2019-01-01 DIAGNOSIS — G89.29 CHRONIC PAIN OF LEFT KNEE: Primary | ICD-10-CM

## 2019-01-01 DIAGNOSIS — G56.01 CARPAL TUNNEL SYNDROME OF RIGHT WRIST: Primary | ICD-10-CM

## 2019-01-01 LAB
-: ABNORMAL
-: NORMAL
ABSOLUTE EOS #: 0 K/UL (ref 0–0.4)
ABSOLUTE EOS #: 0 K/UL (ref 0–0.4)
ABSOLUTE EOS #: 0.07 K/UL (ref 0–0.4)
ABSOLUTE EOS #: 0.08 K/UL (ref 0–0.44)
ABSOLUTE EOS #: 0.16 K/UL (ref 0–0.44)
ABSOLUTE IMMATURE GRANULOCYTE: 0.05 K/UL (ref 0–0.3)
ABSOLUTE IMMATURE GRANULOCYTE: 0.21 K/UL (ref 0–0.3)
ABSOLUTE IMMATURE GRANULOCYTE: 0.27 K/UL (ref 0–0.3)
ABSOLUTE IMMATURE GRANULOCYTE: 1.37 K/UL (ref 0–0.3)
ABSOLUTE IMMATURE GRANULOCYTE: <0.03 K/UL (ref 0–0.3)
ABSOLUTE LYMPH #: 0.14 K/UL (ref 1–4.8)
ABSOLUTE LYMPH #: 0.74 K/UL (ref 1.1–3.7)
ABSOLUTE LYMPH #: 1.06 K/UL (ref 1–4.8)
ABSOLUTE LYMPH #: 1.95 K/UL (ref 1.1–3.7)
ABSOLUTE LYMPH #: 6.18 K/UL (ref 1–4.8)
ABSOLUTE MONO #: 0 K/UL (ref 0.1–0.8)
ABSOLUTE MONO #: 0.1 K/UL (ref 0.1–1.2)
ABSOLUTE MONO #: 0.14 K/UL (ref 0.1–0.8)
ABSOLUTE MONO #: 0.69 K/UL (ref 0.1–1.2)
ABSOLUTE MONO #: 1.83 K/UL (ref 0.1–0.8)
ACTION: NORMAL
ALBUMIN SERPL-MCNC: 2.4 G/DL (ref 3.5–5.2)
ALBUMIN SERPL-MCNC: 2.9 G/DL (ref 3.5–5.2)
ALBUMIN SERPL-MCNC: 3.6 G/DL (ref 3.5–5.2)
ALBUMIN SERPL-MCNC: 3.9 G/DL (ref 3.5–5.2)
ALBUMIN/GLOBULIN RATIO: 1.1 (ref 1–2.5)
ALBUMIN/GLOBULIN RATIO: 1.4 (ref 1–2.5)
ALBUMIN/GLOBULIN RATIO: 1.5 (ref 1–2.5)
ALBUMIN/GLOBULIN RATIO: 1.6 (ref 1–2.5)
ALLEN TEST: ABNORMAL
ALLEN TEST: NORMAL
ALP BLD-CCNC: 111 U/L (ref 35–104)
ALP BLD-CCNC: 57 U/L (ref 35–104)
ALP BLD-CCNC: 69 U/L (ref 35–104)
ALP BLD-CCNC: 77 U/L (ref 35–104)
ALT SERPL-CCNC: 1099 U/L (ref 5–33)
ALT SERPL-CCNC: 1107 U/L (ref 5–33)
ALT SERPL-CCNC: 20 U/L (ref 5–33)
ALT SERPL-CCNC: 888 U/L (ref 5–33)
AMORPHOUS: ABNORMAL
AMORPHOUS: NORMAL
AMPHETAMINE SCREEN URINE: NEGATIVE
ANION GAP SERPL CALCULATED.3IONS-SCNC: 12 MMOL/L (ref 9–17)
ANION GAP SERPL CALCULATED.3IONS-SCNC: 15 MMOL/L (ref 9–17)
ANION GAP SERPL CALCULATED.3IONS-SCNC: 15 MMOL/L (ref 9–17)
ANION GAP SERPL CALCULATED.3IONS-SCNC: 16 MMOL/L (ref 9–17)
ANION GAP SERPL CALCULATED.3IONS-SCNC: 17 MMOL/L (ref 9–17)
ANION GAP SERPL CALCULATED.3IONS-SCNC: 18 MMOL/L (ref 9–17)
ANION GAP SERPL CALCULATED.3IONS-SCNC: 19 MMOL/L (ref 9–17)
ANION GAP SERPL CALCULATED.3IONS-SCNC: 27 MMOL/L (ref 9–17)
ANION GAP SERPL CALCULATED.3IONS-SCNC: 35 MMOL/L (ref 9–17)
ANION GAP SERPL CALCULATED.3IONS-SCNC: 36 MMOL/L (ref 9–17)
ANION GAP: 12 MMOL/L (ref 7–16)
ANION GAP: 13 MMOL/L (ref 7–16)
ANION GAP: 24 MMOL/L (ref 7–16)
ANION GAP: 35 MMOL/L (ref 7–16)
AST SERPL-CCNC: 1115 U/L
AST SERPL-CCNC: 1430 U/L
AST SERPL-CCNC: 25 U/L
AST SERPL-CCNC: 499 U/L
BACTERIA: ABNORMAL
BACTERIA: NORMAL
BARBITURATE SCREEN URINE: NEGATIVE
BASOPHILS # BLD: 0 % (ref 0–2)
BASOPHILS # BLD: 1 % (ref 0–2)
BASOPHILS ABSOLUTE: 0 K/UL (ref 0–0.2)
BASOPHILS ABSOLUTE: 0 K/UL (ref 0–0.2)
BASOPHILS ABSOLUTE: 0.03 K/UL (ref 0–0.2)
BASOPHILS ABSOLUTE: 0.23 K/UL (ref 0–0.2)
BASOPHILS ABSOLUTE: <0.03 K/UL (ref 0–0.2)
BENZODIAZEPINE SCREEN, URINE: NEGATIVE
BILIRUB SERPL-MCNC: 0.21 MG/DL (ref 0.3–1.2)
BILIRUB SERPL-MCNC: 0.24 MG/DL (ref 0.3–1.2)
BILIRUB SERPL-MCNC: 0.49 MG/DL (ref 0.3–1.2)
BILIRUB SERPL-MCNC: 0.74 MG/DL (ref 0.3–1.2)
BILIRUBIN DIRECT: 0.24 MG/DL
BILIRUBIN URINE: NEGATIVE
BILIRUBIN URINE: NEGATIVE
BILIRUBIN, INDIRECT: 0.25 MG/DL (ref 0–1)
BNP INTERPRETATION: NORMAL
BUN BLDV-MCNC: 11 MG/DL (ref 6–20)
BUN BLDV-MCNC: 14 MG/DL (ref 6–20)
BUN BLDV-MCNC: 16 MG/DL (ref 6–20)
BUN BLDV-MCNC: 20 MG/DL (ref 6–20)
BUN BLDV-MCNC: 26 MG/DL (ref 6–20)
BUN BLDV-MCNC: 32 MG/DL (ref 6–20)
BUN BLDV-MCNC: 36 MG/DL (ref 6–20)
BUN BLDV-MCNC: 37 MG/DL (ref 6–20)
BUN BLDV-MCNC: 41 MG/DL (ref 6–20)
BUN BLDV-MCNC: 45 MG/DL (ref 6–20)
BUN BLDV-MCNC: 49 MG/DL (ref 6–20)
BUN BLDV-MCNC: 51 MG/DL (ref 6–20)
BUN BLDV-MCNC: 54 MG/DL (ref 6–20)
BUN BLDV-MCNC: 54 MG/DL (ref 6–20)
BUN BLDV-MCNC: 56 MG/DL (ref 6–20)
BUN/CREAT BLD: ABNORMAL (ref 9–20)
BUPRENORPHINE URINE: ABNORMAL
CALCIUM IONIZED: 0.88 MMOL/L (ref 1.13–1.33)
CALCIUM IONIZED: 0.94 MMOL/L (ref 1.13–1.33)
CALCIUM IONIZED: 0.99 MMOL/L (ref 1.13–1.33)
CALCIUM IONIZED: 1.01 MMOL/L (ref 1.13–1.33)
CALCIUM IONIZED: 1.01 MMOL/L (ref 1.13–1.33)
CALCIUM IONIZED: 1.03 MMOL/L (ref 1.13–1.33)
CALCIUM IONIZED: 1.03 MMOL/L (ref 1.13–1.33)
CALCIUM IONIZED: 1.04 MMOL/L (ref 1.13–1.33)
CALCIUM IONIZED: 1.07 MMOL/L (ref 1.13–1.33)
CALCIUM IONIZED: 1.07 MMOL/L (ref 1.13–1.33)
CALCIUM IONIZED: 1.11 MMOL/L (ref 1.13–1.33)
CALCIUM SERPL-MCNC: 6.8 MG/DL (ref 8.6–10.4)
CALCIUM SERPL-MCNC: 7.4 MG/DL (ref 8.6–10.4)
CALCIUM SERPL-MCNC: 7.4 MG/DL (ref 8.6–10.4)
CALCIUM SERPL-MCNC: 7.6 MG/DL (ref 8.6–10.4)
CALCIUM SERPL-MCNC: 7.6 MG/DL (ref 8.6–10.4)
CALCIUM SERPL-MCNC: 7.9 MG/DL (ref 8.6–10.4)
CALCIUM SERPL-MCNC: 8 MG/DL (ref 8.6–10.4)
CALCIUM SERPL-MCNC: 8.1 MG/DL (ref 8.6–10.4)
CALCIUM SERPL-MCNC: 8.1 MG/DL (ref 8.6–10.4)
CALCIUM SERPL-MCNC: 8.2 MG/DL (ref 8.6–10.4)
CALCIUM SERPL-MCNC: 8.2 MG/DL (ref 8.6–10.4)
CALCIUM SERPL-MCNC: 8.5 MG/DL (ref 8.6–10.4)
CALCIUM SERPL-MCNC: 8.6 MG/DL (ref 8.6–10.4)
CALCIUM SERPL-MCNC: 8.9 MG/DL (ref 8.6–10.4)
CALCIUM SERPL-MCNC: 9 MG/DL (ref 8.6–10.4)
CANNABINOID SCREEN URINE: NEGATIVE
CASTS UA: ABNORMAL /LPF (ref 0–8)
CASTS UA: NORMAL /LPF (ref 0–8)
CHLORIDE BLD-SCNC: 100 MMOL/L (ref 98–107)
CHLORIDE BLD-SCNC: 100 MMOL/L (ref 98–107)
CHLORIDE BLD-SCNC: 101 MMOL/L (ref 98–107)
CHLORIDE BLD-SCNC: 102 MMOL/L (ref 98–107)
CHLORIDE BLD-SCNC: 103 MMOL/L (ref 98–107)
CHLORIDE BLD-SCNC: 93 MMOL/L (ref 98–107)
CHLORIDE BLD-SCNC: 94 MMOL/L (ref 98–107)
CHLORIDE BLD-SCNC: 94 MMOL/L (ref 98–107)
CHLORIDE BLD-SCNC: 95 MMOL/L (ref 98–107)
CHLORIDE BLD-SCNC: 97 MMOL/L (ref 98–107)
CHLORIDE BLD-SCNC: 99 MMOL/L (ref 98–107)
CO2: 12 MMOL/L (ref 20–31)
CO2: 14 MMOL/L (ref 20–31)
CO2: 19 MMOL/L (ref 20–31)
CO2: 20 MMOL/L (ref 20–31)
CO2: 21 MMOL/L (ref 20–31)
CO2: 21 MMOL/L (ref 20–31)
CO2: 22 MMOL/L (ref 20–31)
CO2: 23 MMOL/L (ref 20–31)
CO2: 7 MMOL/L (ref 20–31)
COCAINE METABOLITE, URINE: POSITIVE
COLOR: YELLOW
COLOR: YELLOW
COMMENT UA: ABNORMAL
CREAT SERPL-MCNC: 0.58 MG/DL (ref 0.5–0.9)
CREAT SERPL-MCNC: 1.51 MG/DL (ref 0.5–0.9)
CREAT SERPL-MCNC: 1.53 MG/DL (ref 0.5–0.9)
CREAT SERPL-MCNC: 1.66 MG/DL (ref 0.5–0.9)
CREAT SERPL-MCNC: 1.79 MG/DL (ref 0.5–0.9)
CREAT SERPL-MCNC: 1.95 MG/DL (ref 0.5–0.9)
CREAT SERPL-MCNC: 2.3 MG/DL (ref 0.5–0.9)
CREAT SERPL-MCNC: 2.34 MG/DL (ref 0.5–0.9)
CREAT SERPL-MCNC: 2.58 MG/DL (ref 0.5–0.9)
CREAT SERPL-MCNC: 2.77 MG/DL (ref 0.5–0.9)
CREAT SERPL-MCNC: 2.87 MG/DL (ref 0.5–0.9)
CREAT SERPL-MCNC: 2.93 MG/DL (ref 0.5–0.9)
CREAT SERPL-MCNC: 2.94 MG/DL (ref 0.5–0.9)
CREAT SERPL-MCNC: 3.14 MG/DL (ref 0.5–0.9)
CREAT SERPL-MCNC: 3.17 MG/DL (ref 0.5–0.9)
CRYSTALS, UA: ABNORMAL /HPF
CRYSTALS, UA: NORMAL /HPF
CULTURE: NO GROWTH
DATE AND TIME: NORMAL
DIFFERENTIAL TYPE: ABNORMAL
EOSINOPHILS RELATIVE PERCENT: 0 % (ref 1–4)
EOSINOPHILS RELATIVE PERCENT: 0 % (ref 1–4)
EOSINOPHILS RELATIVE PERCENT: 1 % (ref 1–4)
EPITHELIAL CELLS UA: ABNORMAL /HPF (ref 0–5)
EPITHELIAL CELLS UA: NORMAL /HPF (ref 0–5)
FIO2: 100
FIO2: 40
FIO2: 50
FIO2: 50
FIO2: 6
FIO2: 60
FIO2: 70
FIO2: ABNORMAL
FIO2: NORMAL
FOLATE: >20 NG/ML
GFR AFRICAN AMERICAN: 20 ML/MIN
GFR AFRICAN AMERICAN: 20 ML/MIN
GFR AFRICAN AMERICAN: 22 ML/MIN
GFR AFRICAN AMERICAN: 23 ML/MIN
GFR AFRICAN AMERICAN: 25 ML/MIN
GFR AFRICAN AMERICAN: 28 ML/MIN
GFR AFRICAN AMERICAN: 29 ML/MIN
GFR AFRICAN AMERICAN: 35 ML/MIN
GFR AFRICAN AMERICAN: 38 ML/MIN
GFR AFRICAN AMERICAN: 42 ML/MIN
GFR AFRICAN AMERICAN: >60 ML/MIN
GFR AFRICAN AMERICAN: ABNORMAL ML/MIN
GFR AFRICAN AMERICAN: ABNORMAL ML/MIN
GFR NON-AFRICAN AMERICAN: 14 ML/MIN
GFR NON-AFRICAN AMERICAN: 16 ML/MIN
GFR NON-AFRICAN AMERICAN: 17 ML/MIN
GFR NON-AFRICAN AMERICAN: 18 ML/MIN
GFR NON-AFRICAN AMERICAN: 19 ML/MIN
GFR NON-AFRICAN AMERICAN: 21 ML/MIN
GFR NON-AFRICAN AMERICAN: 23 ML/MIN
GFR NON-AFRICAN AMERICAN: 24 ML/MIN
GFR NON-AFRICAN AMERICAN: 28 ML/MIN
GFR NON-AFRICAN AMERICAN: 29 ML/MIN
GFR NON-AFRICAN AMERICAN: 32 ML/MIN
GFR NON-AFRICAN AMERICAN: 35 ML/MIN
GFR NON-AFRICAN AMERICAN: >60 ML/MIN
GFR NON-AFRICAN AMERICAN: ABNORMAL ML/MIN
GFR NON-AFRICAN AMERICAN: ABNORMAL ML/MIN
GFR NON-AFRICAN AMERICAN: NORMAL ML/MIN
GFR NON-AFRICAN AMERICAN: NORMAL ML/MIN
GFR SERPL CREATININE-BSD FRML MDRD: 17 ML/MIN
GFR SERPL CREATININE-BSD FRML MDRD: 34 ML/MIN
GFR SERPL CREATININE-BSD FRML MDRD: ABNORMAL ML/MIN/{1.73_M2}
GFR SERPL CREATININE-BSD FRML MDRD: NORMAL ML/MIN
GFR SERPL CREATININE-BSD FRML MDRD: NORMAL ML/MIN
GFR SERPL CREATININE-BSD FRML MDRD: NORMAL ML/MIN/{1.73_M2}
GFR SERPL CREATININE-BSD FRML MDRD: NORMAL ML/MIN/{1.73_M2}
GLOBULIN: ABNORMAL G/DL (ref 1.5–3.8)
GLUCOSE BLD-MCNC: 102 MG/DL (ref 70–99)
GLUCOSE BLD-MCNC: 104 MG/DL (ref 65–105)
GLUCOSE BLD-MCNC: 106 MG/DL (ref 70–99)
GLUCOSE BLD-MCNC: 108 MG/DL (ref 70–99)
GLUCOSE BLD-MCNC: 108 MG/DL (ref 74–100)
GLUCOSE BLD-MCNC: 108 MG/DL (ref 74–100)
GLUCOSE BLD-MCNC: 110 MG/DL (ref 70–99)
GLUCOSE BLD-MCNC: 116 MG/DL (ref 74–100)
GLUCOSE BLD-MCNC: 117 MG/DL (ref 74–100)
GLUCOSE BLD-MCNC: 124 MG/DL (ref 70–99)
GLUCOSE BLD-MCNC: 125 MG/DL (ref 65–105)
GLUCOSE BLD-MCNC: 125 MG/DL (ref 70–99)
GLUCOSE BLD-MCNC: 128 MG/DL (ref 70–99)
GLUCOSE BLD-MCNC: 134 MG/DL (ref 70–99)
GLUCOSE BLD-MCNC: 166 MG/DL (ref 70–99)
GLUCOSE BLD-MCNC: 175 MG/DL (ref 70–99)
GLUCOSE BLD-MCNC: 179 MG/DL (ref 65–105)
GLUCOSE BLD-MCNC: 232 MG/DL (ref 70–99)
GLUCOSE BLD-MCNC: 81 MG/DL (ref 74–100)
GLUCOSE BLD-MCNC: 90 MG/DL (ref 65–105)
GLUCOSE BLD-MCNC: 91 MG/DL (ref 65–105)
GLUCOSE BLD-MCNC: 92 MG/DL (ref 70–99)
GLUCOSE BLD-MCNC: 92 MG/DL (ref 70–99)
GLUCOSE BLD-MCNC: 95 MG/DL (ref 65–105)
GLUCOSE BLD-MCNC: 95 MG/DL (ref 74–100)
GLUCOSE BLD-MCNC: 96 MG/DL (ref 65–105)
GLUCOSE BLD-MCNC: 99 MG/DL (ref 65–105)
GLUCOSE URINE: NEGATIVE
GLUCOSE URINE: NEGATIVE
HCG QUALITATIVE: NEGATIVE
HCG(URINE) PREGNANCY TEST: NEGATIVE
HCO3 VENOUS: 25.1 MMOL/L (ref 22–29)
HCT VFR BLD CALC: 35.4 % (ref 36.3–47.1)
HCT VFR BLD CALC: 39.7 % (ref 36.3–47.1)
HCT VFR BLD CALC: 40.4 % (ref 36.3–47.1)
HCT VFR BLD CALC: 43 % (ref 36.3–47.1)
HCT VFR BLD CALC: 43.2 % (ref 36.3–47.1)
HEMOGLOBIN: 12.4 G/DL (ref 11.9–15.1)
HEMOGLOBIN: 12.4 G/DL (ref 11.9–15.1)
HEMOGLOBIN: 13 G/DL (ref 11.9–15.1)
HEMOGLOBIN: 14.2 G/DL (ref 11.9–15.1)
HEMOGLOBIN: 14.6 G/DL (ref 11.9–15.1)
IMMATURE GRANULOCYTES: 0 %
IMMATURE GRANULOCYTES: 0 %
IMMATURE GRANULOCYTES: 2 %
IMMATURE GRANULOCYTES: 3 %
IMMATURE GRANULOCYTES: 6 %
INR BLD: 1.3
INR BLD: 1.4
KETONES, URINE: ABNORMAL
KETONES, URINE: NEGATIVE
LACTIC ACID, WHOLE BLOOD: 12.6 MMOL/L (ref 0.7–2.1)
LACTIC ACID, WHOLE BLOOD: 2.7 MMOL/L (ref 0.7–2.1)
LACTIC ACID, WHOLE BLOOD: 2.7 MMOL/L (ref 0.7–2.1)
LACTIC ACID, WHOLE BLOOD: 21 MMOL/L (ref 0.7–2.1)
LACTIC ACID, WHOLE BLOOD: 3 MMOL/L (ref 0.7–2.1)
LACTIC ACID, WHOLE BLOOD: 3 MMOL/L (ref 0.7–2.1)
LACTIC ACID, WHOLE BLOOD: 3.2 MMOL/L (ref 0.7–2.1)
LACTIC ACID, WHOLE BLOOD: 3.2 MMOL/L (ref 0.7–2.1)
LACTIC ACID, WHOLE BLOOD: 3.7 MMOL/L (ref 0.7–2.1)
LACTIC ACID, WHOLE BLOOD: 3.7 MMOL/L (ref 0.7–2.1)
LACTIC ACID, WHOLE BLOOD: 6 MMOL/L (ref 0.7–2.1)
LACTIC ACID, WHOLE BLOOD: 6 MMOL/L (ref 0.7–2.1)
LACTIC ACID: ABNORMAL MMOL/L
LEUKOCYTE ESTERASE, URINE: ABNORMAL
LEUKOCYTE ESTERASE, URINE: NEGATIVE
LIPASE: 14 U/L (ref 13–60)
LIPASE: 26 U/L (ref 13–60)
LV EF: 26 %
LVEF MODALITY: NORMAL
LYMPHOCYTES # BLD: 13 % (ref 24–43)
LYMPHOCYTES # BLD: 16 % (ref 24–43)
LYMPHOCYTES # BLD: 2 % (ref 24–44)
LYMPHOCYTES # BLD: 27 % (ref 24–44)
LYMPHOCYTES # BLD: 8 % (ref 24–44)
Lab: NORMAL
MAGNESIUM: 1.5 MG/DL (ref 1.6–2.6)
MAGNESIUM: 1.5 MG/DL (ref 1.6–2.6)
MAGNESIUM: 1.6 MG/DL (ref 1.6–2.6)
MAGNESIUM: 1.6 MG/DL (ref 1.6–2.6)
MAGNESIUM: 1.7 MG/DL (ref 1.6–2.6)
MAGNESIUM: 1.8 MG/DL (ref 1.6–2.6)
MAGNESIUM: 1.9 MG/DL (ref 1.6–2.6)
MAGNESIUM: 1.9 MG/DL (ref 1.6–2.6)
MAGNESIUM: 2.2 MG/DL (ref 1.6–2.6)
MAGNESIUM: 2.6 MG/DL (ref 1.6–2.6)
MCH RBC QN AUTO: 31.5 PG (ref 25.2–33.5)
MCH RBC QN AUTO: 31.5 PG (ref 25.2–33.5)
MCH RBC QN AUTO: 31.6 PG (ref 25.2–33.5)
MCH RBC QN AUTO: 31.7 PG (ref 25.2–33.5)
MCH RBC QN AUTO: 32.2 PG (ref 25.2–33.5)
MCHC RBC AUTO-ENTMCNC: 28.8 G/DL (ref 28.4–34.8)
MCHC RBC AUTO-ENTMCNC: 32.7 G/DL (ref 28.4–34.8)
MCHC RBC AUTO-ENTMCNC: 33.8 G/DL (ref 28.4–34.8)
MCHC RBC AUTO-ENTMCNC: 35 G/DL (ref 28.4–34.8)
MCHC RBC AUTO-ENTMCNC: 35.1 G/DL (ref 28.4–34.8)
MCV RBC AUTO: 110 FL (ref 82.6–102.9)
MCV RBC AUTO: 89.6 FL (ref 82.6–102.9)
MCV RBC AUTO: 90.3 FL (ref 82.6–102.9)
MCV RBC AUTO: 93.1 FL (ref 82.6–102.9)
MCV RBC AUTO: 98.3 FL (ref 82.6–102.9)
MDMA URINE: ABNORMAL
METHADONE SCREEN, URINE: NEGATIVE
METHAMPHETAMINE, URINE: ABNORMAL
MODE: ABNORMAL
MODE: NORMAL
MONOCYTES # BLD: 0 % (ref 1–7)
MONOCYTES # BLD: 2 % (ref 1–7)
MONOCYTES # BLD: 2 % (ref 3–12)
MONOCYTES # BLD: 6 % (ref 3–12)
MONOCYTES # BLD: 8 % (ref 1–7)
MORPHOLOGY: ABNORMAL
MORPHOLOGY: ABNORMAL
MORPHOLOGY: NORMAL
MRSA, DNA, NASAL: NORMAL
MUCUS: ABNORMAL
MUCUS: NORMAL
MYOGLOBIN: 486 NG/ML (ref 25–58)
NEGATIVE BASE EXCESS, ART: 1 (ref 0–2)
NEGATIVE BASE EXCESS, ART: 14 (ref 0–2)
NEGATIVE BASE EXCESS, ART: 2 (ref 0–2)
NEGATIVE BASE EXCESS, ART: 2 (ref 0–2)
NEGATIVE BASE EXCESS, ART: 21 (ref 0–2)
NEGATIVE BASE EXCESS, ART: 3 (ref 0–2)
NEGATIVE BASE EXCESS, ART: 3 (ref 0–2)
NEGATIVE BASE EXCESS, ART: 4 (ref 0–2)
NEGATIVE BASE EXCESS, ART: <30 (ref 0–2)
NEGATIVE BASE EXCESS, ART: ABNORMAL (ref 0–2)
NEGATIVE BASE EXCESS, VEN: ABNORMAL (ref 0–2)
NITRITE, URINE: NEGATIVE
NITRITE, URINE: NEGATIVE
NOTIFY: NORMAL
NRBC AUTOMATED: 0 PER 100 WBC
NRBC AUTOMATED: 0.1 PER 100 WBC
O2 DEVICE/FLOW/%: ABNORMAL
O2 DEVICE/FLOW/%: NORMAL
O2 SAT, VEN: 99 % (ref 60–85)
OPIATES, URINE: NEGATIVE
OTHER OBSERVATIONS UA: ABNORMAL
OTHER OBSERVATIONS UA: NORMAL
OXYCODONE SCREEN URINE: NEGATIVE
PARTIAL THROMBOPLASTIN TIME: 30.8 SEC (ref 20.5–30.5)
PATIENT TEMP: 29.7
PATIENT TEMP: 32.1
PATIENT TEMP: 32.9
PATIENT TEMP: 34.2
PATIENT TEMP: ABNORMAL
PATIENT TEMP: NORMAL
PCO2, VEN: 36.3 MM HG (ref 41–51)
PDW BLD-RTO: 12.8 % (ref 11.8–14.4)
PDW BLD-RTO: 13.2 % (ref 11.8–14.4)
PDW BLD-RTO: 13.2 % (ref 11.8–14.4)
PDW BLD-RTO: 13.5 % (ref 11.8–14.4)
PDW BLD-RTO: 13.5 % (ref 11.8–14.4)
PH UA: 5 (ref 5–8)
PH UA: 6 (ref 5–8)
PH VENOUS: 7.45 (ref 7.32–7.43)
PHENCYCLIDINE, URINE: NEGATIVE
PHOSPHORUS: 12.2 MG/DL (ref 2.6–4.5)
PHOSPHORUS: 3.4 MG/DL (ref 2.6–4.5)
PHOSPHORUS: 3.4 MG/DL (ref 2.6–4.5)
PHOSPHORUS: 4.4 MG/DL (ref 2.6–4.5)
PHOSPHORUS: 4.5 MG/DL (ref 2.6–4.5)
PHOSPHORUS: 4.7 MG/DL (ref 2.6–4.5)
PHOSPHORUS: 4.8 MG/DL (ref 2.6–4.5)
PHOSPHORUS: 4.8 MG/DL (ref 2.6–4.5)
PHOSPHORUS: 6.1 MG/DL (ref 2.6–4.5)
PHOSPHORUS: 6.5 MG/DL (ref 2.6–4.5)
PHOSPHORUS: 6.8 MG/DL (ref 2.6–4.5)
PHOSPHORUS: 7.6 MG/DL (ref 2.6–4.5)
PLATELET # BLD: 110 K/UL (ref 138–453)
PLATELET # BLD: 172 K/UL (ref 138–453)
PLATELET # BLD: 259 K/UL (ref 138–453)
PLATELET # BLD: 283 K/UL (ref 138–453)
PLATELET # BLD: 353 K/UL (ref 138–453)
PLATELET ESTIMATE: ABNORMAL
PMV BLD AUTO: 10.3 FL (ref 8.1–13.5)
PMV BLD AUTO: 10.5 FL (ref 8.1–13.5)
PMV BLD AUTO: 11.1 FL (ref 8.1–13.5)
PMV BLD AUTO: 11.7 FL (ref 8.1–13.5)
PMV BLD AUTO: 12.1 FL (ref 8.1–13.5)
PO2, VEN: 116.3 MM HG (ref 30–50)
POC CHLORIDE: 102 MMOL/L (ref 98–107)
POC CHLORIDE: 102 MMOL/L (ref 98–107)
POC CHLORIDE: 103 MMOL/L (ref 98–107)
POC CHLORIDE: 93 MMOL/L (ref 98–107)
POC CREATININE: 1.57 MG/DL (ref 0.51–1.19)
POC CREATININE: 2 MG/DL (ref 0.51–1.19)
POC CREATININE: 3.55 MG/DL (ref 0.51–1.19)
POC CREATININE: NORMAL MG/DL (ref 0.51–1.19)
POC HCO3: 10.9 MMOL/L (ref 21–28)
POC HCO3: 14.7 MMOL/L (ref 21–28)
POC HCO3: 20.1 MMOL/L (ref 21–28)
POC HCO3: 20.6 MMOL/L (ref 21–28)
POC HCO3: 21 MMOL/L (ref 21–28)
POC HCO3: 22.2 MMOL/L (ref 21–28)
POC HCO3: 22.4 MMOL/L (ref 21–28)
POC HCO3: 24.2 MMOL/L (ref 21–28)
POC HCO3: 24.7 MMOL/L (ref 21–28)
POC HCO3: 24.8 MMOL/L (ref 21–28)
POC HCO3: 25.4 MMOL/L (ref 21–28)
POC HCO3: 31.5 MMOL/L (ref 21–28)
POC HCO3: 4.2 MMOL/L (ref 21–28)
POC HEMATOCRIT: 30 % (ref 36–46)
POC HEMATOCRIT: 34 % (ref 36–46)
POC HEMATOCRIT: 39 % (ref 36–46)
POC HEMATOCRIT: 39 % (ref 36–46)
POC HEMOGLOBIN: 10.3 G/DL (ref 12–16)
POC HEMOGLOBIN: 11.5 G/DL (ref 12–16)
POC HEMOGLOBIN: 13.3 G/DL (ref 12–16)
POC HEMOGLOBIN: 13.3 G/DL (ref 12–16)
POC IONIZED CALCIUM: 0.9 MMOL/L (ref 1.15–1.33)
POC IONIZED CALCIUM: 1.03 MMOL/L (ref 1.15–1.33)
POC IONIZED CALCIUM: 1.06 MMOL/L (ref 1.15–1.33)
POC IONIZED CALCIUM: 1.13 MMOL/L (ref 1.15–1.33)
POC LACTIC ACID: 2.08 MMOL/L (ref 0.56–1.39)
POC LACTIC ACID: 4.17 MMOL/L (ref 0.56–1.39)
POC LACTIC ACID: >20 MMOL/L (ref 0.56–1.39)
POC LACTIC ACID: >20 MMOL/L (ref 0.56–1.39)
POC O2 SATURATION: 100 % (ref 94–98)
POC O2 SATURATION: 85 % (ref 94–98)
POC O2 SATURATION: 97 % (ref 94–98)
POC O2 SATURATION: 97 % (ref 94–98)
POC O2 SATURATION: 99 % (ref 94–98)
POC O2 SATURATION: 99 % (ref 94–98)
POC PCO2 TEMP: 25 MM HG
POC PCO2 TEMP: 34 MM HG
POC PCO2 TEMP: 34 MM HG
POC PCO2 TEMP: 37 MM HG
POC PCO2 TEMP: ABNORMAL MM HG
POC PCO2 TEMP: NORMAL MM HG
POC PCO2: 29.8 MM HG (ref 35–48)
POC PCO2: 30.7 MM HG (ref 35–48)
POC PCO2: 31.3 MM HG (ref 35–48)
POC PCO2: 32.8 MM HG (ref 35–48)
POC PCO2: 36 MM HG (ref 35–48)
POC PCO2: 36.8 MM HG (ref 35–48)
POC PCO2: 40.4 MM HG (ref 35–48)
POC PCO2: 42 MM HG (ref 35–48)
POC PCO2: 46.9 MM HG (ref 35–48)
POC PCO2: 50.3 MM HG (ref 35–48)
POC PCO2: 51.9 MM HG (ref 35–48)
POC PCO2: 53.4 MM HG (ref 35–48)
POC PCO2: 85.8 MM HG (ref 35–48)
POC PH TEMP: 7.2
POC PH TEMP: 7.37
POC PH TEMP: 7.46
POC PH TEMP: 7.49
POC PH TEMP: ABNORMAL
POC PH TEMP: NORMAL
POC PH: 6.51 (ref 7.35–7.45)
POC PH: 6.94 (ref 7.35–7.45)
POC PH: 7.11 (ref 7.35–7.45)
POC PH: 7.17 (ref 7.35–7.45)
POC PH: 7.3 (ref 7.35–7.45)
POC PH: 7.33 (ref 7.35–7.45)
POC PH: 7.4 (ref 7.35–7.45)
POC PH: 7.42 (ref 7.35–7.45)
POC PH: 7.43 (ref 7.35–7.45)
POC PH: 7.43 (ref 7.35–7.45)
POC PH: 7.44 (ref 7.35–7.45)
POC PH: 7.44 (ref 7.35–7.45)
POC PH: 7.46 (ref 7.35–7.45)
POC PO2 TEMP: 141 MM HG
POC PO2 TEMP: 172 MM HG
POC PO2 TEMP: 352 MM HG
POC PO2 TEMP: 37 MM HG
POC PO2 TEMP: ABNORMAL MM HG
POC PO2 TEMP: NORMAL MM HG
POC PO2: 110.8 MM HG (ref 83–108)
POC PO2: 137.4 MM HG (ref 83–108)
POC PO2: 166.2 MM HG (ref 83–108)
POC PO2: 186.4 MM HG (ref 83–108)
POC PO2: 231.3 MM HG (ref 83–108)
POC PO2: 235.9 MM HG (ref 83–108)
POC PO2: 314.3 MM HG (ref 83–108)
POC PO2: 375.5 MM HG (ref 83–108)
POC PO2: 390.5 MM HG (ref 83–108)
POC PO2: 448.5 MM HG (ref 83–108)
POC PO2: 483.6 MM HG (ref 83–108)
POC PO2: 49.7 MM HG (ref 83–108)
POC PO2: 90.2 MM HG (ref 83–108)
POC POTASSIUM: 3 MMOL/L (ref 3.5–4.5)
POC POTASSIUM: 3.3 MMOL/L (ref 3.5–4.5)
POC POTASSIUM: 4.1 MMOL/L (ref 3.5–4.5)
POC POTASSIUM: 5 MMOL/L (ref 3.5–4.5)
POC SODIUM: 130 MMOL/L (ref 138–146)
POC SODIUM: 137 MMOL/L (ref 138–146)
POC SODIUM: 140 MMOL/L (ref 138–146)
POC SODIUM: 142 MMOL/L (ref 138–146)
POSITIVE BASE EXCESS, ART: 0 (ref 0–3)
POSITIVE BASE EXCESS, ART: 1 (ref 0–3)
POSITIVE BASE EXCESS, ART: ABNORMAL (ref 0–3)
POSITIVE BASE EXCESS, ART: NORMAL (ref 0–3)
POSITIVE BASE EXCESS, VEN: 1 (ref 0–3)
POTASSIUM SERPL-SCNC: 3.3 MMOL/L (ref 3.7–5.3)
POTASSIUM SERPL-SCNC: 3.6 MMOL/L (ref 3.7–5.3)
POTASSIUM SERPL-SCNC: 3.7 MMOL/L (ref 3.7–5.3)
POTASSIUM SERPL-SCNC: 3.8 MMOL/L (ref 3.7–5.3)
POTASSIUM SERPL-SCNC: 3.8 MMOL/L (ref 3.7–5.3)
POTASSIUM SERPL-SCNC: 3.9 MMOL/L (ref 3.7–5.3)
POTASSIUM SERPL-SCNC: 4 MMOL/L (ref 3.7–5.3)
POTASSIUM SERPL-SCNC: 4.1 MMOL/L (ref 3.7–5.3)
POTASSIUM SERPL-SCNC: 4.1 MMOL/L (ref 3.7–5.3)
POTASSIUM SERPL-SCNC: 4.4 MMOL/L (ref 3.7–5.3)
POTASSIUM SERPL-SCNC: 4.7 MMOL/L (ref 3.7–5.3)
POTASSIUM SERPL-SCNC: 4.8 MMOL/L (ref 3.7–5.3)
POTASSIUM SERPL-SCNC: 4.8 MMOL/L (ref 3.7–5.3)
POTASSIUM SERPL-SCNC: 4.9 MMOL/L (ref 3.7–5.3)
POTASSIUM SERPL-SCNC: 5 MMOL/L (ref 3.7–5.3)
PRO-BNP: 93 PG/ML
PROPOXYPHENE, URINE: ABNORMAL
PROTEIN UA: ABNORMAL
PROTEIN UA: ABNORMAL
PROTHROMBIN TIME: 13.3 SEC (ref 9–12)
PROTHROMBIN TIME: 14.5 SEC (ref 9–12)
RBC # BLD: 3.91 M/UL (ref 3.95–5.11)
RBC # BLD: 3.92 M/UL (ref 3.95–5.11)
RBC # BLD: 4.04 M/UL (ref 3.95–5.11)
RBC # BLD: 4.51 M/UL (ref 3.95–5.11)
RBC # BLD: 4.64 M/UL (ref 3.95–5.11)
RBC # BLD: ABNORMAL 10*6/UL
RBC UA: ABNORMAL /HPF (ref 0–4)
RBC UA: NORMAL /HPF (ref 0–4)
READ BACK: YES
RENAL EPITHELIAL, UA: ABNORMAL /HPF
RENAL EPITHELIAL, UA: NORMAL /HPF
SAMPLE SITE: ABNORMAL
SAMPLE SITE: NORMAL
SEG NEUTROPHILS: 58 % (ref 36–66)
SEG NEUTROPHILS: 77 % (ref 36–65)
SEG NEUTROPHILS: 83 % (ref 36–65)
SEG NEUTROPHILS: 90 % (ref 36–66)
SEG NEUTROPHILS: 92 % (ref 36–66)
SEGMENTED NEUTROPHILS ABSOLUTE COUNT: 11.97 K/UL (ref 1.8–7.7)
SEGMENTED NEUTROPHILS ABSOLUTE COUNT: 13.29 K/UL (ref 1.8–7.7)
SEGMENTED NEUTROPHILS ABSOLUTE COUNT: 4.75 K/UL (ref 1.5–8.1)
SEGMENTED NEUTROPHILS ABSOLUTE COUNT: 6.34 K/UL (ref 1.8–7.7)
SEGMENTED NEUTROPHILS ABSOLUTE COUNT: 9.45 K/UL (ref 1.5–8.1)
SODIUM BLD-SCNC: 130 MMOL/L (ref 135–144)
SODIUM BLD-SCNC: 130 MMOL/L (ref 135–144)
SODIUM BLD-SCNC: 131 MMOL/L (ref 135–144)
SODIUM BLD-SCNC: 132 MMOL/L (ref 135–144)
SODIUM BLD-SCNC: 133 MMOL/L (ref 135–144)
SODIUM BLD-SCNC: 134 MMOL/L (ref 135–144)
SODIUM BLD-SCNC: 135 MMOL/L (ref 135–144)
SODIUM BLD-SCNC: 136 MMOL/L (ref 135–144)
SODIUM BLD-SCNC: 136 MMOL/L (ref 135–144)
SODIUM BLD-SCNC: 139 MMOL/L (ref 135–144)
SODIUM BLD-SCNC: 140 MMOL/L (ref 135–144)
SODIUM BLD-SCNC: 140 MMOL/L (ref 135–144)
SODIUM BLD-SCNC: 142 MMOL/L (ref 135–144)
SODIUM BLD-SCNC: 142 MMOL/L (ref 135–144)
SODIUM BLD-SCNC: 147 MMOL/L (ref 135–144)
SPECIFIC GRAVITY UA: 1.01 (ref 1–1.03)
SPECIFIC GRAVITY UA: 1.01 (ref 1–1.03)
SPECIMEN DESCRIPTION: NORMAL
SPECIMEN DESCRIPTION: NORMAL
T4 TOTAL: 5 UG/DL (ref 4.5–12)
TCO2 (CALC), ART: 12 MMOL/L (ref 22–29)
TCO2 (CALC), ART: 16 MMOL/L (ref 22–29)
TCO2 (CALC), ART: 21 MMOL/L (ref 22–29)
TCO2 (CALC), ART: 22 MMOL/L (ref 22–29)
TCO2 (CALC), ART: 22 MMOL/L (ref 22–29)
TCO2 (CALC), ART: 23 MMOL/L (ref 22–29)
TCO2 (CALC), ART: 24 MMOL/L (ref 22–29)
TCO2 (CALC), ART: 25 MMOL/L (ref 22–29)
TCO2 (CALC), ART: 26 MMOL/L (ref 22–29)
TCO2 (CALC), ART: 26 MMOL/L (ref 22–29)
TCO2 (CALC), ART: 27 MMOL/L (ref 22–29)
TCO2 (CALC), ART: 34 MMOL/L (ref 22–29)
TCO2 (CALC), ART: 6 MMOL/L (ref 22–29)
TEST INFORMATION: ABNORMAL
TOTAL CK: 182 U/L (ref 26–192)
TOTAL CK: 394 U/L (ref 26–192)
TOTAL CO2, VENOUS: 26 MMOL/L (ref 23–30)
TOTAL PROTEIN: 4.6 G/DL (ref 6.4–8.3)
TOTAL PROTEIN: 4.9 G/DL (ref 6.4–8.3)
TOTAL PROTEIN: 6.2 G/DL (ref 6.4–8.3)
TOTAL PROTEIN: 6.3 G/DL (ref 6.4–8.3)
TRICHOMONAS: ABNORMAL
TRICHOMONAS: NORMAL
TRICYCLIC ANTIDEPRESSANTS, UR: ABNORMAL
TROPONIN INTERP: ABNORMAL
TROPONIN T: ABNORMAL NG/ML
TROPONIN, HIGH SENSITIVITY: 65 NG/L (ref 0–14)
TSH SERPL DL<=0.05 MIU/L-ACNC: 13.52 MIU/L (ref 0.3–5)
TURBIDITY: ABNORMAL
TURBIDITY: ABNORMAL
URINE HGB: ABNORMAL
URINE HGB: ABNORMAL
UROBILINOGEN, URINE: NORMAL
UROBILINOGEN, URINE: NORMAL
VITAMIN B-12: 1329 PG/ML (ref 232–1245)
WBC # BLD: 12.3 K/UL (ref 3.5–11.3)
WBC # BLD: 13.3 K/UL (ref 3.5–11.3)
WBC # BLD: 22.9 K/UL (ref 3.5–11.3)
WBC # BLD: 5.7 K/UL (ref 3.5–11.3)
WBC # BLD: 6.9 K/UL (ref 3.5–11.3)
WBC # BLD: ABNORMAL 10*3/UL
WBC UA: ABNORMAL /HPF (ref 0–5)
WBC UA: NORMAL /HPF (ref 0–5)
YEAST: ABNORMAL
YEAST: NORMAL

## 2019-01-01 PROCEDURE — 85025 COMPLETE CBC W/AUTO DIFF WBC: CPT

## 2019-01-01 PROCEDURE — 95951 PR EEG MONITORING/VIDEORECORD: CPT | Performed by: PSYCHIATRY & NEUROLOGY

## 2019-01-01 PROCEDURE — 84132 ASSAY OF SERUM POTASSIUM: CPT

## 2019-01-01 PROCEDURE — 2700000000 HC OXYGEN THERAPY PER DAY

## 2019-01-01 PROCEDURE — 2500000003 HC RX 250 WO HCPCS: Performed by: STUDENT IN AN ORGANIZED HEALTH CARE EDUCATION/TRAINING PROGRAM

## 2019-01-01 PROCEDURE — 82947 ASSAY GLUCOSE BLOOD QUANT: CPT

## 2019-01-01 PROCEDURE — C1760 CLOSURE DEV, VASC: HCPCS

## 2019-01-01 PROCEDURE — 85014 HEMATOCRIT: CPT

## 2019-01-01 PROCEDURE — 93005 ELECTROCARDIOGRAM TRACING: CPT

## 2019-01-01 PROCEDURE — 95951 HC EEG MONITORING VIDEO RECORDING: CPT

## 2019-01-01 PROCEDURE — G8419 CALC BMI OUT NRM PARAM NOF/U: HCPCS | Performed by: PHYSICIAN ASSISTANT

## 2019-01-01 PROCEDURE — 6360000002 HC RX W HCPCS: Performed by: EMERGENCY MEDICINE

## 2019-01-01 PROCEDURE — 96374 THER/PROPH/DIAG INJ IV PUSH: CPT

## 2019-01-01 PROCEDURE — 6360000002 HC RX W HCPCS: Performed by: INTERNAL MEDICINE

## 2019-01-01 PROCEDURE — 82565 ASSAY OF CREATININE: CPT

## 2019-01-01 PROCEDURE — 6360000002 HC RX W HCPCS: Performed by: STUDENT IN AN ORGANIZED HEALTH CARE EDUCATION/TRAINING PROGRAM

## 2019-01-01 PROCEDURE — 82435 ASSAY OF BLOOD CHLORIDE: CPT

## 2019-01-01 PROCEDURE — 2000000000 HC ICU R&B

## 2019-01-01 PROCEDURE — 2500000003 HC RX 250 WO HCPCS: Performed by: EMERGENCY MEDICINE

## 2019-01-01 PROCEDURE — 99213 OFFICE O/P EST LOW 20 MIN: CPT | Performed by: PHYSICIAN ASSISTANT

## 2019-01-01 PROCEDURE — 81025 URINE PREGNANCY TEST: CPT

## 2019-01-01 PROCEDURE — 85379 FIBRIN DEGRADATION QUANT: CPT

## 2019-01-01 PROCEDURE — 71045 X-RAY EXAM CHEST 1 VIEW: CPT

## 2019-01-01 PROCEDURE — 83735 ASSAY OF MAGNESIUM: CPT

## 2019-01-01 PROCEDURE — 83690 ASSAY OF LIPASE: CPT

## 2019-01-01 PROCEDURE — P9047 ALBUMIN (HUMAN), 25%, 50ML: HCPCS | Performed by: SURGERY

## 2019-01-01 PROCEDURE — 99291 CRITICAL CARE FIRST HOUR: CPT | Performed by: INTERNAL MEDICINE

## 2019-01-01 PROCEDURE — 2580000003 HC RX 258: Performed by: INTERNAL MEDICINE

## 2019-01-01 PROCEDURE — 31500 INSERT EMERGENCY AIRWAY: CPT

## 2019-01-01 PROCEDURE — G8484 FLU IMMUNIZE NO ADMIN: HCPCS | Performed by: PHYSICIAN ASSISTANT

## 2019-01-01 PROCEDURE — 84295 ASSAY OF SERUM SODIUM: CPT

## 2019-01-01 PROCEDURE — 2580000003 HC RX 258: Performed by: STUDENT IN AN ORGANIZED HEALTH CARE EDUCATION/TRAINING PROGRAM

## 2019-01-01 PROCEDURE — 86901 BLOOD TYPING SEROLOGIC RH(D): CPT

## 2019-01-01 PROCEDURE — 82550 ASSAY OF CK (CPK): CPT

## 2019-01-01 PROCEDURE — 36415 COLL VENOUS BLD VENIPUNCTURE: CPT

## 2019-01-01 PROCEDURE — 81001 URINALYSIS AUTO W/SCOPE: CPT

## 2019-01-01 PROCEDURE — 85610 PROTHROMBIN TIME: CPT

## 2019-01-01 PROCEDURE — 82803 BLOOD GASES ANY COMBINATION: CPT

## 2019-01-01 PROCEDURE — 84100 ASSAY OF PHOSPHORUS: CPT

## 2019-01-01 PROCEDURE — 6370000000 HC RX 637 (ALT 250 FOR IP): Performed by: EMERGENCY MEDICINE

## 2019-01-01 PROCEDURE — 94770 HC ETCO2 MONITOR DAILY: CPT

## 2019-01-01 PROCEDURE — 0BH18EZ INSERTION OF ENDOTRACHEAL AIRWAY INTO TRACHEA, VIA NATURAL OR ARTIFICIAL OPENING ENDOSCOPIC: ICD-10-PCS | Performed by: EMERGENCY MEDICINE

## 2019-01-01 PROCEDURE — 84484 ASSAY OF TROPONIN QUANT: CPT

## 2019-01-01 PROCEDURE — 92950 HEART/LUNG RESUSCITATION CPR: CPT

## 2019-01-01 PROCEDURE — 80076 HEPATIC FUNCTION PANEL: CPT

## 2019-01-01 PROCEDURE — 37799 UNLISTED PX VASCULAR SURGERY: CPT

## 2019-01-01 PROCEDURE — 93458 L HRT ARTERY/VENTRICLE ANGIO: CPT | Performed by: INTERNAL MEDICINE

## 2019-01-01 PROCEDURE — 6370000000 HC RX 637 (ALT 250 FOR IP): Performed by: STUDENT IN AN ORGANIZED HEALTH CARE EDUCATION/TRAINING PROGRAM

## 2019-01-01 PROCEDURE — 82746 ASSAY OF FOLIC ACID SERUM: CPT

## 2019-01-01 PROCEDURE — 82330 ASSAY OF CALCIUM: CPT

## 2019-01-01 PROCEDURE — 99233 SBSQ HOSP IP/OBS HIGH 50: CPT | Performed by: STUDENT IN AN ORGANIZED HEALTH CARE EDUCATION/TRAINING PROGRAM

## 2019-01-01 PROCEDURE — 80048 BASIC METABOLIC PNL TOTAL CA: CPT

## 2019-01-01 PROCEDURE — 99282 EMERGENCY DEPT VISIT SF MDM: CPT

## 2019-01-01 PROCEDURE — 6360000002 HC RX W HCPCS: Performed by: SURGERY

## 2019-01-01 PROCEDURE — 94761 N-INVAS EAR/PLS OXIMETRY MLT: CPT

## 2019-01-01 PROCEDURE — 51703 INSERT BLADDER CATH COMPLEX: CPT

## 2019-01-01 PROCEDURE — 94003 VENT MGMT INPAT SUBQ DAY: CPT

## 2019-01-01 PROCEDURE — 2720000010 HC SURG SUPPLY STERILE

## 2019-01-01 PROCEDURE — 84703 CHORIONIC GONADOTROPIN ASSAY: CPT

## 2019-01-01 PROCEDURE — 82607 VITAMIN B-12: CPT

## 2019-01-01 PROCEDURE — 85730 THROMBOPLASTIN TIME PARTIAL: CPT

## 2019-01-01 PROCEDURE — 80053 COMPREHEN METABOLIC PANEL: CPT

## 2019-01-01 PROCEDURE — 94640 AIRWAY INHALATION TREATMENT: CPT

## 2019-01-01 PROCEDURE — C1751 CATH, INF, PER/CENT/MIDLINE: HCPCS

## 2019-01-01 PROCEDURE — 85384 FIBRINOGEN ACTIVITY: CPT

## 2019-01-01 PROCEDURE — 5A1955Z RESPIRATORY VENTILATION, GREATER THAN 96 CONSECUTIVE HOURS: ICD-10-PCS | Performed by: INTERNAL MEDICINE

## 2019-01-01 PROCEDURE — 2580000003 HC RX 258: Performed by: EMERGENCY MEDICINE

## 2019-01-01 PROCEDURE — 99283 EMERGENCY DEPT VISIT LOW MDM: CPT

## 2019-01-01 PROCEDURE — C1894 INTRO/SHEATH, NON-LASER: HCPCS

## 2019-01-01 PROCEDURE — 36600 WITHDRAWAL OF ARTERIAL BLOOD: CPT

## 2019-01-01 PROCEDURE — 4A023N7 MEASUREMENT OF CARDIAC SAMPLING AND PRESSURE, LEFT HEART, PERCUTANEOUS APPROACH: ICD-10-PCS | Performed by: INTERNAL MEDICINE

## 2019-01-01 PROCEDURE — 93005 ELECTROCARDIOGRAM TRACING: CPT | Performed by: PHYSICIAN ASSISTANT

## 2019-01-01 PROCEDURE — 2580000003 HC RX 258: Performed by: SURGERY

## 2019-01-01 PROCEDURE — 87641 MR-STAPH DNA AMP PROBE: CPT

## 2019-01-01 PROCEDURE — 70450 CT HEAD/BRAIN W/O DYE: CPT

## 2019-01-01 PROCEDURE — 96375 TX/PRO/DX INJ NEW DRUG ADDON: CPT

## 2019-01-01 PROCEDURE — 94002 VENT MGMT INPAT INIT DAY: CPT

## 2019-01-01 PROCEDURE — 93306 TTE W/DOPPLER COMPLETE: CPT

## 2019-01-01 PROCEDURE — 87040 BLOOD CULTURE FOR BACTERIA: CPT

## 2019-01-01 PROCEDURE — 87086 URINE CULTURE/COLONY COUNT: CPT

## 2019-01-01 PROCEDURE — 80307 DRUG TEST PRSMV CHEM ANLYZR: CPT

## 2019-01-01 PROCEDURE — 99203 OFFICE O/P NEW LOW 30 MIN: CPT | Performed by: ORTHOPAEDIC SURGERY

## 2019-01-01 PROCEDURE — 99254 IP/OBS CNSLTJ NEW/EST MOD 60: CPT | Performed by: STUDENT IN AN ORGANIZED HEALTH CARE EDUCATION/TRAINING PROGRAM

## 2019-01-01 PROCEDURE — 86900 BLOOD TYPING SEROLOGIC ABO: CPT

## 2019-01-01 PROCEDURE — 99285 EMERGENCY DEPT VISIT HI MDM: CPT

## 2019-01-01 PROCEDURE — 83874 ASSAY OF MYOGLOBIN: CPT

## 2019-01-01 PROCEDURE — 86850 RBC ANTIBODY SCREEN: CPT

## 2019-01-01 PROCEDURE — 86920 COMPATIBILITY TEST SPIN: CPT

## 2019-01-01 PROCEDURE — C1769 GUIDE WIRE: HCPCS

## 2019-01-01 PROCEDURE — 83880 ASSAY OF NATRIURETIC PEPTIDE: CPT

## 2019-01-01 PROCEDURE — 4004F PT TOBACCO SCREEN RCVD TLK: CPT | Performed by: PHYSICIAN ASSISTANT

## 2019-01-01 PROCEDURE — 84436 ASSAY OF TOTAL THYROXINE: CPT

## 2019-01-01 PROCEDURE — 99284 EMERGENCY DEPT VISIT MOD MDM: CPT

## 2019-01-01 PROCEDURE — 83605 ASSAY OF LACTIC ACID: CPT

## 2019-01-01 PROCEDURE — 84443 ASSAY THYROID STIM HORMONE: CPT

## 2019-01-01 PROCEDURE — 73562 X-RAY EXAM OF KNEE 3: CPT

## 2019-01-01 PROCEDURE — 74177 CT ABD & PELVIS W/CONTRAST: CPT

## 2019-01-01 PROCEDURE — B2151ZZ FLUOROSCOPY OF LEFT HEART USING LOW OSMOLAR CONTRAST: ICD-10-PCS | Performed by: INTERNAL MEDICINE

## 2019-01-01 PROCEDURE — 2709999900 HC NON-CHARGEABLE SUPPLY

## 2019-01-01 PROCEDURE — 6360000004 HC RX CONTRAST MEDICATION: Performed by: EMERGENCY MEDICINE

## 2019-01-01 PROCEDURE — 2500000003 HC RX 250 WO HCPCS: Performed by: SURGERY

## 2019-01-01 PROCEDURE — G8427 DOCREV CUR MEDS BY ELIG CLIN: HCPCS | Performed by: PHYSICIAN ASSISTANT

## 2019-01-01 PROCEDURE — 86403 PARTICLE AGGLUT ANTBDY SCRN: CPT

## 2019-01-01 PROCEDURE — G8431 POS CLIN DEPRES SCRN F/U DOC: HCPCS | Performed by: PHYSICIAN ASSISTANT

## 2019-01-01 PROCEDURE — B2111ZZ FLUOROSCOPY OF MULTIPLE CORONARY ARTERIES USING LOW OSMOLAR CONTRAST: ICD-10-PCS | Performed by: INTERNAL MEDICINE

## 2019-01-01 PROCEDURE — 36556 INSERT NON-TUNNEL CV CATH: CPT | Performed by: INTERNAL MEDICINE

## 2019-01-01 RX ORDER — MAGNESIUM SULFATE 1 G/100ML
1 INJECTION INTRAVENOUS EVERY 6 HOURS PRN
Status: DISCONTINUED | OUTPATIENT
Start: 2019-01-01 | End: 2019-12-13 | Stop reason: HOSPADM

## 2019-01-01 RX ORDER — LEVETIRACETAM 10 MG/ML
1000 INJECTION INTRAVASCULAR EVERY 12 HOURS
Status: DISCONTINUED | OUTPATIENT
Start: 2019-01-01 | End: 2019-01-01

## 2019-01-01 RX ORDER — PROMETHAZINE HYDROCHLORIDE 25 MG/ML
12.5 INJECTION, SOLUTION INTRAMUSCULAR; INTRAVENOUS ONCE
Status: COMPLETED | OUTPATIENT
Start: 2019-01-01 | End: 2019-01-01

## 2019-01-01 RX ORDER — LEVETIRACETAM 5 MG/ML
500 INJECTION INTRAVASCULAR EVERY 12 HOURS
Status: DISCONTINUED | OUTPATIENT
Start: 2019-01-01 | End: 2019-01-01

## 2019-01-01 RX ORDER — FLUOXETINE HYDROCHLORIDE 40 MG/1
40 CAPSULE ORAL DAILY
Qty: 30 CAPSULE | Refills: 0 | Status: SHIPPED | OUTPATIENT
Start: 2019-01-01

## 2019-01-01 RX ORDER — SODIUM CHLORIDE 450 MG/100ML
INJECTION, SOLUTION INTRAVENOUS CONTINUOUS
Status: DISCONTINUED | OUTPATIENT
Start: 2019-01-01 | End: 2019-01-01

## 2019-01-01 RX ORDER — ONDANSETRON 2 MG/ML
4 INJECTION INTRAMUSCULAR; INTRAVENOUS EVERY 6 HOURS PRN
Status: DISCONTINUED | OUTPATIENT
Start: 2019-01-01 | End: 2019-01-01

## 2019-01-01 RX ORDER — SODIUM CHLORIDE 0.9 % (FLUSH) 0.9 %
10 SYRINGE (ML) INJECTION PRN
Status: DISCONTINUED | OUTPATIENT
Start: 2019-01-01 | End: 2019-12-13 | Stop reason: HOSPADM

## 2019-01-01 RX ORDER — SODIUM CHLORIDE 9 MG/ML
INJECTION, SOLUTION INTRAVENOUS CONTINUOUS
Status: DISCONTINUED | OUTPATIENT
Start: 2019-01-01 | End: 2019-12-13 | Stop reason: HOSPADM

## 2019-01-01 RX ORDER — ONDANSETRON 4 MG/1
4 TABLET, FILM COATED ORAL EVERY 12 HOURS PRN
Qty: 4 TABLET | Refills: 0 | Status: SHIPPED | OUTPATIENT
Start: 2019-01-01 | End: 2019-01-01 | Stop reason: ALTCHOICE

## 2019-01-01 RX ORDER — MAGNESIUM SULFATE 1 G/100ML
1 INJECTION INTRAVENOUS ONCE
Status: COMPLETED | OUTPATIENT
Start: 2019-01-01 | End: 2019-01-01

## 2019-01-01 RX ORDER — METHYLPREDNISOLONE 4 MG/1
TABLET ORAL
Qty: 1 KIT | Refills: 0 | Status: SHIPPED | OUTPATIENT
Start: 2019-01-01 | End: 2019-01-01

## 2019-01-01 RX ORDER — 0.9 % SODIUM CHLORIDE 0.9 %
1000 INTRAVENOUS SOLUTION INTRAVENOUS ONCE
Status: COMPLETED | OUTPATIENT
Start: 2019-01-01 | End: 2019-01-01

## 2019-01-01 RX ORDER — CHLORHEXIDINE GLUCONATE 0.12 MG/ML
15 RINSE ORAL 2 TIMES DAILY
Status: DISCONTINUED | OUTPATIENT
Start: 2019-01-01 | End: 2019-01-01

## 2019-01-01 RX ORDER — ONDANSETRON 2 MG/ML
4 INJECTION INTRAMUSCULAR; INTRAVENOUS ONCE
Status: COMPLETED | OUTPATIENT
Start: 2019-01-01 | End: 2019-01-01

## 2019-01-01 RX ORDER — ACETAMINOPHEN 325 MG/1
650 TABLET ORAL EVERY 4 HOURS PRN
Status: DISCONTINUED | OUTPATIENT
Start: 2019-01-01 | End: 2019-01-01

## 2019-01-01 RX ORDER — SODIUM CHLORIDE 0.9 % (FLUSH) 0.9 %
10 SYRINGE (ML) INJECTION EVERY 12 HOURS SCHEDULED
Status: DISCONTINUED | OUTPATIENT
Start: 2019-01-01 | End: 2019-12-13 | Stop reason: HOSPADM

## 2019-01-01 RX ORDER — HYDROXYZINE PAMOATE 25 MG/1
25 CAPSULE ORAL 3 TIMES DAILY PRN
COMMUNITY
End: 2019-01-01

## 2019-01-01 RX ORDER — LEVOTHYROXINE SODIUM ANHYDROUS 200 UG/5ML
20 INJECTION, POWDER, LYOPHILIZED, FOR SOLUTION INTRAVENOUS ONCE
Status: COMPLETED | OUTPATIENT
Start: 2019-01-01 | End: 2019-12-12

## 2019-01-01 RX ORDER — MORPHINE SULFATE 4 MG/ML
6 INJECTION, SOLUTION INTRAMUSCULAR; INTRAVENOUS ONCE
Status: COMPLETED | OUTPATIENT
Start: 2019-01-01 | End: 2019-01-01

## 2019-01-01 RX ORDER — KETOROLAC TROMETHAMINE 30 MG/ML
30 INJECTION, SOLUTION INTRAMUSCULAR; INTRAVENOUS ONCE
Status: COMPLETED | OUTPATIENT
Start: 2019-01-01 | End: 2019-01-01

## 2019-01-01 RX ORDER — HYDROXYZINE PAMOATE 25 MG/1
CAPSULE ORAL
Qty: 30 CAPSULE | Refills: 0 | Status: SHIPPED | OUTPATIENT
Start: 2019-01-01

## 2019-01-01 RX ORDER — ALBUTEROL SULFATE 2.5 MG/3ML
2.5 SOLUTION RESPIRATORY (INHALATION) EVERY 4 HOURS
Status: DISCONTINUED | OUTPATIENT
Start: 2019-01-01 | End: 2019-12-13 | Stop reason: HOSPADM

## 2019-01-01 RX ORDER — MINERAL OIL AND WHITE PETROLATUM 150; 830 MG/G; MG/G
OINTMENT OPHTHALMIC EVERY 4 HOURS
Status: DISCONTINUED | OUTPATIENT
Start: 2019-01-01 | End: 2019-01-01

## 2019-01-01 RX ORDER — HEPARIN SODIUM 5000 [USP'U]/ML
5000 INJECTION, SOLUTION INTRAVENOUS; SUBCUTANEOUS EVERY 8 HOURS SCHEDULED
Status: DISCONTINUED | OUTPATIENT
Start: 2019-01-01 | End: 2019-01-01

## 2019-01-01 RX ORDER — ALBUMIN (HUMAN) 12.5 G/50ML
25 SOLUTION INTRAVENOUS ONCE
Status: COMPLETED | OUTPATIENT
Start: 2019-01-01 | End: 2019-01-01

## 2019-01-01 RX ORDER — ONDANSETRON 4 MG/1
4 TABLET, FILM COATED ORAL EVERY 12 HOURS PRN
Qty: 4 TABLET | Refills: 0 | Status: SHIPPED | OUTPATIENT
Start: 2019-01-01 | End: 2019-01-01 | Stop reason: SDUPTHER

## 2019-01-01 RX ORDER — TRAMADOL HYDROCHLORIDE 50 MG/1
50 TABLET ORAL ONCE
Status: COMPLETED | OUTPATIENT
Start: 2019-01-01 | End: 2019-01-01

## 2019-01-01 RX ORDER — POTASSIUM CHLORIDE 29.8 MG/ML
20 INJECTION INTRAVENOUS PRN
Status: DISCONTINUED | OUTPATIENT
Start: 2019-01-01 | End: 2019-12-13 | Stop reason: HOSPADM

## 2019-01-01 RX ORDER — LEVOFLOXACIN 5 MG/ML
750 INJECTION, SOLUTION INTRAVENOUS EVERY 6 HOURS
Status: DISCONTINUED | OUTPATIENT
Start: 2019-01-01 | End: 2019-12-13 | Stop reason: HOSPADM

## 2019-01-01 RX ORDER — CALCIUM GLUCONATE 94 MG/ML
2 INJECTION, SOLUTION INTRAVENOUS ONCE
Status: DISCONTINUED | OUTPATIENT
Start: 2019-01-01 | End: 2019-01-01

## 2019-01-01 RX ORDER — LIDOCAINE 50 MG/G
OINTMENT TOPICAL
Qty: 50 G | Refills: 0 | Status: SHIPPED | OUTPATIENT
Start: 2019-01-01

## 2019-01-01 RX ADMIN — SODIUM CHLORIDE: 9 INJECTION, SOLUTION INTRAVENOUS at 09:52

## 2019-01-01 RX ADMIN — MAGNESIUM SULFATE HEPTAHYDRATE 1 G: 1 INJECTION, SOLUTION INTRAVENOUS at 13:44

## 2019-01-01 RX ADMIN — SODIUM CHLORIDE 1000 ML: 9 INJECTION, SOLUTION INTRAVENOUS at 02:44

## 2019-01-01 RX ADMIN — CALCIUM GLUCONATE 2 G: 98 INJECTION, SOLUTION INTRAVENOUS at 14:29

## 2019-01-01 RX ADMIN — SODIUM CHLORIDE 500 ML: 4.5 INJECTION, SOLUTION INTRAVENOUS at 05:41

## 2019-01-01 RX ADMIN — TRAMADOL HYDROCHLORIDE 50 MG: 50 TABLET, FILM COATED ORAL at 02:57

## 2019-01-01 RX ADMIN — Medication 10 ML: at 21:13

## 2019-01-01 RX ADMIN — LEVETIRACETAM 500 MG: 5 INJECTION INTRAVENOUS at 02:22

## 2019-01-01 RX ADMIN — Medication 2 MCG/MIN: at 09:40

## 2019-01-01 RX ADMIN — AMIODARONE HYDROCHLORIDE 1 MG/MIN: 50 INJECTION, SOLUTION INTRAVENOUS at 11:50

## 2019-01-01 RX ADMIN — AMIODARONE HYDROCHLORIDE 0.5 MG/MIN: 50 INJECTION, SOLUTION INTRAVENOUS at 19:30

## 2019-01-01 RX ADMIN — FAMOTIDINE 20 MG: 10 INJECTION, SOLUTION INTRAVENOUS at 08:19

## 2019-01-01 RX ADMIN — IOPAMIDOL 75 ML: 755 INJECTION, SOLUTION INTRAVENOUS at 03:50

## 2019-01-01 RX ADMIN — CALCIUM GLUCONATE 2 G: 98 INJECTION, SOLUTION INTRAVENOUS at 07:35

## 2019-01-01 RX ADMIN — Medication: at 00:28

## 2019-01-01 RX ADMIN — HEPARIN SODIUM 5000 UNITS: 5000 INJECTION INTRAVENOUS; SUBCUTANEOUS at 22:03

## 2019-01-01 RX ADMIN — MINERAL OIL, PETROLATUM: 425; 573 OINTMENT OPHTHALMIC at 19:41

## 2019-01-01 RX ADMIN — FAMOTIDINE 20 MG: 10 INJECTION, SOLUTION INTRAVENOUS at 10:00

## 2019-01-01 RX ADMIN — CALCIUM GLUCONATE 2 G: 98 INJECTION, SOLUTION INTRAVENOUS at 19:45

## 2019-01-01 RX ADMIN — SODIUM CHLORIDE: 4.5 INJECTION, SOLUTION INTRAVENOUS at 00:38

## 2019-01-01 RX ADMIN — SODIUM CHLORIDE 1000 ML: 4.5 INJECTION, SOLUTION INTRAVENOUS at 09:34

## 2019-01-01 RX ADMIN — VASOPRESSIN 0.01 UNITS/MIN: 20 INJECTION INTRAVENOUS at 22:11

## 2019-01-01 RX ADMIN — LEVOFLOXACIN 750 MG: 5 INJECTION, SOLUTION INTRAVENOUS at 20:25

## 2019-01-01 RX ADMIN — FAMOTIDINE 20 MG: 10 INJECTION, SOLUTION INTRAVENOUS at 20:28

## 2019-01-01 RX ADMIN — Medication 50 MEQ: at 05:16

## 2019-01-01 RX ADMIN — FAMOTIDINE 20 MG: 10 INJECTION, SOLUTION INTRAVENOUS at 21:13

## 2019-01-01 RX ADMIN — MINERAL OIL, PETROLATUM: 425; 573 OINTMENT OPHTHALMIC at 00:28

## 2019-01-01 RX ADMIN — Medication 28 MCG/MIN: at 05:35

## 2019-01-01 RX ADMIN — HEPARIN SODIUM 5000 UNITS: 5000 INJECTION INTRAVENOUS; SUBCUTANEOUS at 14:12

## 2019-01-01 RX ADMIN — Medication 50 MEQ: at 05:18

## 2019-01-01 RX ADMIN — Medication 10 ML: at 20:29

## 2019-01-01 RX ADMIN — HEPARIN SODIUM 5000 UNITS: 5000 INJECTION INTRAVENOUS; SUBCUTANEOUS at 13:44

## 2019-01-01 RX ADMIN — LEVETIRACETAM 1000 MG: 10 INJECTION INTRAVENOUS at 14:50

## 2019-01-01 RX ADMIN — AMIODARONE HYDROCHLORIDE 0.5 MG/MIN: 50 INJECTION, SOLUTION INTRAVENOUS at 11:30

## 2019-01-01 RX ADMIN — MINERAL OIL, PETROLATUM: 425; 573 OINTMENT OPHTHALMIC at 11:55

## 2019-01-01 RX ADMIN — INSULIN LISPRO 3 UNITS: 100 INJECTION, SOLUTION INTRAVENOUS; SUBCUTANEOUS at 17:05

## 2019-01-01 RX ADMIN — HEPARIN SODIUM 5000 UNITS: 5000 INJECTION INTRAVENOUS; SUBCUTANEOUS at 06:02

## 2019-01-01 RX ADMIN — Medication 10 ML: at 09:32

## 2019-01-01 RX ADMIN — EPINEPHRINE 1 MG: 0.1 INJECTION INTRACARDIAC; INTRAVENOUS at 04:50

## 2019-01-01 RX ADMIN — LEVETIRACETAM 500 MG: 5 INJECTION INTRAVENOUS at 15:44

## 2019-01-01 RX ADMIN — Medication 10 ML: at 20:01

## 2019-01-01 RX ADMIN — Medication 10 ML: at 07:42

## 2019-01-01 RX ADMIN — Medication 10 ML: at 08:20

## 2019-01-01 RX ADMIN — SODIUM CHLORIDE: 4.5 INJECTION, SOLUTION INTRAVENOUS at 09:34

## 2019-01-01 RX ADMIN — AMIODARONE HYDROCHLORIDE 0.5 MG/MIN: 50 INJECTION, SOLUTION INTRAVENOUS at 02:30

## 2019-01-01 RX ADMIN — MINERAL OIL, PETROLATUM: 425; 573 OINTMENT OPHTHALMIC at 15:49

## 2019-01-01 RX ADMIN — HEPARIN SODIUM 5000 UNITS: 5000 INJECTION INTRAVENOUS; SUBCUTANEOUS at 22:01

## 2019-01-01 RX ADMIN — CHLORHEXIDINE GLUCONATE 0.12% ORAL RINSE 15 ML: 1.2 LIQUID ORAL at 21:13

## 2019-01-01 RX ADMIN — HEPARIN SODIUM 5000 UNITS: 5000 INJECTION INTRAVENOUS; SUBCUTANEOUS at 06:21

## 2019-01-01 RX ADMIN — MAGNESIUM SULFATE HEPTAHYDRATE 1 G: 1 INJECTION, SOLUTION INTRAVENOUS at 03:02

## 2019-01-01 RX ADMIN — CALCIUM GLUCONATE 2 G: 98 INJECTION, SOLUTION INTRAVENOUS at 20:17

## 2019-01-01 RX ADMIN — CALCIUM GLUCONATE 2 G: 98 INJECTION, SOLUTION INTRAVENOUS at 10:11

## 2019-01-01 RX ADMIN — AMIODARONE HYDROCHLORIDE 150 MG: 50 INJECTION, SOLUTION INTRAVENOUS at 11:33

## 2019-01-01 RX ADMIN — Medication: at 20:12

## 2019-01-01 RX ADMIN — LEVETIRACETAM 500 MG: 5 INJECTION INTRAVENOUS at 13:44

## 2019-01-01 RX ADMIN — POTASSIUM CHLORIDE 20 MEQ: 29.8 INJECTION, SOLUTION INTRAVENOUS at 11:24

## 2019-01-01 RX ADMIN — Medication: at 05:43

## 2019-01-01 RX ADMIN — AMIODARONE HYDROCHLORIDE 0.5 MG/MIN: 50 INJECTION, SOLUTION INTRAVENOUS at 17:36

## 2019-01-01 RX ADMIN — ALBUMIN (HUMAN) 25 G: 0.25 INJECTION, SOLUTION INTRAVENOUS at 22:23

## 2019-01-01 RX ADMIN — Medication 18 MCG/MIN: at 21:49

## 2019-01-01 RX ADMIN — POTASSIUM CHLORIDE 20 MEQ: 29.8 INJECTION, SOLUTION INTRAVENOUS at 12:32

## 2019-01-01 RX ADMIN — Medication: at 19:39

## 2019-01-01 RX ADMIN — CALCIUM GLUCONATE 1 G: 98 INJECTION, SOLUTION INTRAVENOUS at 03:02

## 2019-01-01 RX ADMIN — CALCIUM GLUCONATE 1 G: 98 INJECTION, SOLUTION INTRAVENOUS at 14:50

## 2019-01-01 RX ADMIN — LEVETIRACETAM 500 MG: 5 INJECTION INTRAVENOUS at 02:30

## 2019-01-01 RX ADMIN — ONDANSETRON 4 MG: 2 INJECTION INTRAMUSCULAR; INTRAVENOUS at 02:44

## 2019-01-01 RX ADMIN — MINERAL OIL, PETROLATUM: 425; 573 OINTMENT OPHTHALMIC at 20:18

## 2019-01-01 RX ADMIN — Medication 14 MCG/MIN: at 18:52

## 2019-01-01 RX ADMIN — PROMETHAZINE HYDROCHLORIDE 12.5 MG: 25 INJECTION INTRAMUSCULAR; INTRAVENOUS at 03:31

## 2019-01-01 RX ADMIN — HEPARIN SODIUM 5000 UNITS: 5000 INJECTION INTRAVENOUS; SUBCUTANEOUS at 13:45

## 2019-01-01 RX ADMIN — MINERAL OIL, PETROLATUM: 425; 573 OINTMENT OPHTHALMIC at 07:38

## 2019-01-01 RX ADMIN — MAGNESIUM SULFATE HEPTAHYDRATE 1 G: 1 INJECTION, SOLUTION INTRAVENOUS at 00:46

## 2019-01-01 RX ADMIN — CHLORHEXIDINE GLUCONATE 0.12% ORAL RINSE 15 ML: 1.2 LIQUID ORAL at 08:25

## 2019-01-01 RX ADMIN — ALBUTEROL SULFATE 2.5 MG: 2.5 SOLUTION RESPIRATORY (INHALATION) at 22:49

## 2019-01-01 RX ADMIN — MORPHINE SULFATE 6 MG: 4 INJECTION INTRAVENOUS at 02:44

## 2019-01-01 RX ADMIN — KETOROLAC TROMETHAMINE 30 MG: 30 INJECTION, SOLUTION INTRAMUSCULAR; INTRAVENOUS at 03:33

## 2019-01-01 RX ADMIN — MINERAL OIL, PETROLATUM: 425; 573 OINTMENT OPHTHALMIC at 04:15

## 2019-01-01 RX ADMIN — FAMOTIDINE 20 MG: 10 INJECTION, SOLUTION INTRAVENOUS at 20:01

## 2019-01-01 RX ADMIN — EPINEPHRINE 5 MCG/MIN: 1 INJECTION PARENTERAL at 05:21

## 2019-01-01 RX ADMIN — CALCIUM GLUCONATE 2 G: 98 INJECTION, SOLUTION INTRAVENOUS at 08:44

## 2019-01-01 RX ADMIN — FAMOTIDINE 20 MG: 10 INJECTION, SOLUTION INTRAVENOUS at 07:35

## 2019-01-01 ASSESSMENT — ENCOUNTER SYMPTOMS
SHORTNESS OF BREATH: 0
RESPIRATORY NEGATIVE: 1
COLOR CHANGE: 0
CONSTIPATION: 0
DIARRHEA: 0
CHEST TIGHTNESS: 0
ABDOMINAL DISTENTION: 0
CONSTIPATION: 0
COLOR CHANGE: 0
COLOR CHANGE: 0
VOMITING: 1
COLOR CHANGE: 0
SHORTNESS OF BREATH: 0
CONSTIPATION: 0
SHORTNESS OF BREATH: 0
ABDOMINAL PAIN: 0
SHORTNESS OF BREATH: 0
COUGH: 0
NAUSEA: 0
COLOR CHANGE: 0
COUGH: 0
BACK PAIN: 0
NAUSEA: 0
COLOR CHANGE: 0
ABDOMINAL PAIN: 0
SHORTNESS OF BREATH: 0
DIARRHEA: 0
VOMITING: 0
NAUSEA: 0
RHINORRHEA: 0
EYE PAIN: 0
CONSTIPATION: 0
COUGH: 0
COUGH: 0
ABDOMINAL DISTENTION: 0
VOMITING: 0
CHEST TIGHTNESS: 0
DIARRHEA: 0
VOMITING: 0
BACK PAIN: 0
ABDOMINAL DISTENTION: 0
CHEST TIGHTNESS: 0
APNEA: 0
ABDOMINAL PAIN: 1
ABDOMINAL PAIN: 0
APNEA: 0
ABDOMINAL PAIN: 0
COUGH: 0
NAUSEA: 1
NAUSEA: 0
VOMITING: 0
DIARRHEA: 0
APNEA: 0
SORE THROAT: 0

## 2019-01-01 ASSESSMENT — PULMONARY FUNCTION TESTS
PIF_VALUE: 19
PIF_VALUE: 20
PIF_VALUE: 26
PIF_VALUE: 24
PIF_VALUE: 22
PIF_VALUE: 21
PIF_VALUE: 23
PIF_VALUE: 26
PIF_VALUE: 22
PIF_VALUE: 23
PIF_VALUE: 27
PIF_VALUE: 28
PIF_VALUE: 21
PIF_VALUE: 30
PIF_VALUE: 20
PIF_VALUE: 21
PIF_VALUE: 18
PIF_VALUE: 22
PIF_VALUE: 28
PIF_VALUE: 20
PIF_VALUE: 22
PIF_VALUE: 27
PIF_VALUE: 22
PIF_VALUE: 20
PIF_VALUE: 21
PIF_VALUE: 21
PIF_VALUE: 22
PIF_VALUE: 28
PIF_VALUE: 23
PIF_VALUE: 26
PIF_VALUE: 20
PIF_VALUE: 22
PIF_VALUE: 22
PIF_VALUE: 27
PIF_VALUE: 27
PIF_VALUE: 21
PIF_VALUE: 22
PIF_VALUE: 22
PIF_VALUE: 21
PIF_VALUE: 21
PIF_VALUE: 18
PIF_VALUE: 19
PIF_VALUE: 22
PIF_VALUE: 20
PIF_VALUE: 22
PIF_VALUE: 21

## 2019-01-01 ASSESSMENT — PAIN SCALES - GENERAL
PAINLEVEL_OUTOF10: 0
PAINLEVEL_OUTOF10: 10
PAINLEVEL_OUTOF10: 10
PAINLEVEL_OUTOF10: 8
PAINLEVEL_OUTOF10: 8
PAINLEVEL_OUTOF10: 10
PAINLEVEL_OUTOF10: 0
PAINLEVEL_OUTOF10: 8
PAINLEVEL_OUTOF10: 2

## 2019-01-01 ASSESSMENT — PAIN DESCRIPTION - PAIN TYPE
TYPE: ACUTE PAIN
TYPE: ACUTE PAIN

## 2019-01-01 ASSESSMENT — PAIN DESCRIPTION - ORIENTATION
ORIENTATION: LEFT
ORIENTATION: LEFT
ORIENTATION: MID

## 2019-01-01 ASSESSMENT — PAIN DESCRIPTION - FREQUENCY
FREQUENCY: CONTINUOUS

## 2019-01-01 ASSESSMENT — PAIN DESCRIPTION - DESCRIPTORS
DESCRIPTORS: SHARP
DESCRIPTORS: CONSTANT;SHARP
DESCRIPTORS: THROBBING;SHARP;CONSTANT

## 2019-01-01 ASSESSMENT — PAIN DESCRIPTION - LOCATION
LOCATION: KNEE
LOCATION: KNEE
LOCATION: ABDOMEN

## 2019-01-01 ASSESSMENT — PAIN SCALES - WONG BAKER
WONGBAKER_NUMERICALRESPONSE: 8
WONGBAKER_NUMERICALRESPONSE: 8

## 2019-01-01 ASSESSMENT — PAIN DESCRIPTION - ONSET: ONSET: SUDDEN

## 2019-04-09 NOTE — PROGRESS NOTES
every 4 hours as needed for Wheezing or Shortness of Breath (Space out to every 6 hours as symptoms improve) Space out to every 6 hours as symptoms improve. 1 Inhaler 0    Pediatric Multivitamins-Iron (MULTIVITAMINS PLUS IRON CHILD) 18 MG CHEW Take 1 tablet by mouth 2 times daily 180 tablet 3    cyanocobalamin 1000 MCG/ML injection INJECT 1 ML INTO THE APPROPRIATE MUSCLE Q 14 DAYS  11    Cholecalciferol (VITAMIN D3) 27070 units CAPS Take 50,000 Units by mouth      BIOTIN PO Take 10,000 mcg by mouth daily       No current facility-administered medications for this visit. Allergies:    Keflex [cephalexin]; Nsaids;  Other; and Pcn [penicillins]    Social History:   Social History     Socioeconomic History    Marital status:      Spouse name: None    Number of children: None    Years of education: None    Highest education level: None   Occupational History    None   Social Needs    Financial resource strain: None    Food insecurity:     Worry: None     Inability: None    Transportation needs:     Medical: None     Non-medical: None   Tobacco Use    Smoking status: Current Every Day Smoker     Packs/day: 1.00     Years: 15.00     Pack years: 15.00     Types: Cigarettes    Smokeless tobacco: Never Used    Tobacco comment: stopped 6/2016   Substance and Sexual Activity    Alcohol use: Yes     Comment: socially    Drug use: Yes     Types: Marijuana     Comment: occasional use    Sexual activity: Yes     Partners: Male     Birth control/protection: Surgical     Comment: Tubal Ligation   Lifestyle    Physical activity:     Days per week: None     Minutes per session: None    Stress: None   Relationships    Social connections:     Talks on phone: None     Gets together: None     Attends Sikhism service: None     Active member of club or organization: None     Attends meetings of clubs or organizations: None     Relationship status: None    Intimate partner violence:     Fear of current or ex partner: None     Emotionally abused: None     Physically abused: None     Forced sexual activity: None   Other Topics Concern    None   Social History Narrative    None     Family History:  Family History   Problem Relation Age of Onset    Diabetes Mother     High Blood Pressure Mother     Heart Disease Father         stents placed    High Blood Pressure Father     Hypotension Sister     Breast Cancer Maternal Grandmother     Cervical Cancer Maternal Grandmother     Other Maternal Grandfather         Drowning accident    Breast Cancer Paternal Grandmother     Heart Disease Paternal Grandfather     Cancer Neg Hx     Colon Cancer Neg Hx     Eclampsia Neg Hx     Hypertension Neg Hx     Ovarian Cancer Neg Hx      Labor Neg Hx     Spont Abortions Neg Hx     Stroke Neg Hx      I have reviewed the CC, HPI, ROS, PMH, FHX, Social History, and if not present in this note, I have reviewed in the patient's chart. I agree with the documentation provided by other staff and have reviewed their documentation prior to providing my signature indicating agreement. Vitals:   /73   Pulse 82   Resp 20   Ht 5' 10\" (1.778 m)   Wt 155 lb (70.3 kg)   BMI 22.24 kg/m²  Body mass index is 22.24 kg/m². Physical Examination:     Orthopedics:    GENERAL: Alert and oriented X3 in no acute distress. SKIN: Intact without lesions or ulcerations. NEURO: Musculoskeletal and axillary nerves intact to sensory and motor testing. VASC: Capillary refill is less than 3 seconds. Left Knee     GEN:  Alert and oriented X 3, in no acute distress. GAIT:  The patient's gait was observed while entering the exam room and was noted to be non antalgic. The extremity is in anatomic alignment. SKIN:  Intact without rashes, lesions, or ulcerations. No obvious deformity or swelling. NEURO:  The patient responds to light touch throughout left LE. Patellar and Achilles reflexes are 2/4.   VASC:  The left LE is encounter. No orders of the defined types were placed in this encounter. Jay Gip Day V, am scribing for and in the presence of  Jorge Lemon ATC. 4/12/2019  2:36 PM    IYaneli PA-C, ATC ,  have personally seen this patient, reviewed the chart including history, and imaging if done. I personally  performed the physical exam and obtained any needed additional history. I placed orders, performed or supervised procedures and developed the treatment plan.     Electronically signed by Jyoti Carlos PA-C, on 4/12/2019 at 2:36 PM

## 2019-04-22 NOTE — TELEPHONE ENCOUNTER
Kain Lopez from [facility] called stating vistaril is on back order is asking if they can switch it to atarax.  Please advised thank you

## 2019-04-23 NOTE — TELEPHONE ENCOUNTER
Patient called today to ask for pain medication, she stated that the NSAID and tylenol isn't helping her. I reviewed previous telephone encounters and informed her we do not prescribe pain meds pre operatively. I told her she can ask her PCP and make an appointment with them.

## 2019-06-07 NOTE — ED PROVIDER NOTES
[address]  eMERGENCY dEPARTMENT eNCOUnter      Pt Name: Valente Chowdhury  MRN: [id number]  [place] 1980  Date of evaluation: 6/7/19      CHIEF COMPLAINT       Chief Complaint   Patient presents with    Knee Pain     left         HISTORY OF PRESENT ILLNESS    Valente Chowdhury is a 41 y.o. female who presents complaining of knee pain. Patient is an active workman's comp case after injuring her left knee after dropping something on it about 4 months ago. Patient states that she is seen the orthopedic doctor and the workman's comp doctor and neither one of them were willing to give her any pain medicine unless the patient requires surgery but has been unable to get an MRI due to delays in that case. Patient states that she has intermittent swelling in the leg and severe pain today. Patient states leg is more swollen than normal.  Patient has no numbness or tingling. Patient has a difficult time walking on the leg and has difficult time bending the knee. Patient states that she does get some relief when using the knee brace but was told not to use it much because she is Nemesio losing muscle mass in that leg. Patient denies any new injuries. REVIEW OF SYSTEMS       Review of Systems   Constitutional: Negative for chills and fever. Respiratory: Negative for cough and shortness of breath. Cardiovascular: Negative for chest pain and palpitations. Gastrointestinal: Negative for abdominal pain, constipation, diarrhea, nausea and vomiting. Musculoskeletal:        Left knee pain with leg swelling   Neurological: Negative for dizziness, seizures and headaches.        PAST MEDICAL HISTORY     Past Medical History:   Diagnosis Date    Anemia     [facility] at [facility]/24/2014    Bronchitis     Compression fracture     Depression 4/8/2014    Enlarged uterus     Hypertension     Hypertension     Menorrhagia     Smoker     Substance abuse ([facility] [address] 11/15/2018    Vitamin other; and pcn [penicillins]. SOCIAL HISTORY      reports that she has been smoking cigarettes. She has a 15.00 pack-year smoking history. She has never used smokeless tobacco. She reports that she drinks alcohol. She reports that she has current or past drug history. Drug: Marijuana. PHYSICAL EXAM     INITIAL VITALS: BP (!) 158/88   Pulse 102   Temp 97.8 °F (36.6 °C) (Oral)   Resp 16   Ht 5' 10\" (1.778 m)   Wt 158 lb (71.7 kg)   LMP 04/23/2019 (Approximate)   SpO2 99%   BMI 22.67 kg/m²      Physical Exam   Constitutional: She is oriented to person, place, and time. She appears well-developed and well-nourished. No distress. HENT:   Head: Normocephalic and atraumatic. Eyes: Pupils are equal, round, and reactive to light. Conjunctivae and EOM are normal. Right eye exhibits no discharge. Left eye exhibits no discharge. No scleral icterus. Pulmonary/Chest: Effort normal. No respiratory distress. Musculoskeletal:   Examination of the left leg does not reveal any obvious swelling compared to the right with patient does have tenderness diffusely on the left leg of the thigh and of the calf. Patient's pulses are normal.  Knee does not look like there is any effusion but there is diffuse tenderness mostly in the lateral aspect. Neurological: She is alert and oriented to person, place, and time. Skin: Skin is warm and dry. No rash noted. She is not diaphoretic. No erythema. No pallor. Psychiatric: She has a normal mood and affect. Her behavior is normal. Judgment and thought content normal.   Nursing note and vitals reviewed. DIAGNOSTIC RESULTS     RADIOLOGY:All plain film, CT,MRI, and formal ultrasound images (except ED bedside ultrasound) are read by the radiologist and the interpretations are directly viewed by the emergency physician. LABS: All lab results were reviewed by myself, and all abnormals are listed below.   Labs Reviewed - No data to display      MEDICAL DECISION MAKING:     Patient is having chronic pain and swelling related to a injury 4 months ago. X-rays at that time were negative and patient is actively working with Workmen's Compensation to have her knee further evaluated. I told her we would give her something for pain here. Due to the presence of the pain in the calf and thigh the thought of possible DVT is there and she has not had an ultrasound but I cannot get that done tonight so I will send her home with an order for an outpatient exam.      EMERGENCY DEPARTMENT COURSE:   Vitals:    Vitals:    06/07/19 0237   BP: (!) 158/88   Pulse: 102   Resp: 16   Temp: 97.8 °F (36.6 °C)   TempSrc: Oral   SpO2: 99%   Weight: 158 lb (71.7 kg)   Height: 5' 10\" (1.778 m)       The patient was given the following medications while in the emergency department:  Orders Placed This Encounter   Medications    traMADol (ULTRAM) tablet 50 mg       -------------------------      CONSULTS:  None    PROCEDURES:  None    FINAL IMPRESSION      1. Chronic pain of left knee          DISPOSITION/PLAN   DISPOSITION Decision To Discharge 06/07/2019 02:51:07 AM      PATIENT REFERREDTO:  Gabrielle Rodriguez PA-C  [address] [place] [address]  [phone]    In 1 week      [facility] ED  [facility] [address] [phone]    If symptoms worsen      DISCHARGEMEDICATIONS:  Current Discharge Medication List          (Please note that portions of this note were completed with a voice recognition program.  Efforts were made to edit thedictations but occasionally words are mis-transcribed.)    Keenan Red MD  Attending Emergency Physician                        Keenan Red MD  06/07/19 6255

## 2019-12-09 PROBLEM — I46.9 CARDIAC ARREST (HCC): Status: ACTIVE | Noted: 2019-01-01

## 2019-12-09 NOTE — PLAN OF CARE
Problem: OXYGENATION/RESPIRATORY FUNCTION  Goal: Patient will maintain patent airway  12/9/2019 0534 by John Ek, RCP  Outcome: Ongoing  12/9/2019 0533 by John Ricardo RCP  Outcome: Ongoing  Goal: Patient will achieve/maintain normal respiratory rate/effort  Description  Respiratory rate and effort will be within normal limits for the patient  12/9/2019 0534 by Jhon Ek, RCP  Outcome: Ongoing  12/9/2019 0533 by John Ek RCP  Outcome: Ongoing     Problem: MECHANICAL VENTILATION  Goal: Patient will maintain patent airway  12/9/2019 0534 by John Ek, RCP  Outcome: Ongoing  12/9/2019 0533 by John Ek, RCP  Outcome: Ongoing  Goal: Oral health is maintained or improved  12/9/2019 0534 by John Ricardo RCP  Outcome: Ongoing  12/9/2019 0533 by John Ricardo RCP  Outcome: Ongoing  Goal: ET tube will be managed safely  12/9/2019 0534 by John Ricardo, RCP  Outcome: Ongoing  12/9/2019 0533 by John Ricardo RCP  Outcome: Ongoing  Goal: Ability to express needs and understand communication  12/9/2019 0534 by John Ricardo RCP  Outcome: Ongoing  12/9/2019 0533 by John Ricardo RCP  Outcome: Ongoing  Goal: Mobility/activity is maintained at optimum level for patient  12/9/2019 0534 by John Ricardo RCP  Outcome: Ongoing  12/9/2019 0533 by John Ricardo RCP  Outcome: Ongoing     Problem: SKIN INTEGRITY  Goal: Skin integrity is maintained or improved  12/9/2019 0534 by John Ricardo RCP  Outcome: Ongoing  12/9/2019 0533 by John Ricardo RCP  Outcome: Ongoing

## 2019-12-09 NOTE — ED NOTES
Bed: 13  Expected date:   Expected time:   Means of arrival:   Comments:     Roena Runner, RN  12/09/19 1969

## 2019-12-09 NOTE — ED PROVIDER NOTES
[address]  Emergency Department Encounter  Emergency Medicine Resident     Pt Name: Oliver Abad  MRN: [id number]  [place] 1980  Date of evaluation: 12/9/19  PCP:  Gerardo Zhu PA-C    CHIEF COMPLAINT     No chief complaint on file. HISTORY OF PRESENT ILLNESS  (Location/Symptom, Timing/Onset, Context/Setting, Quality, Duration, Modifying Factors, Severity.)    Rosa Elena Fernandez is a 46 y.o. adult who presents with drug overdose suspected. Per EMS report: Boyfriend stated that patient was taking significant amount of cocaine and then she became unresponsive when EMS was on scene she was in PEA arrest required intubation and a round of epinephrine as well as CPR. In route she arrested again and had V. fib requiring 200 J defibrillation. BG in the 100s.            PAST MEDICAL / SURGICAL / SOCIAL / FAMILY HISTORY   Unable to obtain PMH and PSH    Social History     Socioeconomic History    Marital status:      Spouse name: Not on file    Number of children: Not on file    Years of education: Not on file    Highest education level: Not on file   Occupational History    Not on file   Social Needs    Financial resource strain: Not on file    Food insecurity:     Worry: Not on file     Inability: Not on file    Transportation needs:     Medical: Not on file     Non-medical: Not on file   Tobacco Use    Smoking status: Not on file   Substance and Sexual Activity    Alcohol use: Not on file    Drug use: Not on file    Sexual activity: Not on file   Lifestyle    Physical activity:     Days per week: Not on file     Minutes per session: Not on file    Stress: Not on file   Relationships    Social connections:     Talks on phone: Not on file     Gets together: Not on file     Attends Holiness service: Not on file     Active member of club or organization: Not on file     Attends meetings of clubs or organizations: Not on file     Relationship status: Not on file Reflex to MG    Pregnancy, Urine    T4    CK    Myoglobin, Serum    CK    DRUG SCREEN MULTI URINE    Basic Metabolic Panel    Magnesium    Phosphorus    Calcium, Ionized    Potassium    Blood Gas, Arterial    Lactic Acid, Plasma    APTT    Protime-INR    Blood gas, arterial    Diet NPO Effective Now    Vital signs: Therapeutic Hypothermia    Notify Physician    Bedrest    Continuous temperature probe    Insert indwelling urinary catheter    Discontinue indwelling urinary catheter when documented indications no longer apply per hospital policy    If no central line inserted contact ordering provider    Intake and output    Neuro checks    RASS assessment cooling, maintenance and re-warming phases.     Target Cooling Temperature    Cooling device    Shivering Instructions    During rewarming phase: Discontinue all potassium containing drugs and fluids 8 hours prior to rewarming    Nursing Communication Potassium - Rewarming    Rewarming instructions    Rewarming phase instruction    During rewarming phase: Do not lighten sedation from RASS-4 until TOF is 4/4    During rewarming phase: After TOF 4/4 goal is RASS-1    Elevate Head of Bed    Place intermittent pneumatic compression device    Full Code    Initiate Oxygen Therapy Protocol    Mechanical ventilation    Pulse oximetry, continuous    Capnography    Oxygen Therapy    Post Extubation Oxygen Therapy    Mechanical Ventilation with default initial settings    Initiate RT Adult Mechanical Ventilation Protocol    Mechanical Ventilation with default initial settings    Arterial Blood Gas, POC    Creatinine W/GFR Point of Care    Lactic Acid, POC    POCT Glucose    Anion Gap (Calc) POC    Insert arterial line    PATIENT STATUS (FROM ED OR OR/PROCEDURAL) Inpatient       MEDICATIONS ORDERED:  Orders Placed This Encounter   Medications    EPINEPHrine PF 1 MG/10ML injection    DISCONTD: EPINEPHrine (EPINEPHrine HCL) 1 mg in dextrose 5 % 50 mL syringe    EPINEPHrine 1 MG/ML injection     Dorothy Paganu: cabinet override    sodium bicarbonate 150 mEq in dextrose 5 % 1,000 mL infusion    EPINEPHrine (EPINEPHrine HCL) 5 mg in dextrose 5 % 250 mL infusion    sodium bicarbonate 8.4 % injection    ondansetron (ZOFRAN) injection 4 mg    chlorhexidine (PERIDEX) 0.12 % solution 15 mL           MDM:    Rosa Elena Hull is a 46 y.o. adult who presents with cardiac arrest.  EKG was obtained as ACLS process continued and [place] was achieved. EKG demonstrated STEMI with associated reciprocal changes diffusely. Interventional cardiology was consulted and Cath Lab was activated. Patient had an arterial line placed and was hypotensive so epi drip was started. She did require push dose epinephrine. Please see nursing notes for code documentation. Patient's pH was 6.9, 3 A of push dose of bicarb was given. Bicarb drip was ordered. PROCEDURE NOTE - EMERGENCY INTUBATION    PATIENT NAME: Sean Crouch  MEDICAL RECORD NO. [id number]  DATE: 12/9/2019  ATTENDING PHYSICIAN: Aubrey Gudino    PREOPERATIVE DIAGNOSIS:  Acute Respiratory Failure  POSTOPERATIVE DIAGNOSIS:  Same  PROCEDURE PERFORMED:  Emergency endotracheal intubation  PERFORMING PHYSICIAN: Naz Ansari DO    MEDICATIONS: None    DISCUSSION:  Rosa Elena Chaudhari is a 46y.o.-year-old adult who requires intubation and ventilatory support due to Respiratory failure. The history and physical examination were reviewed and confirmed. CONSENT: Unable to be obtained due to the emergent nature of this procedure. PROCEDURE:  A timeout was initiated by the bedside nurse and was confirmed by those present. The patient was placed in the appropriate position. Cricoid pressure was not required. Intubation was performed by direct laryngoscopy using a laryngoscope and a 7.5 cuffed endotracheal tube.   The cuff was then inflated and the tube was secured appropriately at a mA/kV was utilized to reduce the radiation dose to as low as reasonably achievable. COMPARISON: None. HISTORY: ORDERING SYSTEM PROVIDED HISTORY: AMS, cardiac arrest TECHNOLOGIST PROVIDED HISTORY: AMS, cardiac arrest Reason for Exam: cardac arrest Acuity: Unknown Type of Exam: Unknown FINDINGS: BRAIN/VENTRICLES: There is diffuse loss of gray-white differentiation. There is effacement of the sulci and basal cisterns. The lateral ventricles are slit-like and the 3rd ventricle is not identified. The 4th ventricle is small. There is no midline shift. No acute hemorrhage is seen. ORBITS: The visualized portion of the orbits demonstrate no acute abnormality. SINUSES: The visualized paranasal sinuses and mastoid air cells demonstrate no acute abnormality. SOFT TISSUES/SKULL:  No acute abnormality of the visualized skull or soft tissues. Diffuse cerebral edema consistent with anoxic brain injury. No acute hemorrhage. Xr Chest Portable    Result Date: 12/9/2019  EXAMINATION: ONE XRAY VIEW OF THE CHEST 12/9/2019 4:16 am COMPARISON: None. HISTORY: ORDERING SYSTEM PROVIDED HISTORY: OG and ET tube placement, ROSC TECHNOLOGIST PROVIDED HISTORY: OG and ET tube placement, ROSC Reason for Exam: Stemi alert. Supine. FINDINGS: Frontal portable view of the chest.  Endotracheal tube terminates 3 cm above the eliana. Enteric tube tip overlies the left upper quadrant, likely in the proximal stomach. Low lung volume. Bilateral subsegmental atelectasis. No lobar consolidation. No pleural effusion or pneumothorax. Normal cardiomediastinal silhouette and great vessels. No acute osseous abnormality. Multiple loops of distended bowel in the upper abdomen. Left upper quadrant surgical clips. Life-support devices as above. Bilateral subsegmental atelectasis. No lobar consolidation. CONSULTS:  None    CRITICAL CARE:  Please see attending note    FINAL IMPRESSION     1.  Cardiac arrest ([facility] [address]          DISPOSITION / PLAN   DISPOSITION Admitted 12/09/2019 06:23:08 AM          PATIENT REFERRED TO:  No follow-up provider specified. DISCHARGE MEDICATIONS:  There are no discharge medications for this patient. Dr. Black Acharya.  [address] [facility]  Emergency Medicine Resident Physician, PGY-2    (Please note that portions of this note were completed with a voice recognition program.  Efforts were made to edit the dictations but occasionally words are mis-transcribed.)          Velma Black DO  Resident  12/09/19 0029

## 2019-12-09 NOTE — FLOWSHEET NOTE
[address] [facility] [address]     Emergency/Trauma Note    PATIENT NAME: Ernesto White    Shift date:12/09/2019  Shift day: Sunday   Shift # 3    Room # 13/13   Name: Ernesto White       Age: 46 y.o. Gender: Female         Religious: No Restorationism on file   Place of Congregational:     Trauma/Incident type: Stemi Alert  Admit Date & Time: 12/9/2019  4:57 AM  TRAUMA NAME: Massimo Dias        PATIENT/EVENT DESCRIPTION:  Ernesto White is a 46 y.o. Female  who arrived via [facility] from [place] [address] as a STEMI Alert. . Pt to be admitted to 13/13. Phone number used to call-in to EMS dispatch   Alexis Sandy who called in : Cathy Grant    [facility] ASSESSMENT/INTERVENTION:  (Please note all relevant care assessments and interventions and duties performed. Also include important family names, numbers, and dynamics)     PATIENT BELONGINGS:  No belongings noted    ANY BELONGINGS OF SIGNIFICANT VALUE NOTED:  none    REGISTRATION STAFF NOTIFIED?   Yes      WHAT IS YOUR SPIRITUAL CARE PLAN FOR THIS PATIENT?:   Abbi pateint and family/friends as possible     Electronically signed by Azalea Robles, on 12/9/2019 at 5:39 AM.  [facility]  [phone]

## 2019-12-09 NOTE — PROGRESS NOTES
Patient's nurse spoke with both adult daughters, and notified me that Arthur relinquished/ deferred decision making to the eldest daughter solely, Sommer Hawkins.     Sarika Smith DO  PGY 2  Resident Physician Emergency Medicine  12/09/19 4:23 PM

## 2019-12-09 NOTE — OP NOTE
key elements of the encounter have been performed by me. I agree with the assessment, plan and orders as documented by the resident With changes made to the note. Procedure performed by me.     Electronically signed by Shantelle Upton MD on 12/10/2019 at 2:16 PM.    [facility]      [phone]

## 2019-12-09 NOTE — ED PROVIDER NOTES
EKG did show significant ST depressions in the inferior and lateral leads with elevations noted in aVR and V1. Boyfriend did tell EMS that patient is a known drug user, and uses cocaine is 38years old. Patient unresponsive, pupils are 9 mm and unreactive bilaterally. ET tube is in place, bilateral breath sounds are present abdomen is soft non distended  Audios noted to her left proximal tibia. No signs of trauma    Patient initial labs show a PH of 6.5, and a lactic acid >20. STEMI was paged, resident spoke with dr. Alexandra Hernandez,  And cath lab team called in.    5:34 AM  Cath lab Rn at bedside to update cardiology regarding lab results.   Patient on epi drip due to hypotension, and bicarb drip due to profound acidosis      Leon Kapoor D.O, M.P.H  Attending Emergency Medicine Physician         Leon Kapoor,   12/09/19 3923

## 2019-12-09 NOTE — H&P
medications    Not on File       SOCIAL HISTORY:       TOBACCO:   has no history on file for tobacco.  ETOH:   has no history on file for alcohol. DRUGS:  has no history on file for drug. FAMILY HISTORY:      No family history on file. REVIEW OF SYSTEMS (ROS):     Review of Systems -   Intubated and sedated    OBJECTIVE:     VITAL SIGNS:  BP (!) 77/42   Pulse 91   Temp (!) 86.5 °F (30.3 °C) (Core)   Resp 14   SpO2 95%   Tmax over 24 hours:  Temp (24hrs), Av.5 °F (30.3 °C), Min:86.5 °F (30.3 °C), Max:86.5 °F (30.3 °C)      Patient Vitals for the past 8 hrs:   BP Temp Temp src Pulse Resp SpO2   19 0750 -- -- -- 91 14 95 %   19 0659 -- -- -- -- 16 --   19 0537 (!) 77/42 -- -- 82 17 100 %   19 0523 -- (!) 86.5 °F (30.3 °C) CORE -- -- --   19 0522 (!) 58/36 -- -- 80 16 100 %   19 0519 -- -- -- 91 16 99 %   19 0517 (!) 68/34 -- -- 84 16 99 %   19 0514 (!) 66/44 -- -- 82 19 99 %   19 0512 (!) 71/43 -- -- 81 17 99 %   19 0511 (!) 71/43 -- -- 81 15 99 %   19 0509 (!) 66/49 -- -- 79 19 100 %   19 0509 (!) 66/49 -- -- 87 16 99 %   19 0506 (!) 128/110 -- -- 80 20 93 %   19 0502 (!) 113/56 -- -- 84 25 99 %   19 0457 (!) 152/87 -- -- 82 21 100 %       No intake or output data in the 24 hours ending 19 0832    Wt Readings from Last 3 Encounters:   No data found for Wt     There is no height or weight on file to calculate BMI. PHYSICAL EXAM:  Constitutional: Intubated and sedated  EENT: neck supple with midline trachea. Neck: Supple, symmetrical, trachea midline, no adenopathy,  no jvd, skin normal  Respiratory: clear to auscultation, no wheezes or rales and unlabored breathing.  No intercostal tenderness  Cardiovascular: irregular rate and rhythm, normal S1, S2, no murmur noted  Abdomen: soft, nontender, nondistended, no masses or organomegaly  Extremities:   no pedal edema, no clubbing or cyanosis    MEDICATIONS:  Scheduled Meds:   EPINEPHrine        sodium chloride flush  10 mL Intravenous 2 times per day    chlorhexidine  15 mL Mouth/Throat BID     Continuous Infusions:   sodium bicarbonate infusion 125 mL/hr at 12/09/19 0543    EPINEPHrine infusion 13 mcg/min (12/09/19 0741)     PRN Meds:   sodium chloride flush, 10 mL, PRN  acetaminophen, 650 mg, Q4H PRN  magnesium hydroxide, 30 mL, Daily PRN  ondansetron, 4 mg, Q6H PRN          ABGs:   Lab Results   Component Value Date    MFV1HCY 16 12/09/2019    FIO2 70.0 12/09/2019       DATA:  Complete Blood Count:   Recent Labs     12/09/19 0526   WBC 22.9   RBC 3.91   HGB 12.4   HCT 43.0   .0   MCH 31.7   MCHC 28.8   RDW 13.2      MPV 11.1        Last 3 Blood Glucose:   Recent Labs     12/09/19 0526 12/09/19 0756   GLUCOSE 125 166        PT/INR:  No results found for: PROTIME, INR  PTT:  No results found for: APTT, PTT    Comprehensive Metabolic Profile:   Recent Labs     12/09/19 0526 12/09/19 0614 12/09/19 0756     --  147   K 5.0  --  3.3   CL 99  --  100   CO2 7  --  12   BUN 14  --  16   CREATININE 1.53 Result not available.  1.51   GLUCOSE 125  --  166   CALCIUM 9.0  --  7.4   PROT 6.2  --   --    LABALBU 3.6  --   --    BILITOT 0.21  --   --    ALKPHOS 77  --   --    AST 1,115  --   --    ALT 1,107  --   --       Magnesium:   Lab Results   Component Value Date    MG 2.6 12/09/2019     Phosphorus:   Lab Results   Component Value Date    PHOS 12.2 12/09/2019     Ionized Calcium: No results found for: CAION     Urinalysis: No results found for: Sam Schools, PHUR, LABCAST, WBCUA, RBCUA, MUCUS, TRICHOMONAS, YEAST, BACTERIA, CLARITYU, SPECGRAV, LEUKOCYTESUR, UROBILINOGEN, BILIRUBINUR, BLOODU, GLUCOSEU, KETUA, AMORPHOUS    HgBA1c:  No results found for: LABA1C  TSH:    Lab Results   Component Value Date    TSH 13.52 12/09/2019       Lactic Acid:   Lab Results   Component Value Date    LACTA NOT REPORTED 12/09/2019 Troponin: No results for input(s): TROPONINI in the last 72 hours. Radiological imaging  Ct Head Wo Contrast    Result Date: 12/9/2019  EXAMINATION: CT OF THE HEAD WITHOUT CONTRAST  12/9/2019 5:31 am TECHNIQUE: CT of the head was performed without the administration of intravenous contrast. Dose modulation, iterative reconstruction, and/or weight based adjustment of the mA/kV was utilized to reduce the radiation dose to as low as reasonably achievable. COMPARISON: None. HISTORY: ORDERING SYSTEM PROVIDED HISTORY: AMS, cardiac arrest TECHNOLOGIST PROVIDED HISTORY: AMS, cardiac arrest Reason for Exam: cardac arrest Acuity: Unknown Type of Exam: Unknown FINDINGS: BRAIN/VENTRICLES: There is diffuse loss of gray-white differentiation. There is effacement of the sulci and basal cisterns. The lateral ventricles are slit-like and the 3rd ventricle is not identified. The 4th ventricle is small. There is no midline shift. No acute hemorrhage is seen. ORBITS: The visualized portion of the orbits demonstrate no acute abnormality. SINUSES: The visualized paranasal sinuses and mastoid air cells demonstrate no acute abnormality. SOFT TISSUES/SKULL:  No acute abnormality of the visualized skull or soft tissues. Diffuse cerebral edema consistent with anoxic brain injury. No acute hemorrhage. Xr Chest Portable    Result Date: 12/9/2019  EXAMINATION: ONE XRAY VIEW OF THE CHEST 12/9/2019 4:16 am COMPARISON: None. HISTORY: ORDERING SYSTEM PROVIDED HISTORY: OG and ET tube placement, ROSC TECHNOLOGIST PROVIDED HISTORY: OG and ET tube placement, ROSC Reason for Exam: Stemi alert. Supine. FINDINGS: Frontal portable view of the chest.  Endotracheal tube terminates 3 cm above the eliana. Enteric tube tip overlies the left upper quadrant, likely in the proximal stomach. Low lung volume. Bilateral subsegmental atelectasis. No lobar consolidation. No pleural effusion or pneumothorax.   Normal cardiomediastinal silhouette current plan with the critical care attending. The above mentioned assessment and plan will be reviewed again in detail by the critical care attending at bedside, and can be further changed or modified accordingly by the attending physician.

## 2019-12-09 NOTE — ED NOTES
Pt to ED via [facility] for cardiac arrest, pt currently arresting in vfib on arrival on the [place]. Pt was found in bed by boyfriend with no pulses, when EMS got to her, ACLS started and there was ROSC. Pt was given two rounds of epi PTA. Pt has a hx of cocaine use, unsure if use was this am.   7.0 ET tube and left IO tib/fib in place. Pt on full cardiac monitor and [place].      Brett Perkins RN  12/09/19 7630

## 2019-12-09 NOTE — PROGRESS NOTES
[facility]   Progress Note                 Patient's name:  Terrial Cranker  Medical Record Number: [id number]  Patient's account/billing number: [de-identified]  Patient's YOB: 1980  Age: 46 y.o. Date of Admission: 2019  4:57 AM  Date of History and Physical Examination: 2019  Primary Care Physician: Vinicio Motley PA-C    Code Status: Full Code    CHIEF COMPLAINT:    No chief complaint on file. ADMISSION DIAGNOSIS:   Cardiac arrest    REASON FOR CONSULT :   Cardiac arrest    SUBJECTIVE:      Pt seen and Chart reviewed. She underwent urgent cardiac cath yesterday that revealed normal coronaries with overall preserved LV function. She continues to be on the vent, MAP 53 on levophed, not sedated, /min, rhythm atrial fibrillation. She is hypothermic, not maintaining ideal body temperature. Muscle twitching and fasciculations were noted in hand and neck muscles. Temp (24hrs), Av.5 °F (30.3 °C), Min:86.5 °F (30.3 °C), Max:86.5 °F (51.9 °C)    Systolic (33SHH), ZPM:27 , Min:58 , CMY:377     Diastolic (46RZS), YOI:27, Min:34, Max:110    Review of Systems -  Not performed due to patient's mentation.        MIDICATION:    Scheduled Meds:   EPINEPHrine        sodium chloride flush  10 mL Intravenous 2 times per day    chlorhexidine  15 mL Mouth/Throat BID    famotidine (PEPCID) injection  20 mg Intravenous BID    heparin (porcine)  5,000 Units Subcutaneous 3 times per day     Continuous Infusions:   sodium bicarbonate infusion 50 mL/hr at 19 0852    sodium chloride 75 mL/hr at 19 0934    norepinephrine 6 mcg/min (19 1151)    amiodarone 1 mg/min (19 1150)    Followed by   Jiang Plunk amiodarone         PHYSICAL EXAM:    Physical Examination:    Temperature:  Temp: (!) 86.5 °F (30.3 °C)  Respirations:  Resp: 24  Pulse:   Pulse: 96  BP:    BP: (!) 77/42    Constitutional and General Appearance: intubated, on levophed, hypothermic, COMPARISON: None. HISTORY: ORDERING SYSTEM PROVIDED HISTORY: AMS, cardiac arrest TECHNOLOGIST PROVIDED HISTORY: AMS, cardiac arrest Reason for Exam: cardac arrest Acuity: Unknown Type of Exam: Unknown FINDINGS: BRAIN/VENTRICLES: There is diffuse loss of gray-white differentiation. There is effacement of the sulci and basal cisterns. The lateral ventricles are slit-like and the 3rd ventricle is not identified. The 4th ventricle is small. There is no midline shift. No acute hemorrhage is seen. ORBITS: The visualized portion of the orbits demonstrate no acute abnormality. SINUSES: The visualized paranasal sinuses and mastoid air cells demonstrate no acute abnormality. SOFT TISSUES/SKULL:  No acute abnormality of the visualized skull or soft tissues. Diffuse cerebral edema consistent with anoxic brain injury. No acute hemorrhage. Xr Chest Portable    Result Date: 12/9/2019  EXAMINATION: ONE XRAY VIEW OF THE CHEST 12/9/2019 4:16 am COMPARISON: None. HISTORY: ORDERING SYSTEM PROVIDED HISTORY: OG and ET tube placement, ROSC TECHNOLOGIST PROVIDED HISTORY: OG and ET tube placement, ROSC Reason for Exam: Stemi alert. Supine. FINDINGS: Frontal portable view of the chest.  Endotracheal tube terminates 3 cm above the eliana. Enteric tube tip overlies the left upper quadrant, likely in the proximal stomach. Low lung volume. Bilateral subsegmental atelectasis. No lobar consolidation. No pleural effusion or pneumothorax. Normal cardiomediastinal silhouette and great vessels. No acute osseous abnormality. Multiple loops of distended bowel in the upper abdomen. Left upper quadrant surgical clips. Life-support devices as above. Bilateral subsegmental atelectasis. No lobar consolidation. IMPRESSION:    Active Problems:    Cardiac arrest [facility])  Resolved Problems:    * No resolved hospital problems. *      RECOMMENDATIONS:    1. V.  Fib arrest with ST elevations in V1 and V2 with drug overdose  - s/p cardiac cath 12/08/2019 - negative for occlusion  2. Rapid ventricular response with HR 110s - start amiodarone, dose according to order placed. 3. Patient has an overall poor prognosis in view of clinically evident neurological injury, recommend Neurology consult to evaluate for anoxic brain injury and discussion regarding code status per primary. Discussed with family and Nurse. Final plan and recommendation will follow discussion with attending Cardiologist Dr. Jasson Casillas. Florentino Adams MD      Department of Internal Medicine  [facility]         12/9/2019, 12:43 PM    I performed a history and physical examination of the patient and discussed management with the resident. I reviewed the residents note and agree with the documented findings and plan of care. Any areas of disagreement are noted on the chart. I was personally present for the key portions of any procedures. I have documented in the chart those procedures where I was not present during the key portions. I have personally evaluated this patient and have completed at least one if not all key elements of the E/M (history, physical exam, and MDM). Additional findings are as noted.     Jasson Casillas MD

## 2019-12-09 NOTE — ED NOTES
Pt returned from CT.  Reintubating due to issues with current tube placed by EMS     Nicolás López RN  12/09/19 5756

## 2019-12-09 NOTE — CONSULTS
Neurology Consult Note      Reason for Consult:  Anoxic brain injury  Requesting Physician:  Jade Bolivar COMPLAINT:  Cardiac Arrest    History Obtained From:  patient, electronic medical record       HISTORY OF PRESENT ILLNESS:              The patient is a 46 y.o. female with significant past medical history of  who presents with cardiac arrest found down by a significant other around 3 AM.  Last seen around 9 PM.  Patient had multiple rounds of CPR. When EMS arrived patient was in pulseless and in asystole receiving 2 rounds CPR with epinephrine and Ricco was eventually obtained. While in route to the hospital patient went to V. fib cardiac arrest rescue was achieved with single defibrillator shock 200 J. While in the emergency room patient was again found to be in pulseless V. fib with an additional 200 J shock followed by 300 mg of amiodarone to achieve ROSC. STEMI alert activated after elevations were found to V1 to V2 with ST segment depressions in the lateral and aVR leads. Patient was taken to cardiac cath emergently and found to have normal coronary arteries. Currently on levophed, tachycardic, with atrial fibrillation. Neurology consulted for seizure-like activity. Past Medical History:    No past medical history on file. Past Surgical History:    No past surgical history on file.   Current Medications:   Current Facility-Administered Medications: EPINEPHrine 1 MG/ML injection, , ,   sodium bicarbonate 150 mEq in dextrose 5 % 1,000 mL infusion, , Intravenous, Continuous  sodium chloride flush 0.9 % injection 10 mL, 10 mL, Intravenous, 2 times per day  sodium chloride flush 0.9 % injection 10 mL, 10 mL, Intravenous, PRN  acetaminophen (TYLENOL) tablet 650 mg, 650 mg, Oral, Q4H PRN  magnesium hydroxide (MILK OF MAGNESIA) 400 MG/5ML suspension 30 mL, 30 mL, Oral, Daily PRN  ondansetron (ZOFRAN) injection 4 mg, 4 mg, Intravenous, Q6H PRN  chlorhexidine (PERIDEX) 0.12 % solution 15 mL, 15 mL, Mouth/Throat, BID  0.45 % sodium chloride infusion, , Intravenous, Continuous  potassium chloride 20 mEq/50 mL IVPB (Central Line), 20 mEq, Intravenous, PRN  sodium chloride 0.45 % bolus, 1,000 mL, Intravenous, Once  norepinephrine (LEVOPHED) 16 mg in dextrose 5% 250 mL infusion, 2 mcg/min, Intravenous, Continuous  famotidine (PEPCID) injection 20 mg, 20 mg, Intravenous, BID  heparin (porcine) injection 5,000 Units, 5,000 Units, Subcutaneous, 3 times per day  amiodarone (CORDARONE) 150 mg in dextrose 5 % 100 mL bolus, 150 mg, Intravenous, Once **FOLLOWED BY** amiodarone (CORDARONE) 450 mg in dextrose 5 % 250 mL infusion, 1 mg/min, Intravenous, Continuous **FOLLOWED BY** amiodarone (CORDARONE) 450 mg in dextrose 5 % 250 mL infusion, 0.5 mg/min, Intravenous, Continuous  Allergies:  Patient has no allergy information on record. Social History:  TOBACCO:   has no history on file for tobacco.  ETOH:   has no history on file for alcohol. DRUGS:   has no history on file for drug. ACTIVITIES OF DAILY LIVING:    INSTRUMENTAL ACTIVITIES OF DAILY LIVING:  Patient is able to perform all instrumental activities of daily living. Family History:   No family history on file. REVIEW OF SYSTEMS:  Review of Systems   Unable to perform ROS: Intubated       PHYSICAL EXAM:    Vitals:  BP (!) 77/42   Pulse 91   Temp (!) 86.5 °F (30.3 °C) (Core)   Resp 14   SpO2 95%        Physical Exam  Constitutional:       Interventions: She is intubated and restrained. Cardiovascular:      Rate and Rhythm: Tachycardia present. Rhythm irregular. Pulmonary:      Effort: She is intubated. Neurological:      Mental Status: She is unresponsive. GCS: GCS eye subscore is 1. GCS verbal subscore is 1. GCS motor subscore is 1. Cranial Nerves: No facial asymmetry. Motor: Seizure activity present. Deep Tendon Reflexes:      Reflex Scores:       Tricep reflexes are 2+ on the right side and 2+ on the left side. Granulocyte 1.37 (H) 0.00 - 0.30 k/uL    Segs Absolute 13.29 (H) 1.8 - 7.7 k/uL    Absolute Lymph # 6.18 (H) 1.0 - 4.8 k/uL    Absolute Mono # 1.83 (H) 0.1 - 0.8 k/uL    Absolute Eos # 0.00 0.0 - 0.4 k/uL    Basophils Absolute 0.23 (H) 0.0 - 0.2 k/uL    Morphology Normal    Comprehensive Metabolic Panel w/ Reflex to MG    Collection Time: 12/09/19  5:26 AM   Result Value Ref Range    Glucose 125 (H) 70 - 99 mg/dL    BUN 14 6 - 20 mg/dL    CREATININE 1.53 (H) 0.50 - 0.90 mg/dL    Bun/Cre Ratio NOT REPORTED 9 - 20    Calcium 9.0 8.6 - 10.4 mg/dL    Sodium 142 135 - 144 mmol/L    Potassium 5.0 3.7 - 5.3 mmol/L    Chloride 99 98 - 107 mmol/L    CO2 7 (LL) 20 - 31 mmol/L    Anion Gap 36 (H) 9 - 17 mmol/L    Alkaline Phosphatase 77 35 - 104 U/L    ALT 1,107 (H) 5 - 33 U/L    AST 1,115 (H) <32 U/L    Total Bilirubin 0.21 (L) 0.3 - 1.2 mg/dL    Total Protein 6.2 (L) 6.4 - 8.3 g/dL    Alb 3.6 3.5 - 5.2 g/dL    Albumin/Globulin Ratio 1.4 1.0 - 2.5    GFR Non-African American  >60 mL/min     Unable to calculate due to missing demographic information. GFR   >60 mL/min     Unable to calculate due to missing demographic information.     GFR Comment      GFR Staging NOT REPORTED    T4    Collection Time: 12/09/19  5:26 AM   Result Value Ref Range    T4, Total 5.0 4.5 - 12.0 ug/dL   CK    Collection Time: 12/09/19  5:26 AM   Result Value Ref Range    Total  26 - 192 U/L   Myoglobin, Serum    Collection Time: 12/09/19  5:26 AM   Result Value Ref Range    Myoglobin 486 (H) 25 - 58 ng/mL   Pregnancy, Urine    Collection Time: 12/09/19  5:31 AM   Result Value Ref Range    HCG(Urine) Pregnancy Test NEGATIVE NEGATIVE   Arterial Blood Gas, POC    Collection Time: 12/09/19  6:14 AM   Result Value Ref Range    POC pH 6.944 (LL) 7.350 - 7.450    POC pCO2 50.3 (H) 35.0 - 48.0 mm Hg    POC PO2 375.5 (H) 83.0 - 108.0 mm Hg    POC HCO3 10.9 (L) 21.0 - 28.0 mmol/L    TCO2 (calc), Art 12 (L) 22.0 - 29.0 mmol/L    Negative (L) 22.0 - 29.0 mmol/L    Negative Base Excess, Art 14 (H) 0.0 - 2.0    Positive Base Excess, Art NOT REPORTED 0.0 - 3.0    POC O2  (H) 94.0 - 98.0 %    O2 Device/Flow/% NOT REPORTED     Erick Test NOT APPLICABLE     Sample Site Arterial Line     Mode NOT REPORTED     FIO2 70.0     Pt Temp 29.7     POC pH Temp 7.20     POC pCO2 Temp 34 mm Hg    POC pO2 Temp 352 mm Hg   CK    Collection Time: 12/09/19  7:56 AM   Result Value Ref Range    Total  (H) 26 - 192 U/L   Basic Metabolic Panel    Collection Time: 12/09/19  7:56 AM   Result Value Ref Range    Glucose 166 (H) 70 - 99 mg/dL    BUN 16 6 - 20 mg/dL    CREATININE 1.51 (H) 0.50 - 0.90 mg/dL    Bun/Cre Ratio NOT REPORTED 9 - 20    Calcium 7.4 (L) 8.6 - 10.4 mg/dL    Sodium 147 (H) 135 - 144 mmol/L    Potassium 3.3 (L) 3.7 - 5.3 mmol/L    Chloride 100 98 - 107 mmol/L    CO2 12 (L) 20 - 31 mmol/L    Anion Gap 35 (H) 9 - 17 mmol/L    GFR Non-African American  >60 mL/min     Unable to calculate due to missing demographic information. GFR   >60 mL/min     Unable to calculate due to missing demographic information.     GFR Comment          GFR Staging NOT REPORTED    Magnesium    Collection Time: 12/09/19  7:56 AM   Result Value Ref Range    Magnesium 2.6 1.6 - 2.6 mg/dL   Phosphorus    Collection Time: 12/09/19  7:56 AM   Result Value Ref Range    Phosphorus 12.2 (HH) 2.6 - 4.5 mg/dL   Calcium, Ionized    Collection Time: 12/09/19  7:56 AM   Result Value Ref Range    Calcium, Ion 1.01 (L) 1.13 - 1.33 mmol/L   APTT    Collection Time: 12/09/19  7:56 AM   Result Value Ref Range    PTT 30.8 (H) 20.5 - 30.5 sec   Protime-INR    Collection Time: 12/09/19  7:56 AM   Result Value Ref Range    Protime 14.5 (H) 9.0 - 12.0 sec    INR 1.4    VITAMIN B12 & FOLATE    Collection Time: 12/09/19  9:30 AM   Result Value Ref Range    Vitamin B-12 1329 (H) 232 - 1245 pg/mL    Folate >20.0 >4.8 ng/mL   Urinalysis Reflex to Culture    Collection Time: 12/09/19 consulted with Dr. Callie Edmonds MD, [facility]  PGY-2 Neurology  12/9/2019 at 11:22 AM

## 2019-12-09 NOTE — H&P
[facility]    Consult / H&P               Today's Date: 12/9/2019  Patient Name: Oliver Abad  Date of admission: 12/9/2019  4:57 AM  Patient's age: 81 y.o., 1/1/1879  Admission Dx: No admission diagnoses are documented for this encounter. Reason for Admission / Consult: Cardiac arrest    Requesting Physician: No admitting provider for patient encounter. CHIEF COMPLAINT: Unresponsive    History Obtained From:  patient, electronic medical record    HISTORY OF PRESENT ILLNESS:      The patient is a 81 y.o.  female who was brought to the ED after she was found unresponsive by her boyfriend. Per documentation, she was last seen around 8 - 9 PM last evening. EMS was called and she was found to be pulseless and in asystole. She received CPR with 2 rounds of epi and Rosc was obtained. En route to the hospital, she had V. fib cardiac arrest and YADIRA was achieved with a single defibrillator shock of 200 J. Upon arrival to the ED, patient was again found in pulseless V. fib and additional 200 J shock and 300 mg of amiodarone were given. ROSC was achieved, EKG showed ST elevations in V1 and V2 and ST depressions in lateral leads and aVR. STEMI alert was paged and patient was brought to the Cath Lab for emergent cardiac cath. Past Medical History:   has no past medical history on file. Past Surgical History:   has no past surgical history on file. Home Medications:    Prior to Admission medications    Not on File        Current Facility-Administered Medications: EPINEPHrine 1 MG/ML injection, , ,   sodium bicarbonate 150 mEq in dextrose 5 % 1,000 mL infusion, , Intravenous, Continuous  EPINEPHrine (EPINEPHrine HCL) 5 mg in dextrose 5 % 250 mL infusion, 1 mcg/min, Intravenous, Continuous    Allergies:  Patient has no allergy information on record. Social History:        Family History: family history is not on file. No h/o sudden cardiac death. No for premature tenderness  · Bowel sounds present  Extremities:  ·  No Cyanosis or Clubbing  ·  Lower extremity edema: No  ·  Skin: Warm and dry  Neurological:  · Alert and oriented. · Moves all extremities well  · No abnormalities of mood, affect, memory, mentation, or behavior are noted    DATA:    Diagnostics:    EKG: ST elevations in V1 and V2, ST depressions in lateral leads and aVR  Labs:     CBC:   Recent Labs     12/09/19 0526   WBC 22.9   HGB 12.4   HCT 43.0          BMP:   Recent Labs     12/09/19 0526   NA PENDING   K PENDING   CO2 PENDING   BUN PENDING   CREATININE PENDING   LABGLOM PENDING   GLUCOSE PENDING     Pro-BNP:    Recent Labs     12/09/19 0526   PROBNP 93     BNP: No results for input(s): BNP in the last 72 hours. PT/INR: No results for input(s): PROTIME, INR in the last 72 hours. APTT:No results for input(s): APTT in the last 72 hours. CARDIAC ENZYMES:No results for input(s): CKTOTAL, CKMB, CKMBINDEX, TROPONINI in the last 72 hours. Invalid input(s):  [address]  Recent Labs     12/09/19 0526   TROPONINT NOT REPORTED      Recent Labs     12/09/19  0526   TROPHS 65     FASTING LIPID PANEL:No results found for: HDL, LDLDIRECT, LDLCALC, TRIG  LIVER PROFILE:  Recent Labs     12/09/19 0526   AST PENDING   ALT PENDING   LABALBU PENDING         Patient's Active Problem List  Active Problems:    * No active hospital problems. *  Resolved Problems:    * No resolved hospital problems. *        IMPRESSION:    1. V. fib cardiac arrest with ST elevations in V1 and V2  2. Unknown downtime  3. Severe metabolic acidosis  4. Lactic acidosis  5. H/o substance abuse      RECOMMENDATIONS:  1. Proceed with cardiac cath  2. Further recommendations postprocedure            Discussed with patient and Nurse. Laci Garcia M.D.   Fellow, [address]        Attestation signed by      Attending Physician Statement:    I have discussed the care of  Rosa Elena Barlow

## 2019-12-10 NOTE — PROCEDURES
[address] [facility] 30        CONTINUOUS VIDEO EEG MONITORING       PATIENT: Natalie Lowery  MRN #: [id number]  DATE: 12/9/2019 at 3:57PM to 12/11/2019 at 1:38PM   REFERRING PHYSICIAN:  Silverio Jarrell MD     BRIEF HISTORY: Patient is a 46year old female was admitted after cardiac arrest. LTME was ordered to evaluate. AEDs:   Keppra 1000mg BID    EEG DESCRIPTION: 21 EEG electrodes were placed according to International 10/20 System. Single EKG electrode was also placed. Video recording was time-locked with EEG recording. BASELINE: Normal baseline was not obtainable     Sensitivity at [facility], there was no clear cerebral activity. No posterior dominant rhythm, no eye opening/closing, no changes with stimulation (pain stimulation, audio stimulation, photic stimulation). Photic stimulation did not trigger any activities. Hyperventilation was not testable. Single lead EKG showed regular, heart rate at 70s per minute. Day 1 - 12/9/2019 at 3:57PM to 12/10/2019 at 7:30AM-> 8:33AM, from 2PM to 5:27PM  AEDs: Keppra 1000mg BID-> 500mg BID   Interictal: There was no definite cerebral activity at sensitivity of 2uV in majority of the recording time. Around midnight and 4AM, there were suspected burst suppression pattern, burst in mainly in bifrontal, less than 0.5 second theta, suppression for 2-3 seconds. Rest of the recording did not show identifiable cerebral activity at sensitivity of 2uV. Ictal: None    Summary: During above recoding period, majority time, there was no definite cerebral activity, Around midnight and 4AM, there were about 1 hour suspected burst suppression pattern, the finding suggest severe diffuse encephalopathy, if no sedative, metabolic or hypothermia effects, indicates very poor prognosis. No EEG or clinical seizures were noted.  Majority of the recording showed no identifiable cerebral activity, which pointed to electrocerebral inactivity,  correlate with neurological exam is recommended. Day 2 - 12/10/2019 at 11:29 PM to 12/11/2019 at 12:20AM, from 9:23AM to 1:38PM  AEDs: Keppra 500mg BID-> off  Interictal: There was no definite cerebral activity at sensitivity of 2uV    Ictal: None    Summary: During above recoding period, there was no definite cerebral activity, which support the diagnosis of electrocerebral inactivity, correlate with neurological exam is recommended. IMPRESSION:  The patient underwent continuous video EEG monitoring from 12/9/2019 at 3:57PM to 12/11/2019 1:38PM, findings support the diagnosis of electrocerebral inactivity if no sedative, metabolic disorders or hypothermia, correlation with clinical neurological exam is recommended.      Skye Robles MD, [address] [place] [address], Neurology  Board Certified Epileptologist

## 2019-12-10 NOTE — CONSULTS
masses or organomegally appreciable, BS sluggish. :   No suprapubic or flank tenderness. Neuro:  Sedated on ventilator. Unresponsive pupils fixed and dilated no corneals  Skin:   No rashes, good skin turgor. Extremities:  No edema, palpable peripheral pulses, no cyanosis, no calf tenderness.       Labs:       Recent Labs     12/09/19  0526 12/10/19  0830   WBC 22.9* 5.7   RBC 3.91* 4.64   HGB 12.4 14.6   HCT 43.0 43.2   .0* 93.1   MCH 31.7 31.5   MCHC 28.8 33.8   RDW 13.2 13.5    283   MPV 11.1 10.5      BMP:   Recent Labs     12/09/19  2330 12/10/19  0032 12/10/19  0600 12/10/19  0830     --  140 136   K 3.6*  --  3.7 3.7     --  101 100   CO2 21  --  21 20   BUN 32*  --  36* 37*   CREATININE 1.95* 2.00* 2.30* 2.34*   GLUCOSE 110*  --  128* 124*   CALCIUM 8.6  --  7.6* 7.6*      Phosphorus:     Recent Labs     12/09/19  1723 12/09/19  2330 12/10/19  0600   PHOS 3.4 3.4 4.7*     Magnesium:    Recent Labs     12/09/19  2330 12/10/19  0600 12/10/19  0830   MG 1.5* 1.7 1.6     Albumin:    Recent Labs     12/09/19  0526 12/10/19  0600   LABALBU 3.6 2.9*     BNP:    No results found for: BNP  MARILYN:    No results found for: MARILYN  SPEP:  Lab Results   Component Value Date    PROT 4.9 12/10/2019     UPEP:   No results found for: LABPE  C3:   No results found for: C3  C4:   No results found for: C4  MPO ANCA:   No results found for: MPO  PR3 ANCA:   No results found for: PR3  Anti-GBM:   No results found for: GBMABIGG  Hep BsAg:       No results found for: HEPBSAG  Hep C AB:        No results found for: HEPCAB    Urinalysis/Chemistries:      Lab Results   Component Value Date    NITRU NEGATIVE 12/09/2019    COLORU YELLOW 12/09/2019    PHUR 6.0 12/09/2019    WBCUA 20 TO 50 12/09/2019    RBCUA 2 TO 5 12/09/2019    MUCUS NOT REPORTED 12/09/2019    TRICHOMONAS NOT REPORTED 12/09/2019    YEAST NOT REPORTED 12/09/2019    BACTERIA NOT REPORTED 12/09/2019    SPECGRAV 1.015 12/09/2019    LEUKOCYTESUR NEGATIVE 12/09/2019    UROBILINOGEN Normal 12/09/2019    BILIRUBINUR NEGATIVE 12/09/2019    GLUCOSEU NEGATIVE 12/09/2019    KETUA TRACE 12/09/2019    AMORPHOUS NOT REPORTED 12/09/2019     Urine Sodium:   No results found for: ALLI  Urine Potassium:  No results found for: KUR  Urine Chloride:  No results found for: CLUR  Urine Osmolarity: No results found for: OSMOU  Urine Protein:   No results found for: TPU  Urine Creatinine:   No results found for: LABCREA  Urine Eosinophils:  No components found for: UEOS    Radiology:     CXR:     Assessment:     1. Acute Kidney Injury: Secondary to ischemic and nephrotoxic ATN from a respiratory arrest, low flow and contrast exposure, baseline creatinine 0.6-2.4, evolving, oligo anuric  2. Status post a respiratory arrest likely from substance abuse. Coronaries are clean  3. Severe anoxic encephalopathy  4. Shock liver  5. Multisystem organ failure  6. Respiratory acidosis, preceded by anion gap metabolic acidosis      Plan:   1. Continue bicarb infusion from  2. Keep total fluid less than 100 ml an hour  3. Agree with pressor support  4. Will not order any extensive nephrological work-up given the severity of disease and previous history of no significant kidney disease   5. Prognosis very poor   6. Await neurology recommendations  7. Await family decision  8. Given the extent of injury and overall extremely poor prognosis patient is not a candidate for renal replacement therapy at present    Nutrition   Please ensure that patient is on a renal diet/TF. Avoid nephrotoxic drugs/contrast exposure. Thank you for the consultation. Please do not hesitate to contact us for any further questions/concerns. We will continue to follow along with you.

## 2019-12-10 NOTE — PROGRESS NOTES
Critical Care Team - Daily Progress Note      Date and time: 12/10/2019 12:03 PM  Patient's name:  Harsh Kraft Record Number: [id number]  Patient's account/billing number: [de-identified]  Patient's YOB: 1980  Age: 46 y.o. Date of Admission: 2019  4:57 AM  Length of stay during current admission: 1      Primary Care Physician: Jasmin Verde PA-C  ICU Attending Physician: Dr. Romario Blas Status: Full Code    Reason for ICU admission: Cardiac arrest      SUBJECTIVE:     OVERNIGHT EVENTS:       Patient seen and evalauted. Continues to be on Hypothermia protocol. Rewarming started at 7 am this morning. Will be completely rewarmed in 12 hours. Pupils not reacting to light. No corneal, gag, cough. EEG per Neurology. Showed burst suppression. No seizure activity. Very poor prognosis. Fever:-  Temp (24hrs), Av.7 °F (33.2 °C), Min:91.4 °F (33 °C), Max:93 °F (90.8 °C)      Systolic (91NKM), QJJ:588 , Min:102 , LLE:731     Diastolic (59PTO), DVI:62, Min:81, Max:81      Urine output in the last 24 hours:  In: 91067.9 [I.V.:04514]  Out: 139 [Urine:139]    Patient Vitals for the past 96 hrs (Last 3 readings):   Weight   12/10/19 0500 194 lb 10.7 oz (88.3 kg)   19 1000 183 lb 3.2 oz (83.1 kg)       AWAKE & FOLLOWING COMMANDS:  [x] No   [] Yes    CURRENT VENTILATION STATUS:     [x] Ventilator  [] BIPAP  [] Nasal Cannula [] Room Air        SECRETIONS Amount:  [] Small [] Moderate  [] Large  [] None  Color:     [] White [] Colored  [] Bloody    SEDATION:  RAAS Score:  [] Propofol gtt  [] Versed gtt  [] Ativan gtt   [x] No Sedation    PARALYZED:  [x] No    [] Yes    DIARRHEA:                [x] No                [] Yes  (C. Difficile status: [] positive                                                                                                                       [] negative [] pending)    VASOPRESSORS:  [] No    [x] Yes    If yes -   [x] Levophed       [] Dopamine     [] Vasopressin       [] Dobutamine  [] Phenylephrine         [] Epinephrine    CENTRAL LINES:     [] No   [x] Yes   (Date of Insertion:   )           If yes -     [] Right IJ     [] Left IJ [] Right Femoral [] Left Femoral                   [] Right Subclavian [] Left Subclavian       YODER'S CATHETER:   [] No   [x] Yes  (Date of Insertion:   )     URINE OUTPUT:            [] Good   [x] Low              [] Anuric      OBJECTIVE:     VITAL SIGNS:  /81   Pulse 76   Temp 93 °F (33.9 °C) (Esophageal)   Resp 28   Wt 194 lb 10.7 oz (88.3 kg)   SpO2 98%   Tmax over 24 hours:  Temp (24hrs), Av.7 °F (33.2 °C), Min:91.4 °F (33 °C), Max:93 °F (33.9 °C)      Patient Vitals for the past 6 hrs:   BP Temp Temp src Pulse Resp SpO2   12/10/19 1130 -- -- -- 76 28 98 %   12/10/19 1115 -- -- -- 76 28 98 %   12/10/19 1100 -- 93 °F (33.9 °C) Esophageal 76 28 98 %   12/10/19 1050 -- -- -- 76 26 98 %   12/10/19 1045 -- -- -- 76 24 --   12/10/19 1030 -- -- -- 75 24 --   12/10/19 1015 -- -- -- 74 24 --   12/10/19 1000 -- 92.7 °F (33.7 °C) Esophageal 74 24 --   12/10/19 0945 -- -- -- -- 24 --   12/10/19 0930 -- -- -- 74 24 --   12/10/19 0915 -- -- -- 75 24 --   12/10/19 0900 -- 92.1 °F (33.4 °C) Esophageal 75 24 --   12/10/19 0845 -- -- -- 75 24 98 %   12/10/19 0830 -- -- -- 76 24 98 %   12/10/19 0815 -- -- -- 75 24 98 %   12/10/19 08 102/81 (!) 91.8 °F (33.2 °C) Esophageal 76 19 97 %   12/10/19 0745 -- -- -- 77 20 --   12/10/19 0715 -- -- -- 76 (!) 3 96 %   12/10/19 0713 -- -- -- 75 10 96 %   12/10/19 0700 -- (!) 91.4 °F (33 °C) Esophageal 75 10 96 %   12/10/19 0645 -- -- -- 75 14 97 %   12/10/19 0630 -- -- -- 75 9 97 %   12/10/19 0615 -- -- -- 74 (!) 0 96 %         Intake/Output Summary (Last 24 hours) at 12/10/2019 1203  Last data filed at 12/10/2019 1000  Gross per 24 hour   Intake 64606.89 ml   Output 219 ml   Net 99068.89 ml

## 2019-12-10 NOTE — PLAN OF CARE
Problem: OXYGENATION/RESPIRATORY FUNCTION  Goal: Patient will maintain patent airway  12/10/2019 0857 by Marnie Parekh RN  Outcome: Ongoing  12/10/2019 0757 by Graciela Herrera RCP  Outcome: Ongoing  12/10/2019 0624 by Javier Brewer RN  Outcome: Ongoing  Goal: Patient will achieve/maintain normal respiratory rate/effort  Description  Respiratory rate and effort will be within normal limits for the patient  12/10/2019 0857 by Marnie Parekh RN  Outcome: Ongoing  12/10/2019 0757 by Graciela Herrera RCP  Outcome: Ongoing  12/10/2019 0624 by Javier Brewer RN  Outcome: Ongoing

## 2019-12-10 NOTE — PROGRESS NOTES
Neurology Daily Progress Note      Brief History              The patient is a 46 y.o. female with significant past medical history of  who presents with cardiac arrest found down by a significant other around 3 AM.  Last seen around 9 PM.  Patient had multiple rounds of CPR. When EMS arrived patient was in pulseless and in asystole receiving 2 rounds CPR with epinephrine and Beadle was eventually obtained. While in route to the hospital patient went to V. fib cardiac arrest rescue was achieved with single defibrillator shock 200 J. While in the emergency room patient was again found to be in pulseless V. fib with an additional 200 J shock followed by 300 mg of amiodarone to achieve ROSC. STEMI alert activated after elevations were found to V1 to V2 with ST segment depressions in the lateral and aVR leads. Patient was taken to cardiac cath emergently and found to have normal coronary arteries. Currently on levophed, tachycardic, with atrial fibrillation. Neurology consulted for seizure-like activity. SUBJECTIVE:  LTM indicating no activity with 1 hour of burst suppression. No seizures noted. Poor prognosis. Started on Keppra 1000 BID. No change in clinical exam from prior day. Not initiating breaths, cannot breath spontaneously over the ventilator. No brainstem reflexes. No change in neuro exam. Worsening kidney function. LTME indicates no activity with 1 hour period of burst suppression. Past Medical History:    No past medical history on file. Past Surgical History:    No past surgical history on file.   Current Medications:   Current Facility-Administered Medications: vasopressin 20 Units in dextrose 5 % 100 mL infusion, 0.04 Units/min, Intravenous, Continuous  levetiracetam (KEPPRA) 500 mg/100 mL IVPB, 500 mg, Intravenous, Q12H  0.9 % sodium chloride infusion, , Intravenous, Continuous  lubrifresh P.M. (artificial tears) ophthalmic ointment, , Both Eyes, Q4H  sodium bicarbonate 150 mEq in dextrose kg)   SpO2 98%        Physical Exam  Constitutional:       Interventions: She is intubated and restrained. HENT:      Head: Atraumatic. Cardiovascular:      Rate and Rhythm: Tachycardia present. Rhythm irregular. Pulmonary:      Effort: She is intubated. Comments: Intubated  Neurological:      Mental Status: She is unresponsive. GCS: GCS eye subscore is 1. GCS verbal subscore is 1. GCS motor subscore is 1. Cranial Nerves: No facial asymmetry. Motor: Seizure activity present. Deep Tendon Reflexes:      Reflex Scores:       Tricep reflexes are 2+ on the right side and 2+ on the left side. Bicep reflexes are 2+ on the right side and 2+ on the left side. Brachioradialis reflexes are 2+ on the right side and 2+ on the left side. Patellar reflexes are 1+ on the right side and 1+ on the left side.      Comments: Oculocephalic reflex not intact  Corneal reflex not intact  Pupils dilated bilaterally non-reactive  No gag or cough reflex  No withdrawal to pain in all 4 extremities  Not breathing over the ventilator                   DATA  Recent Results (from the past 24 hour(s))   LACTIC ACID, WHOLE BLOOD    Collection Time: 12/09/19 12:46 PM   Result Value Ref Range    Lactic Acid, Whole Blood 12.6 (H) 0.7 - 2.1 mmol/L   Basic Metabolic Panel    Collection Time: 12/09/19 12:46 PM   Result Value Ref Range    Glucose 232 (H) 70 - 99 mg/dL    BUN 20 6 - 20 mg/dL    CREATININE 1.66 (H) 0.50 - 0.90 mg/dL    Bun/Cre Ratio NOT REPORTED 9 - 20    Calcium 7.9 (L) 8.6 - 10.4 mg/dL    Sodium 142 135 - 144 mmol/L    Potassium 4.0 3.7 - 5.3 mmol/L    Chloride 101 98 - 107 mmol/L    CO2 14 (L) 20 - 31 mmol/L    Anion Gap 27 (H) 9 - 17 mmol/L    GFR Non-African American 35 (L) >60 mL/min    GFR  42 (L) >60 mL/min    GFR Comment          GFR Staging NOT REPORTED    Magnesium    Collection Time: 12/09/19 12:46 PM   Result Value Ref Range    Magnesium 2.2 1.6 - 2.6 mg/dL Phosphorus    Collection Time: 12/09/19 12:46 PM   Result Value Ref Range    Phosphorus 7.6 (H) 2.6 - 4.5 mg/dL   Calcium, Ionized    Collection Time: 12/09/19 12:49 PM   Result Value Ref Range    Calcium, Ion 1.03 (L) 1.13 - 1.33 mmol/L   Arterial Blood Gas, POC    Collection Time: 12/09/19  2:51 PM   Result Value Ref Range    POC pH 7.417 7.350 - 7.450    POC pCO2 31.3 (L) 35.0 - 48.0 mm Hg    POC PO2 166.2 (H) 83.0 - 108.0 mm Hg    POC HCO3 20.1 (L) 21.0 - 28.0 mmol/L    TCO2 (calc), Art 21 (L) 22.0 - 29.0 mmol/L    Negative Base Excess, Art 4 (H) 0.0 - 2.0    Positive Base Excess, Art NOT REPORTED 0.0 - 3.0    POC O2  (H) 94.0 - 98.0 %    O2 Device/Flow/% NOT REPORTED     Erick Test NOT APPLICABLE     Sample Site Arterial Line     Mode NOT REPORTED     FIO2 40.0     Pt Temp 32.1     POC pH Temp 7.49     POC pCO2 Temp 25 mm Hg    POC pO2 Temp 141 mm Hg   POC Glucose Fingerstick    Collection Time: 12/09/19  4:54 PM   Result Value Ref Range    POC Glucose 179 (H) 65 - 105 mg/dL   Basic Metabolic Panel    Collection Time: 12/09/19  5:23 PM   Result Value Ref Range    Glucose 175 (H) 70 - 99 mg/dL    BUN 26 (H) 6 - 20 mg/dL    CREATININE 1.79 (H) 0.50 - 0.90 mg/dL    Bun/Cre Ratio NOT REPORTED 9 - 20    Calcium 8.1 (L) 8.6 - 10.4 mg/dL    Sodium 140 135 - 144 mmol/L    Potassium 3.7 3.7 - 5.3 mmol/L    Chloride 102 98 - 107 mmol/L    CO2 19 (L) 20 - 31 mmol/L    Anion Gap 19 (H) 9 - 17 mmol/L    GFR Non-African American 32 (L) >60 mL/min    GFR  38 (L) >60 mL/min    GFR Comment          GFR Staging NOT REPORTED    Magnesium    Collection Time: 12/09/19  5:23 PM   Result Value Ref Range    Magnesium 1.8 1.6 - 2.6 mg/dL   Phosphorus    Collection Time: 12/09/19  5:23 PM   Result Value Ref Range    Phosphorus 3.4 2.6 - 4.5 mg/dL   Calcium, Ionized    Collection Time: 12/09/19  5:23 PM   Result Value Ref Range    Calcium, Ion 1.03 (L) 1.13 - 1.33 mmol/L   Lactic Acid, Plasma    Collection Time: 12/09/19  5:23 PM   Result Value Ref Range    Lactic Acid NOT REPORTED mmol/L    Lactic Acid, Whole Blood 6.0 (H) 0.7 - 2.1 mmol/L   Lactic Acid, Whole Blood    Collection Time: 12/09/19  5:40 PM   Result Value Ref Range    Lactic Acid, Whole Blood 6.0 (H) 0.7 - 2.1 mmol/L   POC Glucose Fingerstick    Collection Time: 12/09/19  7:54 PM   Result Value Ref Range    POC Glucose 90 65 - 105 mg/dL   Arterial Blood Gas, POC    Collection Time: 12/09/19  9:26 PM   Result Value Ref Range    POC pH 7.440 7.350 - 7.450    POC pCO2 32.8 (L) 35.0 - 48.0 mm Hg    POC PO2 90.2 83.0 - 108.0 mm Hg    POC HCO3 22.2 21.0 - 28.0 mmol/L    TCO2 (calc), Art 23 22.0 - 29.0 mmol/L    Negative Base Excess, Art 1 0.0 - 2.0    Positive Base Excess, Art NOT REPORTED 0.0 - 3.0    POC O2 SAT 97 94.0 - 98.0 %    O2 Device/Flow/% Adult Ventilator     Erick Test NOT APPLICABLE     Sample Site Arterial Line     Mode NOT REPORTED     FIO2 40.0     Pt Temp NOT REPORTED     POC pH Temp NOT REPORTED     POC pCO2 Temp NOT REPORTED mm Hg    POC pO2 Temp NOT REPORTED mm Hg   Basic Metabolic Panel    Collection Time: 12/09/19 11:30 PM   Result Value Ref Range    Glucose 110 (H) 70 - 99 mg/dL    BUN 32 (H) 6 - 20 mg/dL    CREATININE 1.95 (H) 0.50 - 0.90 mg/dL    Bun/Cre Ratio NOT REPORTED 9 - 20    Calcium 8.6 8.6 - 10.4 mg/dL    Sodium 139 135 - 144 mmol/L    Potassium 3.6 (L) 3.7 - 5.3 mmol/L    Chloride 103 98 - 107 mmol/L    CO2 21 20 - 31 mmol/L    Anion Gap 15 9 - 17 mmol/L    GFR Non-African American 29 (L) >60 mL/min    GFR  35 (L) >60 mL/min    GFR Comment          GFR Staging NOT REPORTED    Calcium, Ionized    Collection Time: 12/09/19 11:30 PM   Result Value Ref Range    Calcium, Ion 1.07 (L) 1.13 - 1.33 mmol/L   Lactic Acid, Plasma    Collection Time: 12/09/19 11:30 PM   Result Value Ref Range    Lactic Acid NOT REPORTED mmol/L    Lactic Acid, Whole Blood 2.7 (H) 0.7 - 2.1 mmol/L   Magnesium    Collection Time: 12/09/19 11:30 PM Result Value Ref Range    Magnesium 1.5 (L) 1.6 - 2.6 mg/dL   Phosphorus    Collection Time: 12/09/19 11:30 PM   Result Value Ref Range    Phosphorus 3.4 2.6 - 4.5 mg/dL   Arterial Blood Gas, POC    Collection Time: 12/10/19 12:32 AM   Result Value Ref Range    POC pH 7.397 7.350 - 7.450    POC pCO2 40.4 35.0 - 48.0 mm Hg    POC PO2 49.7 (LL) 83.0 - 108.0 mm Hg    POC HCO3 24.8 21.0 - 28.0 mmol/L    TCO2 (calc), Art 26 22.0 - 29.0 mmol/L    Negative Base Excess, Art NOT REPORTED 0.0 - 2.0    Positive Base Excess, Art 0 0.0 - 3.0    POC O2 SAT 85 (L) 94.0 - 98.0 %    O2 Device/Flow/% Adult Ventilator     Erick Test NOT APPLICABLE     Sample Site Arterial Line     Mode NOT REPORTED     FIO2 50.0     Pt Temp 32.9     POC pH Temp 7.46     POC pCO2 Temp 34 mm Hg    POC pO2 Temp 37 mm Hg   Hemoglobin and hematocrit, blood    Collection Time: 12/10/19 12:32 AM   Result Value Ref Range    POC Hemoglobin 13.3 12.0 - 16.0 g/dL    POC Hematocrit 39 36 - 46 %   Creatinine W/GFR Point of Care    Collection Time: 12/10/19 12:32 AM   Result Value Ref Range    POC Creatinine 2.00 (H) 0.51 - 1.19 mg/dL    GFR Comment 34 (L) >60 mL/min    GFR Non- 28 (L) >60 mL/min    GFR Comment         SODIUM (POC)    Collection Time: 12/10/19 12:32 AM   Result Value Ref Range    POC Sodium 140 138 - 146 mmol/L   POTASSIUM (POC)    Collection Time: 12/10/19 12:32 AM   Result Value Ref Range    POC Potassium 3.0 (L) 3.5 - 4.5 mmol/L   CHLORIDE (POC)    Collection Time: 12/10/19 12:32 AM   Result Value Ref Range    POC Chloride 102 98 - 107 mmol/L   CALCIUM, IONIC (POC)    Collection Time: 12/10/19 12:32 AM   Result Value Ref Range    POC Ionized Calcium 1.06 (L) 1.15 - 1.33 mmol/L   Lactic Acid, POC    Collection Time: 12/10/19 12:32 AM   Result Value Ref Range    POC Lactic Acid 2.08 (H) 0.56 - 1.39 mmol/L   POCT Glucose    Collection Time: 12/10/19 12:32 AM   Result Value Ref Range    POC Glucose 108 (H) 74 - 100 mg/dL   Anion Gap (Calc) POC    Collection Time: 12/10/19 12:32 AM   Result Value Ref Range    Anion Gap 13 7 - 16 mmol/L   Arterial Blood Gas, POC    Collection Time: 12/10/19  5:51 AM   Result Value Ref Range    POC pH 7.297 (L) 7.350 - 7.450    POC pCO2 51.9 (H) 35.0 - 48.0 mm Hg    POC PO2 314.3 (H) 83.0 - 108.0 mm Hg    POC HCO3 25.4 21.0 - 28.0 mmol/L    TCO2 (calc), Art 27 22.0 - 29.0 mmol/L    Negative Base Excess, Art 2 0.0 - 2.0    Positive Base Excess, Art NOT REPORTED 0.0 - 3.0    POC O2  (H) 94.0 - 98.0 %    O2 Device/Flow/% Adult Ventilator     Erick Test NOT APPLICABLE     Sample Site Arterial Line     Mode NOT REPORTED     FIO2 100.0     Pt Temp NOT REPORTED     POC pH Temp NOT REPORTED     POC pCO2 Temp NOT REPORTED mm Hg    POC pO2 Temp NOT REPORTED mm Hg   HEPATIC FUNCTION PANEL    Collection Time: 12/10/19  6:00 AM   Result Value Ref Range    Alb 2.9 (L) 3.5 - 5.2 g/dL    Alkaline Phosphatase 69 35 - 104 U/L    ALT 1,099 (H) 5 - 33 U/L    AST 1,430 (H) <32 U/L    Total Bilirubin 0.49 0.3 - 1.2 mg/dL    Bilirubin, Direct 0.24 <0.31 mg/dL    Bilirubin, Indirect 0.25 0.00 - 1.00 mg/dL    Total Protein 4.9 (L) 6.4 - 8.3 g/dL    Globulin NOT REPORTED 1.5 - 3.8 g/dL    Albumin/Globulin Ratio 1.5 1.0 - 2.5   PROTIME-INR    Collection Time: 12/10/19  6:00 AM   Result Value Ref Range    Protime 13.3 (H) 9.0 - 12.0 sec    INR 1.3    Basic Metabolic Panel    Collection Time: 12/10/19  6:00 AM   Result Value Ref Range    Glucose 128 (H) 70 - 99 mg/dL    BUN 36 (H) 6 - 20 mg/dL    CREATININE 2.30 (H) 0.50 - 0.90 mg/dL    Bun/Cre Ratio NOT REPORTED 9 - 20    Calcium 7.6 (L) 8.6 - 10.4 mg/dL    Sodium 140 135 - 144 mmol/L    Potassium 3.7 3.7 - 5.3 mmol/L    Chloride 101 98 - 107 mmol/L    CO2 21 20 - 31 mmol/L    Anion Gap 18 (H) 9 - 17 mmol/L    GFR Non-African American 24 (L) >60 mL/min    GFR  29 (L) >60 mL/min    GFR Comment          GFR Staging NOT REPORTED    Calcium, Ionized    Collection Time: 12/10/19  6:00 AM   Result Value Ref Range    Calcium, Ion 1.04 (L) 1.13 - 1.33 mmol/L   Magnesium    Collection Time: 12/10/19  6:00 AM   Result Value Ref Range    Magnesium 1.7 1.6 - 2.6 mg/dL   Phosphorus    Collection Time: 12/10/19  6:00 AM   Result Value Ref Range    Phosphorus 4.7 (H) 2.6 - 4.5 mg/dL   POC Glucose Fingerstick    Collection Time: 12/10/19  8:10 AM   Result Value Ref Range    POC Glucose 125 (H) 65 - 105 mg/dL   Magnesium    Collection Time: 12/10/19  8:30 AM   Result Value Ref Range    Magnesium 1.6 1.6 - 2.6 mg/dL   Lactic Acid, Plasma    Collection Time: 12/10/19  8:30 AM   Result Value Ref Range    Lactic Acid NOT REPORTED mmol/L    Lactic Acid, Whole Blood 2.7 (H) 0.7 - 2.1 mmol/L   Calcium, Ionized    Collection Time: 12/10/19  8:30 AM   Result Value Ref Range    Calcium, Ion 1.04 (L) 1.13 - 1.33 mmol/L   Basic Metabolic Panel    Collection Time: 12/10/19  8:30 AM   Result Value Ref Range    Glucose 124 (H) 70 - 99 mg/dL    BUN 37 (H) 6 - 20 mg/dL    CREATININE 2.34 (H) 0.50 - 0.90 mg/dL    Bun/Cre Ratio NOT REPORTED 9 - 20    Calcium 7.6 (L) 8.6 - 10.4 mg/dL    Sodium 136 135 - 144 mmol/L    Potassium 3.7 3.7 - 5.3 mmol/L    Chloride 100 98 - 107 mmol/L    CO2 20 20 - 31 mmol/L    Anion Gap 16 9 - 17 mmol/L    GFR Non-African American 23 (L) >60 mL/min    GFR  28 (L) >60 mL/min    GFR Comment          GFR Staging NOT REPORTED    CBC WITH AUTO DIFFERENTIAL    Collection Time: 12/10/19  8:30 AM   Result Value Ref Range    WBC 5.7 3.5 - 11.3 k/uL    RBC 4.64 3.95 - 5.11 m/uL    Hemoglobin 14.6 11.9 - 15.1 g/dL    Hematocrit 43.2 36.3 - 47.1 %    MCV 93.1 82.6 - 102.9 fL    MCH 31.5 25.2 - 33.5 pg    MCHC 33.8 28.4 - 34.8 g/dL    RDW 13.5 11.8 - 14.4 %    Platelets 790 900 - 829 k/uL    MPV 10.5 8.1 - 13.5 fL    NRBC Automated 0.0 0.0 per 100 WBC    Differential Type NOT REPORTED     WBC Morphology NOT REPORTED     RBC Morphology NOT REPORTED     Platelet Estimate NOT REPORTED     Seg Neutrophils 83 (H) 36 - 65 %    Lymphocytes 13 (L) 24 - 43 %    Monocytes 2 (L) 3 - 12 %    Eosinophils % 1 1 - 4 %    Basophils 0 0 - 2 %    Immature Granulocytes 0 0 %    Segs Absolute 4.75 1.50 - 8.10 k/uL    Absolute Lymph # 0.74 (L) 1.10 - 3.70 k/uL    Absolute Mono # 0.10 0.10 - 1.20 k/uL    Absolute Eos # 0.08 0.00 - 0.44 k/uL    Basophils Absolute <0.03 0.00 - 0.20 k/uL    Absolute Immature Granulocyte <0.03 0.00 - 0.30 k/uL       IMAGING    Ct Head Wo Contrast    Result Date: 12/9/2019  Diffuse cerebral edema consistent with anoxic brain injury. No acute hemorrhage. Xr Chest Portable    Result Date: 12/9/2019  Life-support devices as above. Bilateral subsegmental atelectasis. No lobar consolidation. IMPRESSION     Case of a 67-year-old female sent after cardiac arrest with multiple rounds of CPR found to have an anoxic brain injury on CT. Neurology consulted for seizure-like activity, likely myoclonic jerking given CT head findings. RECOMMENDATIONS:   1. CTH indicating severe anoxic brain injury  2. Long-term video monitoring EEG  1. No activity with 1 hour burst suppression noted. Poor prognosis. 2. 72 hours for prognostication   3. Keppra 500mg IV BID  4. Neuro protocols per unit  5. Patient underwent emergent cardiac cath found to be unremarkable  6. UDS -cocaine positive  7. Recommend spiritual and palliative consult   8. Spoke to family again today extensively about prognosis and vEEG findings. Thank you for the consultation. Will follow. Case consulted with Dr. Soledad Tuttle.       Betty Loyd MD, R[facility]GE  PGY-2 Neurology  12/10/2019 at 10:47 AM

## 2019-12-10 NOTE — FLOWSHEET NOTE
made follow up visit when family arrived. Family was calm and very concerned about pt's condition. Family and  had prayer and conversations about changing pt's status continues.  was active support. Shagufta Singer will continue to follow up and offer family support during this difficult time.

## 2019-12-10 NOTE — PLAN OF CARE
Problem: OXYGENATION/RESPIRATORY FUNCTION  Goal: Patient will maintain patent airway  12/10/2019 0624 by Paola Sher RN  Outcome: Ongoing  12/9/2019 1751 by Tony Alvarado RN  Outcome: Ongoing  Goal: Patient will achieve/maintain normal respiratory rate/effort  Description  Respiratory rate and effort will be within normal limits for the patient  12/10/2019 6658 by Paola Sher RN  Outcome: Ongoing  12/9/2019 1751 by Tony Alvarado RN  Outcome: Ongoing     Problem: MECHANICAL VENTILATION  Goal: Patient will maintain patent airway  12/10/2019 0624 by Paola Sher RN  Outcome: Ongoing  12/9/2019 1751 by Tony Alvarado RN  Outcome: Ongoing  Goal: Oral health is maintained or improved  12/10/2019 0624 by Paola Sher RN  Outcome: Ongoing  12/9/2019 1751 by Tony Alvarado RN  Outcome: Ongoing  Goal: ET tube will be managed safely  12/10/2019 0624 by Paola Sher RN  Outcome: Ongoing  12/9/2019 1751 by Tony Alvarado RN  Outcome: Ongoing  Goal: Ability to express needs and understand communication  12/10/2019 0624 by Paola Sher RN  Outcome: Ongoing  12/9/2019 1751 by Tony Alvarado RN  Outcome: Ongoing  Goal: Mobility/activity is maintained at optimum level for patient  12/10/2019 7691 by Paola Sher RN  Outcome: Ongoing  12/9/2019 1751 by Tony Alvarado RN  Outcome: Ongoing     Problem: SKIN INTEGRITY  Goal: Skin integrity is maintained or improved  12/10/2019 0624 by Paola Sher RN  Outcome: Ongoing  12/9/2019 1751 by Tony Alvarado RN  Outcome: Ongoing     Problem: Risk for Impaired Skin Integrity  Goal: Tissue integrity - skin and mucous membranes  Description  Structural intactness and normal physiological function of skin and  mucous membranes.   Outcome: Ongoing     Problem: Falls - Risk of:  Goal: Will remain free from falls  Description  Will remain free from falls  Outcome: Ongoing  Goal: Absence of physical injury  Description  Absence of physical injury  Outcome: Ongoing

## 2019-12-10 NOTE — FLOWSHEET NOTE
Assessment:  Spoke to daughter Maegan Perez ([phone]) who states that she is about two hours away from [facility]. [facility] and on her way. Informed daughter that physicians will want to speak with her upon arrival.  Daughter seems calm and collected regarding the situation. Intervention:   attempted to provide space for daughter to express feelings, thoughts, and concerns. Determined that family support is available. Outcome:  Oldest daughter will be arriving within two hours. 12/09/19 1917   Encounter Summary   Services provided to: Family   Referral/Consult From: Other    Support System Children   Continue Visiting   (12.9.2019)   Complexity of Encounter Moderate   Length of Encounter 15 minutes   Spiritual Assessment Completed Yes   Routine   Type Initial   Assessment Calm; Approachable;Coping   Intervention Active listening;Explored feelings, thoughts, concerns;Explored coping resources; Discussed illness/injury and it's impact   Outcome Expressed gratitude     Electronically signed by Jacy Hogan on 12/9/2019 at 7:20 PM

## 2019-12-11 NOTE — FLOWSHEET NOTE
[address] [facility] [address]  PROGRESS NOTE      Room # 0115/0115-01   Name: Darin Pittman            Age: 46 y.o. Gender: female          Quaker: Unknown       Reason for Visit: Rounding      Overview and Assessment  Darin Pittman is a 46 y.o. female, intubated and non-responsive. Her sister, daughter, and friend were at bedside. Their understanding is that there is no significant brain activity, and in their words, Will Bartlett is already gone. \"  There is an added layer of suffering grief because they say it was a result of drug addiction. Interventions   provided emotional support and prayer at sister's request.    Additional Information  [facility] is monitoring patient as well  They found the serenity prayer especially meaningful    Outcome and Plan  Family grateful for visit and support.  made referral to follow for possible extubation. Follow as needed/requested. 12/11/19 1100   Encounter Summary   Services provided to: Patient and family together   Referral/Consult From: [facility] Family members;Friends/neighbors   Continue Visiting   (12/11/2019)   Complexity of Encounter Moderate   Length of Encounter 45 minutes   Spiritual Assessment Completed Yes   Routine   Type Follow up   Assessment Unable to respond; Anxious;Grieving   Intervention Explored feelings, thoughts, concerns;Sustaining presence/ Ministry of presence;Prayer   Outcome Acceptance;Expressed gratitude; De-escalated; Tearful;Engaged in conversation       Electronically signed by Talha Beckham on 12/11/2019 at 2:52 PM.  [facility]  [phone]

## 2019-12-11 NOTE — PLAN OF CARE
Problem: OXYGENATION/RESPIRATORY FUNCTION  Goal: Patient will maintain patent airway  Outcome: Ongoing  Goal: Patient will achieve/maintain normal respiratory rate/effort  Respiratory rate and effort will be within normal limits for the patient   Outcome: Ongoing     Problem: MECHANICAL VENTILATION  Goal: Patient will maintain patent airway  Outcome: Ongoing  Goal: Oral health is maintained or improved  Outcome: Ongoing  Goal: ET tube will be managed safely  Outcome: Ongoing  Goal: Ability to express needs and understand communication  Outcome: Ongoing  Goal: Mobility/activity is maintained at optimum level for patient  Outcome: Ongoing     Problem: ASPIRATION PRECAUTIONS  Goal: Patients risk of aspiration is minimized  Outcome: Ongoing     Problem: SKIN INTEGRITY  Goal: Skin integrity is maintained or improved  Outcome: Ongoing     Problem: RESPIRATORY  Intervention: Respiratory assessment  BRONCHOSPASM/BRONCHOCONSTRICTION       [X]  IMPROVE AERATION/BREATH SOUNDS  [X]        ADMINISTER BRONCHODILATOR THERAPY AS APPROPRIATE  [X]        ASSESS BREATH SOUNDS  [ ]         IMPLEMENT AEROSOL/MDI PROTOCOL  [X]        PATIENT EDUCATION AS NEEDED

## 2019-12-11 NOTE — PROGRESS NOTES
5 % 1,000 mL infusion, , Intravenous, Continuous  sodium chloride flush 0.9 % injection 10 mL, 10 mL, Intravenous, 2 times per day  sodium chloride flush 0.9 % injection 10 mL, 10 mL, Intravenous, PRN  acetaminophen (TYLENOL) tablet 650 mg, 650 mg, Oral, Q4H PRN  magnesium hydroxide (MILK OF MAGNESIA) 400 MG/5ML suspension 30 mL, 30 mL, Oral, Daily PRN  ondansetron (ZOFRAN) injection 4 mg, 4 mg, Intravenous, Q6H PRN  chlorhexidine (PERIDEX) 0.12 % solution 15 mL, 15 mL, Mouth/Throat, BID  potassium chloride 20 mEq/50 mL IVPB (Central Line), 20 mEq, Intravenous, PRN  norepinephrine (LEVOPHED) 16 mg in dextrose 5% 250 mL infusion, 2 mcg/min, Intravenous, Continuous  famotidine (PEPCID) injection 20 mg, 20 mg, Intravenous, BID  heparin (porcine) injection 5,000 Units, 5,000 Units, Subcutaneous, 3 times per day  [COMPLETED] amiodarone (CORDARONE) 150 mg in dextrose 5 % 100 mL bolus, 150 mg, Intravenous, Once **FOLLOWED BY** [] amiodarone (CORDARONE) 450 mg in dextrose 5 % 250 mL infusion, 1 mg/min, Intravenous, Continuous **FOLLOWED BY** amiodarone (CORDARONE) 450 mg in dextrose 5 % 250 mL infusion, 0.5 mg/min, Intravenous, Continuous  insulin lispro (HUMALOG) injection vial 0-18 Units, 0-18 Units, Subcutaneous, TID WC  insulin lispro (HUMALOG) injection vial 0-9 Units, 0-9 Units, Subcutaneous, Nightly  Allergies:  Patient has no allergy information on record. Social History:  TOBACCO:   has no history on file for tobacco.  ETOH:   has no history on file for alcohol. DRUGS:   has no history on file for drug. ACTIVITIES OF DAILY LIVING:    INSTRUMENTAL ACTIVITIES OF DAILY LIVING:  Patient is able to perform all instrumental activities of daily living. Family History:   No family history on file.     REVIEW OF SYSTEMS:  Review of Systems   Unable to perform ROS: Intubated       PHYSICAL EXAM:    Vitals:  BP (!) 104/58   Pulse 90   Temp 98.1 °F (36.7 °C) (Core)   Resp 24   Wt 198 lb 3.1 oz (89.9 kg) SpO2 97%        Physical Exam  Constitutional:       Interventions: She is intubated and restrained. HENT:      Head: Atraumatic. Cardiovascular:      Rate and Rhythm: Tachycardia present. Rhythm irregular. Pulmonary:      Effort: She is intubated. Comments: Intubated  Neurological:      Mental Status: She is unresponsive. GCS: GCS eye subscore is 1. GCS verbal subscore is 1. GCS motor subscore is 1. Cranial Nerves: No facial asymmetry. Motor: Seizure activity present. Deep Tendon Reflexes:      Reflex Scores:       Tricep reflexes are 2+ on the right side and 2+ on the left side. Bicep reflexes are 2+ on the right side and 2+ on the left side. Brachioradialis reflexes are 2+ on the right side and 2+ on the left side. Patellar reflexes are 1+ on the right side and 1+ on the left side.      Comments: Oculocephalic reflex not intact  Corneal reflex not intact  Pupils dilated bilaterally non-reactive  No gag or cough reflex  No withdrawal to pain in all 4 extremities  Not breathing over the ventilator                   DATA  Recent Results (from the past 24 hour(s))   POTASSIUM    Collection Time: 12/10/19 10:30 AM   Result Value Ref Range    Potassium 4.1 3.7 - 5.3 mmol/L   POC Glucose Fingerstick    Collection Time: 12/10/19 12:00 PM   Result Value Ref Range    POC Glucose 99 65 - 105 mg/dL   Arterial Blood Gas, POC    Collection Time: 12/10/19 12:03 PM   Result Value Ref Range    POC pH 7.334 (L) 7.350 - 7.450    POC pCO2 42.0 35.0 - 48.0 mm Hg    POC PO2 186.4 (H) 83.0 - 108.0 mm Hg    POC HCO3 22.4 21.0 - 28.0 mmol/L    TCO2 (calc), Art 24 22.0 - 29.0 mmol/L    Negative Base Excess, Art 3 (H) 0.0 - 2.0    Positive Base Excess, Art NOT REPORTED 0.0 - 3.0    POC O2  (H) 94.0 - 98.0 %    O2 Device/Flow/% NOT REPORTED     Erick Test NOT REPORTED     Sample Site NOT REPORTED     Mode NOT REPORTED     FIO2 NOT REPORTED     Pt Temp 34.2     POC pH Temp 7.37     POC pCO2 Temp 37 mm Hg    POC pO2 Temp 172 mm Hg   Phosphorus    Collection Time: 12/10/19 12:13 PM   Result Value Ref Range    Phosphorus 4.8 (H) 2.6 - 4.5 mg/dL   Magnesium    Collection Time: 12/10/19 12:13 PM   Result Value Ref Range    Magnesium 1.5 (L) 1.6 - 2.6 mg/dL   Lactic Acid, Plasma    Collection Time: 12/10/19 12:13 PM   Result Value Ref Range    Lactic Acid NOT REPORTED mmol/L    Lactic Acid, Whole Blood 3.2 (H) 0.7 - 2.1 mmol/L   Calcium, Ionized    Collection Time: 12/10/19 12:13 PM   Result Value Ref Range    Calcium, Ion 1.04 (L) 1.13 - 1.33 mmol/L   Basic Metabolic Panel    Collection Time: 12/10/19 12:13 PM   Result Value Ref Range    Glucose 110 (H) 70 - 99 mg/dL    BUN 41 (H) 6 - 20 mg/dL    CREATININE 2.58 (H) 0.50 - 0.90 mg/dL    Bun/Cre Ratio NOT REPORTED 9 - 20    Calcium 8.0 (L) 8.6 - 10.4 mg/dL    Sodium 135 135 - 144 mmol/L    Potassium 3.7 3.7 - 5.3 mmol/L    Chloride 97 (L) 98 - 107 mmol/L    CO2 20 20 - 31 mmol/L    Anion Gap 18 (H) 9 - 17 mmol/L    GFR Non-African American 21 (L) >60 mL/min    GFR  25 (L) >60 mL/min    GFR Comment          GFR Staging NOT REPORTED    POTASSIUM    Collection Time: 12/10/19  1:56 PM   Result Value Ref Range    Potassium 3.8 3.7 - 5.3 mmol/L   POTASSIUM    Collection Time: 12/10/19  4:07 PM   Result Value Ref Range    Potassium 3.7 3.7 - 5.3 mmol/L   POC Glucose Fingerstick    Collection Time: 12/10/19  4:19 PM   Result Value Ref Range    POC Glucose 91 65 - 105 mg/dL   Phosphorus    Collection Time: 12/10/19  5:58 PM   Result Value Ref Range    Phosphorus 4.8 (H) 2.6 - 4.5 mg/dL   Magnesium    Collection Time: 12/10/19  5:58 PM   Result Value Ref Range    Magnesium 1.9 1.6 - 2.6 mg/dL   Lactic Acid, Plasma    Collection Time: 12/10/19  5:58 PM   Result Value Ref Range    Lactic Acid NOT REPORTED mmol/L    Lactic Acid, Whole Blood 3.2 (H) 0.7 - 2.1 mmol/L   Calcium, Ionized    Collection Time: 12/10/19  5:58 PM   Result Value Ref Range Calcium, Ion 1.07 (L) 1.13 - 1.33 mmol/L   Basic Metabolic Panel    Collection Time: 12/10/19  5:58 PM   Result Value Ref Range    Glucose 106 (H) 70 - 99 mg/dL    BUN 45 (H) 6 - 20 mg/dL    CREATININE 2.77 (H) 0.50 - 0.90 mg/dL    Bun/Cre Ratio NOT REPORTED 9 - 20    Calcium 8.1 (L) 8.6 - 10.4 mg/dL    Sodium 133 (L) 135 - 144 mmol/L    Potassium 3.8 3.7 - 5.3 mmol/L    Chloride 95 (L) 98 - 107 mmol/L    CO2 20 20 - 31 mmol/L    Anion Gap 18 (H) 9 - 17 mmol/L    GFR Non-African American 19 (L) >60 mL/min    GFR  23 (L) >60 mL/min    GFR Comment          GFR Staging NOT REPORTED    POTASSIUM    Collection Time: 12/10/19  8:16 PM   Result Value Ref Range    Potassium 4.1 3.7 - 5.3 mmol/L   POC Glucose Fingerstick    Collection Time: 12/10/19  9:11 PM   Result Value Ref Range    POC Glucose 96 65 - 105 mg/dL   Arterial Blood Gas, POC    Collection Time: 12/10/19 10:53 PM   Result Value Ref Range    POC pH 7.435 7.350 - 7.450    POC pCO2 30.7 (L) 35.0 - 48.0 mm Hg    POC PO2 235.9 (H) 83.0 - 108.0 mm Hg    POC HCO3 20.6 (L) 21.0 - 28.0 mmol/L    TCO2 (calc), Art 22 22.0 - 29.0 mmol/L    Negative Base Excess, Art 3 (H) 0.0 - 2.0    Positive Base Excess, Art NOT REPORTED 0.0 - 3.0    POC O2  (H) 94.0 - 98.0 %    O2 Device/Flow/% Adult Ventilator     Erick Test NOT APPLICABLE     Sample Site Arterial Line     Mode NOT REPORTED     FIO2 60.0     Pt Temp NOT REPORTED     POC pH Temp NOT REPORTED     POC pCO2 Temp NOT REPORTED mm Hg    POC pO2 Temp NOT REPORTED mm Hg   POCT Glucose    Collection Time: 12/10/19 10:53 PM   Result Value Ref Range    POC Glucose 81 74 - 100 mg/dL   Calcium, Ionized    Collection Time: 12/11/19 12:00 AM   Result Value Ref Range    Calcium, Ion 1.11 (L) 1.13 - 1.33 mmol/L   Phosphorus    Collection Time: 12/11/19 12:01 AM   Result Value Ref Range    Phosphorus 4.4 2.6 - 4.5 mg/dL   Magnesium    Collection Time: 12/11/19 12:01 AM   Result Value Ref Range    Magnesium 1.6 1.6

## 2019-12-11 NOTE — PROGRESS NOTES
Apnea test ordered. 1540  ABG drawn ans recorded and reported. Decrease the rate to 22 and increse the Fio2 to 100%. Redraw abg in 20 minutes. 1603  ABG drawn    1608  ABG resutls     1609  Placed on 6 lpm to ETT    1619  ABG results show PaCo2 to be greater than 85mmHg. 1620  Returned to vent on rate of.

## 2019-12-11 NOTE — PLAN OF CARE
Problem: OXYGENATION/RESPIRATORY FUNCTION  Goal: Patient will maintain patent airway  Outcome: Ongoing  Goal: Patient will achieve/maintain normal respiratory rate/effort  Description  Respiratory rate and effort will be within normal limits for the patient  Outcome: Ongoing     Problem: MECHANICAL VENTILATION  Goal: Patient will maintain patent airway  Outcome: Ongoing  Goal: Oral health is maintained or improved  Outcome: Ongoing  Goal: ET tube will be managed safely  Outcome: Ongoing  Goal: Ability to express needs and understand communication  Outcome: Ongoing  Goal: Mobility/activity is maintained at optimum level for patient  Outcome: Ongoing     Problem: SKIN INTEGRITY  Goal: Skin integrity is maintained or improved  Outcome: Ongoing     Problem: Risk for Impaired Skin Integrity  Goal: Tissue integrity - skin and mucous membranes  Description  Structural intactness and normal physiological function of skin and  mucous membranes.   Outcome: Ongoing     Problem: Falls - Risk of:  Goal: Will remain free from falls  Description  Will remain free from falls  Outcome: Ongoing  Goal: Absence of physical injury  Description  Absence of physical injury  Outcome: Ongoing     Problem: Nutrition  Goal: Optimal nutrition therapy  Outcome: Ongoing

## 2019-12-11 NOTE — PLAN OF CARE
BRONCHOSPASM/BRONCHOCONSTRICTION     [x]         IMPROVE AERATION/BREATH SOUNDS  [x]   ADMINISTER BRONCHODILATOR THERAPY AS APPROPRIATE  [x]   ASSESS BREATH SOUNDS  [x]   IMPLEMENT AEROSOL/MDI PROTOCOL  [x]   PATIENT EDUCATION AS NEEDED   Ventilator Bronchodilator assessment    Breath sounds: clear  Inspiratory Pressure: 20  Plateau Pressure: 17    Patient assessed at level 1          [x]    Bronchodilator Assessment    BRONCHODILATOR ASSESSMENT SCORE  Score 0 (Home) 1 2 3 4   Breath Sounds   []  Chronic Ventilator: Patient at baseline [x]  Mild Wheezes/ Clear []  Intermittent wheezes with good air entry []  Bilateral/unilateral wheezing with diminished air entry []  Insp/Exp wheeze and/or poor aeration   Ventilator Pressures   []  Chronic Ventilator [x]  Insp. Pressure less than 25 cm H20 [x]  Insp. Pressure less than 25 cm H20 []  Insp. Pressure exceeds 25 cm H20 []  Insp.  Pressure exceeds 30 cm H20   Plateau Pressure []  NA   [x]  Plateau Pressure less than 4  []  Plateau Pressure less than or equal to 5 []  Plateau Pressure greater than or equal to 6 []  Plateau Pressure greater than or equal to [address] [facility] [place]  7:35 AM

## 2019-12-11 NOTE — PROGRESS NOTES
Dr. Kayla Trinidad at bedside for apnea test. Brain death time called at 16:19. RN at bedside along with Maylin,RRT.

## 2019-12-11 NOTE — PROGRESS NOTES
Patient was rewarmed last night. She has not been on any sedation. She is currently not breathing over the set rate on ventilator. Absent corneal, gag, cough reflexes, doll's eyes absent. She does not respond to any painful stimuli. Neurology attending examined the patient independently and concurs with findings.   We will proceed with apnea test.    Marc Brown MD

## 2019-12-11 NOTE — PROGRESS NOTES
Critical Care Team - Daily Progress Note      Date and time: 2019 7:20 AM  Patient's name:  Terrial Cranker  Medical Record Number: [id number]  Patient's account/billing number: [de-identified]  Patient's YOB: 1980  Age: 46 y.o. Date of Admission: 2019  4:57 AM  Length of stay during current admission: 2      Primary Care Physician: Vinicio Motley PA-C  ICU Attending Physician: Dr. Perez Payment Status: Full Code    Reason for ICU admission: Cardiac arrest      SUBJECTIVE:     OVERNIGHT EVENTS:       Patient seen and evalauted. Rewarmed yesterday. Planning on brain death protocol after 24h rewarmed     Fever:-  Temp (24hrs), Av °F (35.6 °C), Min:91.8 °F (33.2 °C), Max:97.9 °F (36.6 °C)      No data recorded. No data recorded. Urine output in the last 24 hours:  In: 2172.5 [I.V.:2172.5]  Out: 809 [Urine:759]    Patient Vitals for the past 96 hrs (Last 3 readings):   Weight   19 0600 198 lb 3.1 oz (89.9 kg)   12/10/19 0500 194 lb 10.7 oz (88.3 kg)   19 1000 183 lb 3.2 oz (83.1 kg)       AWAKE & FOLLOWING COMMANDS:  [x] No   [] Yes    CURRENT VENTILATION STATUS:     [x] Ventilator  [] BIPAP  [] Nasal Cannula [] Room Air        SECRETIONS Amount:  [] Small [] Moderate  [] Large  [] None  Color:     [] White [] Colored  [] Bloody    SEDATION:  RAAS Score:  [] Propofol gtt  [] Versed gtt  [] Ativan gtt   [x] No Sedation    PARALYZED:  [x] No    [] Yes    DIARRHEA:                [x] No                [] Yes  (C. Difficile status: [] positive                                                                                                                       [] negative                                                                                                                     [] pending)    VASOPRESSORS:  [] No    [x] Yes    If yes -   [x] Levophed       [] Dopamine     [] Vasopressin       [] Dobutamine  [] Phenylephrine         [] Epinephrine    CENTRAL LINES: [] No   [x] Yes   (Date of Insertion:   )           If yes -     [] Right IJ     [] Left IJ [] Right Femoral [] Left Femoral                   [] Right Subclavian [] Left Subclavian       YODER'S CATHETER:   [] No   [x] Yes  (Date of Insertion:   )     URINE OUTPUT:            [] Good   [x] Low              [] Anuric      OBJECTIVE:     VITAL SIGNS:  BP (!) 109/59   Pulse 92   Temp 97.7 °F (36.5 °C) (Esophageal)   Resp 28   Wt 198 lb 3.1 oz (89.9 kg)   SpO2 99%   Tmax over 24 hours:  Temp (24hrs), Av °F (35.6 °C), Min:91.8 °F (33.2 °C), Max:97.9 °F (36.6 °C)      Patient Vitals for the past 6 hrs:   Temp Temp src Pulse Resp SpO2 Weight   19 0600 -- -- -- -- -- 198 lb 3.1 oz (89.9 kg)   19 0400 97.7 °F (36.5 °C) Esophageal -- -- -- --   19 0251 -- -- 92 28 99 % --   19 0145 -- -- 93 (!) 0 97 % --   19 0130 -- -- 93 (!) 0 97 % --         Intake/Output Summary (Last 24 hours) at 2019 0720  Last data filed at 2019 0400  Gross per 24 hour   Intake 23335.05 ml   Output 836 ml   Net 60514.05 ml     Wt Readings from Last 2 Encounters:   19 198 lb 3.1 oz (89.9 kg)     There is no height or weight on file to calculate BMI. PHYSICAL EXAMINATION:    General appearance - Intubated unresponsive. Mental status -pupils dilated, non reactive, corneals absent, gag absent, cough absent.   Chest - clear to auscultation, no wheezes, rales or rhonchi, symmetric air entry  Heart - normal rate, regular rhythm, normal S1, S2, no murmurs, rubs, clicks or gallops  Abdomen - soft, nontender, nondistended, no masses or organomegaly  Neurological - alert, oriented, normal speech, no focal findings or movement disorder noted}  Extremities - peripheral pulses normal, no pedal edema, no clubbing or cyanosis  Skin - normal coloration and turgor, no rashes, no suspicious skin lesions noted      Any additional physical findings:    MEDICATIONS:    Scheduled Meds:   calcium gluconate IVPB 2 g Intravenous Once    levetiracetam  500 mg Intravenous Q12H    lubrifresh P.M.    Both Eyes Q4H    sodium chloride flush  10 mL Intravenous 2 times per day    chlorhexidine  15 mL Mouth/Throat BID    famotidine (PEPCID) injection  20 mg Intravenous BID    heparin (porcine)  5,000 Units Subcutaneous 3 times per day    insulin lispro  0-18 Units Subcutaneous TID WC    insulin lispro  0-9 Units Subcutaneous Nightly     Continuous Infusions:   vasopressin (Septic Shock) infusion      sodium chloride 50 mL/hr at 12/10/19 0952    sodium bicarbonate infusion 50 mL/hr at 12/11/19 0028    norepinephrine 10 mcg/min (12/11/19 0045)    amiodarone 0.5 mg/min (12/11/19 0230)     PRN Meds:   sodium chloride flush, 10 mL, PRN  acetaminophen, 650 mg, Q4H PRN  magnesium hydroxide, 30 mL, Daily PRN  ondansetron, 4 mg, Q6H PRN  potassium chloride, 20 mEq, PRN          VENT SETTINGS (Comprehensive) (if applicable):  Vent Information  $Ventilation: $Subsequent Day  Ventilator Started: Yes  Ventilation Day(s): 1  Skin Assessment: Clean, dry, & intact  Equipment Changed: HME  Vent Type: Servo i  Vent Mode: PRVC  Vt Ordered: 530 mL  Rate Set: (S) 24 bmp  FiO2 : (S) 30 %  Sensitivity: 5  PEEP/CPAP: 5  I Time/ I Time %: 0.9 s  Humidification Source: HME  Nitric Oxide/Epoprostenol In Use?: No  Additional Respiratory  Assessments  Pulse: 92  Resp: 28  SpO2: 99 %  End Tidal CO2: 26 (%)  Position: Semi-Main's  Humidification Source: HME  Oral Care Completed?: Yes  Oral Care: Mouth moisturizer, Mouth suctioned, Lip moisturizer applied, Suction toothette  Subglottic Suction Done?: Yes    ABGs:     Laboratory findings:    Complete Blood Count:   Recent Labs     12/09/19  0526 12/10/19  0830 12/11/19  0518   WBC 22.9* 5.7 13.3*   HGB 12.4 14.6 14.2   HCT 43.0 43.2 40.4    283 172        Last 3 Blood Glucose:   Recent Labs     12/10/19  1758 12/11/19  0001 12/11/19 0518   GLUCOSE 106* 92 102*        PT/INR:    Lab Results Component Value Date    PROTIME 13.3 12/10/2019    INR 1.3 12/10/2019     PTT:    Lab Results   Component Value Date    APTT 30.8 12/09/2019       Comprehensive Metabolic Profile:   Recent Labs     12/10/19  0600  12/10/19  1758 12/10/19  2016 12/11/19  0001 12/11/19  0518      < > 133*  --  130* 130*   K 3.7   < > 3.8 4.1 4.4 4.7      < > 95*  --  93* 93*   CO2 21   < > 20  --  20 20   BUN 36*   < > 45*  --  49* 51*   CREATININE 2.30*   < > 2.77*  --  2.94* 2.93*   GLUCOSE 128*   < > 106*  --  92 102*   CALCIUM 7.6*   < > 8.1*  --  8.5* 8.2*   PROT 4.9*  --   --   --   --   --    LABALBU 2.9*  --   --   --   --   --    BILITOT 0.49  --   --   --   --   --    ALKPHOS 69  --   --   --   --   --    AST 1,430*  --   --   --   --   --    ALT 1,099*  --   --   --   --   --     < > = values in this interval not displayed. Magnesium:   Lab Results   Component Value Date    MG 1.9 12/11/2019     Phosphorus:   Lab Results   Component Value Date    PHOS 4.5 12/11/2019     Ionized Calcium:   Lab Results   Component Value Date    CAION 0.99 12/11/2019        Urinalysis:     Troponin: No results for input(s): TROPONINI in the last 72 hours. Microbiology:    Cultures during this admission:     Blood cultures:                 [] None drawn      [] Negative             []  Positive (Details:  )  Urine Culture:                   [] None drawn      [] Negative             []  Positive (Details:  )  Sputum Culture:               [] None drawn       [] Negative             []  Positive (Details:  )   Endotracheal aspirate:     [] None drawn       [] Negative             []  Positive (Details:  )     Other pertinent Labs:       Radiology/Imaging:     Chest Xray (12/11/2019):  XR CHEST PORTABLE   Final Result   No acute cardiopulmonary process. Satisfactory position endotracheal tube. CT HEAD WO CONTRAST   Final Result   Diffuse cerebral edema consistent with anoxic brain injury. No acute   hemorrhage. XR CHEST PORTABLE   Final Result   Life-support devices as above. Bilateral subsegmental atelectasis. No lobar consolidation. ASSESSMENT:     Active Problems:    Cardiac arrest ([facility] [address]    Severe anoxic-ischemic encephalopathy (H[facility])  Resolved Problems:    * No resolved hospital problems. *    ·         PLAN:       1. Post cardiac arrest - asystole/V. fib. Was taken for emergent cardiac cath for EKG showing STEMI-like picture. Normal coronaries noted. Preserved LV function. 2. Cardiogenic shock - on Levophed. Start vasopressin if needed. Continue fluids. 3. New onset atrial fibrillation -cardiology on board. Appreciate anticoagulation recommendations. 4. Hypothermia protocol - Patient complete rewarming today around 7 PM. Patient unresponsive pupils related. CT head showed anoxic brain injury. 5. Anoxic brain injury -neurology consulted. Dilated and unresponsive pupils planning on brain death protocol. 6. Seizure-like activity -give Keppra. Neurology on board. EEG was done which showed burst suppression consistent with anoxic encephalopathy. 7. History of cocaine abuse   8. Metabolic acidosis - On Bicarb drip. BMP every 6 hours. 9. Lactic acidosis -bolus fluids. Trend lactic acid. 10. Shock liver - worsening. 11. DVT prophylaxis - Heparin sc. To switch to coagulation per cardiology recommendations for A. fib. Scott Grajeda M.D.              Critical care resident,  [address] [facility] ([place])             12/11/2019, 7:20 AM

## 2019-12-12 ENCOUNTER — APPOINTMENT (OUTPATIENT)
Dept: GENERAL RADIOLOGY | Age: 39
DRG: 812 | End: 2019-12-12
Payer: MEDICARE

## 2019-12-12 LAB
% CKMB: 1.8 % (ref 0–3)
ABSOLUTE EOS #: 0 K/UL (ref 0–0.4)
ABSOLUTE EOS #: 0 K/UL (ref 0–0.44)
ABSOLUTE IMMATURE GRANULOCYTE: 0 K/UL (ref 0–0.3)
ABSOLUTE IMMATURE GRANULOCYTE: 0.04 K/UL (ref 0–0.3)
ABSOLUTE LYMPH #: 0.09 K/UL (ref 1.1–3.7)
ABSOLUTE LYMPH #: 0.26 K/UL (ref 1–4.8)
ABSOLUTE MONO #: 0 K/UL (ref 0.1–0.8)
ABSOLUTE MONO #: 0.13 K/UL (ref 0.1–1.2)
ALBUMIN SERPL-MCNC: 2.5 G/DL (ref 3.5–5.2)
ALBUMIN SERPL-MCNC: 2.9 G/DL (ref 3.5–5.2)
ALBUMIN SERPL-MCNC: 3.1 G/DL (ref 3.5–5.2)
ALBUMIN SERPL-MCNC: 3.6 G/DL (ref 3.5–5.2)
ALBUMIN/GLOBULIN RATIO: 1.1 (ref 1–2.5)
ALBUMIN/GLOBULIN RATIO: 1.5 (ref 1–2.5)
ALBUMIN/GLOBULIN RATIO: 1.5 (ref 1–2.5)
ALBUMIN/GLOBULIN RATIO: 1.7 (ref 1–2.5)
ALLEN TEST: ABNORMAL
ALP BLD-CCNC: 102 U/L (ref 35–104)
ALP BLD-CCNC: 74 U/L (ref 35–104)
ALP BLD-CCNC: 82 U/L (ref 35–104)
ALP BLD-CCNC: 86 U/L (ref 35–104)
ALT SERPL-CCNC: 538 U/L (ref 5–33)
ALT SERPL-CCNC: 593 U/L (ref 5–33)
ALT SERPL-CCNC: 677 U/L (ref 5–33)
ALT SERPL-CCNC: 843 U/L (ref 5–33)
AMYLASE: 98 U/L (ref 28–100)
ANION GAP SERPL CALCULATED.3IONS-SCNC: 17 MMOL/L (ref 9–17)
ANION GAP SERPL CALCULATED.3IONS-SCNC: 20 MMOL/L (ref 9–17)
ANION GAP SERPL CALCULATED.3IONS-SCNC: 21 MMOL/L (ref 9–17)
ANION GAP SERPL CALCULATED.3IONS-SCNC: 22 MMOL/L (ref 9–17)
ANION GAP: 11 MMOL/L (ref 7–16)
AST SERPL-CCNC: 194 U/L
AST SERPL-CCNC: 271 U/L
AST SERPL-CCNC: 338 U/L
AST SERPL-CCNC: 466 U/L
BASOPHILS # BLD: 0 % (ref 0–2)
BASOPHILS # BLD: 1 % (ref 0–2)
BASOPHILS ABSOLUTE: 0 K/UL (ref 0–0.2)
BASOPHILS ABSOLUTE: 0.04 K/UL (ref 0–0.2)
BILIRUB SERPL-MCNC: 0.71 MG/DL (ref 0.3–1.2)
BILIRUB SERPL-MCNC: 0.82 MG/DL (ref 0.3–1.2)
BILIRUB SERPL-MCNC: 0.88 MG/DL (ref 0.3–1.2)
BILIRUB SERPL-MCNC: 1.04 MG/DL (ref 0.3–1.2)
BILIRUBIN DIRECT: 0.51 MG/DL
BILIRUBIN DIRECT: 0.54 MG/DL
BUN BLDV-MCNC: 57 MG/DL (ref 6–20)
BUN BLDV-MCNC: 57 MG/DL (ref 6–20)
BUN BLDV-MCNC: 58 MG/DL (ref 6–20)
BUN BLDV-MCNC: 58 MG/DL (ref 6–20)
BUN/CREAT BLD: ABNORMAL (ref 9–20)
CALCIUM IONIZED: 0.8 MMOL/L (ref 1.13–1.33)
CALCIUM IONIZED: 0.88 MMOL/L (ref 1.13–1.33)
CALCIUM IONIZED: 0.9 MMOL/L (ref 1.13–1.33)
CALCIUM IONIZED: 0.93 MMOL/L (ref 1.13–1.33)
CALCIUM SERPL-MCNC: 6.7 MG/DL (ref 8.6–10.4)
CALCIUM SERPL-MCNC: 7 MG/DL (ref 8.6–10.4)
CALCIUM SERPL-MCNC: 7 MG/DL (ref 8.6–10.4)
CALCIUM SERPL-MCNC: 7.4 MG/DL (ref 8.6–10.4)
CHLORIDE BLD-SCNC: 93 MMOL/L (ref 98–107)
CHLORIDE BLD-SCNC: 94 MMOL/L (ref 98–107)
CHLORIDE BLD-SCNC: 94 MMOL/L (ref 98–107)
CHLORIDE BLD-SCNC: 95 MMOL/L (ref 98–107)
CK MB: 22.2 NG/ML
CKMB INTERPRETATION: ABNORMAL
CO2: 20 MMOL/L (ref 20–31)
CO2: 21 MMOL/L (ref 20–31)
CO2: 22 MMOL/L (ref 20–31)
CO2: 25 MMOL/L (ref 20–31)
CREAT SERPL-MCNC: 2.85 MG/DL (ref 0.5–0.9)
CREAT SERPL-MCNC: 2.99 MG/DL (ref 0.5–0.9)
CREAT SERPL-MCNC: 3.01 MG/DL (ref 0.5–0.9)
CREAT SERPL-MCNC: 3.02 MG/DL (ref 0.5–0.9)
CULTURE: ABNORMAL
D-DIMER QUANTITATIVE: 9.6 MG/L FEU
DIFFERENTIAL TYPE: ABNORMAL
DIFFERENTIAL TYPE: ABNORMAL
EOSINOPHILS RELATIVE PERCENT: 0 % (ref 1–4)
EOSINOPHILS RELATIVE PERCENT: 0 % (ref 1–4)
FIBRINOGEN: 484 MG/DL (ref 140–420)
FIO2: 100
FIO2: 50
GFR AFRICAN AMERICAN: 21 ML/MIN
GFR AFRICAN AMERICAN: 22 ML/MIN
GFR NON-AFRICAN AMERICAN: 16 ML/MIN
GFR NON-AFRICAN AMERICAN: 17 ML/MIN
GFR NON-AFRICAN AMERICAN: 17 ML/MIN
GFR NON-AFRICAN AMERICAN: 18 ML/MIN
GFR NON-AFRICAN AMERICAN: 19 ML/MIN
GFR SERPL CREATININE-BSD FRML MDRD: 19 ML/MIN
GFR SERPL CREATININE-BSD FRML MDRD: ABNORMAL ML/MIN/{1.73_M2}
GLUCOSE BLD-MCNC: 110 MG/DL (ref 70–99)
GLUCOSE BLD-MCNC: 122 MG/DL (ref 74–100)
GLUCOSE BLD-MCNC: 124 MG/DL (ref 74–100)
GLUCOSE BLD-MCNC: 126 MG/DL (ref 70–99)
GLUCOSE BLD-MCNC: 136 MG/DL (ref 74–100)
GLUCOSE BLD-MCNC: 143 MG/DL (ref 70–99)
GLUCOSE BLD-MCNC: 166 MG/DL (ref 70–99)
HCG QUALITATIVE: NEGATIVE
HCT VFR BLD CALC: 28.1 % (ref 36.3–47.1)
HCT VFR BLD CALC: 30.2 % (ref 36.3–47.1)
HEMOGLOBIN: 10.5 G/DL (ref 11.9–15.1)
HEMOGLOBIN: 9.8 G/DL (ref 11.9–15.1)
IMMATURE GRANULOCYTES: 0 %
IMMATURE GRANULOCYTES: 1 %
INR BLD: 1.3
INR BLD: 1.4
LIPASE: <3 U/L (ref 13–60)
LV EF: 46 %
LV EF: 49 %
LVEF MODALITY: NORMAL
LVEF MODALITY: NORMAL
LYMPHOCYTES # BLD: 2 % (ref 24–43)
LYMPHOCYTES # BLD: 5 % (ref 24–44)
Lab: ABNORMAL
MAGNESIUM: 1.6 MG/DL (ref 1.6–2.6)
MAGNESIUM: 1.7 MG/DL (ref 1.6–2.6)
MAGNESIUM: 1.7 MG/DL (ref 1.6–2.6)
MAGNESIUM: 1.8 MG/DL (ref 1.6–2.6)
MCH RBC QN AUTO: 31.8 PG (ref 25.2–33.5)
MCH RBC QN AUTO: 31.9 PG (ref 25.2–33.5)
MCHC RBC AUTO-ENTMCNC: 34.8 G/DL (ref 28.4–34.8)
MCHC RBC AUTO-ENTMCNC: 34.9 G/DL (ref 28.4–34.8)
MCV RBC AUTO: 91.5 FL (ref 82.6–102.9)
MCV RBC AUTO: 91.5 FL (ref 82.6–102.9)
MODE: ABNORMAL
MONOCYTES # BLD: 0 % (ref 1–7)
MONOCYTES # BLD: 3 % (ref 3–12)
MORPHOLOGY: ABNORMAL
NEGATIVE BASE EXCESS, ART: ABNORMAL (ref 0–2)
NRBC AUTOMATED: 0 PER 100 WBC
NRBC AUTOMATED: 0 PER 100 WBC
O2 DEVICE/FLOW/%: ABNORMAL
PARTIAL THROMBOPLASTIN TIME: 39.4 SEC (ref 20.5–30.5)
PATIENT TEMP: 36
PATIENT TEMP: 36.7
PATIENT TEMP: 37.2
PATIENT TEMP: ABNORMAL
PDW BLD-RTO: 13.3 % (ref 11.8–14.4)
PDW BLD-RTO: 13.4 % (ref 11.8–14.4)
PHOSPHORUS: 6.4 MG/DL (ref 2.6–4.5)
PHOSPHORUS: 6.7 MG/DL (ref 2.6–4.5)
PHOSPHORUS: 6.9 MG/DL (ref 2.6–4.5)
PHOSPHORUS: 6.9 MG/DL (ref 2.6–4.5)
PLATELET # BLD: 57 K/UL (ref 138–453)
PLATELET # BLD: ABNORMAL K/UL (ref 138–453)
PLATELET ESTIMATE: ABNORMAL
PLATELET ESTIMATE: ABNORMAL
PLATELET, FLUORESCENCE: 47 K/UL (ref 138–453)
PLATELET, IMMATURE FRACTION: 7.9 % (ref 1.1–10.3)
PMV BLD AUTO: 11.8 FL (ref 8.1–13.5)
PMV BLD AUTO: ABNORMAL FL (ref 8.1–13.5)
POC CHLORIDE: 94 MMOL/L (ref 98–107)
POC CREATININE: 3.32 MG/DL (ref 0.51–1.19)
POC HCO3: 24.5 MMOL/L (ref 21–28)
POC HCO3: 25.1 MMOL/L (ref 21–28)
POC HCO3: 25.3 MMOL/L (ref 21–28)
POC HCO3: 25.9 MMOL/L (ref 21–28)
POC HCO3: 27.8 MMOL/L (ref 21–28)
POC HCO3: 28.3 MMOL/L (ref 21–28)
POC HCO3: 30.6 MMOL/L (ref 21–28)
POC HEMATOCRIT: 31 % (ref 36–46)
POC HEMOGLOBIN: 10.7 G/DL (ref 12–16)
POC IONIZED CALCIUM: 0.84 MMOL/L (ref 1.15–1.33)
POC LACTIC ACID: 5.12 MMOL/L (ref 0.56–1.39)
POC LACTIC ACID: 5.75 MMOL/L (ref 0.56–1.39)
POC O2 SATURATION: 100 % (ref 94–98)
POC PCO2 TEMP: ABNORMAL MM HG
POC PCO2: 37.5 MM HG (ref 35–48)
POC PCO2: 41.1 MM HG (ref 35–48)
POC PCO2: 41.8 MM HG (ref 35–48)
POC PCO2: 41.9 MM HG (ref 35–48)
POC PCO2: 46 MM HG (ref 35–48)
POC PCO2: 47.6 MM HG (ref 35–48)
POC PCO2: 55.5 MM HG (ref 35–48)
POC PH TEMP: ABNORMAL
POC PH: 7.35 (ref 7.35–7.45)
POC PH: 7.38 (ref 7.35–7.45)
POC PH: 7.38 (ref 7.35–7.45)
POC PH: 7.4 (ref 7.35–7.45)
POC PH: 7.42 (ref 7.35–7.45)
POC PO2 TEMP: ABNORMAL MM HG
POC PO2: 169.7 MM HG (ref 83–108)
POC PO2: 171.8 MM HG (ref 83–108)
POC PO2: 456 MM HG (ref 83–108)
POC PO2: 478.5 MM HG (ref 83–108)
POC PO2: 488 MM HG (ref 83–108)
POC PO2: 538.4 MM HG (ref 83–108)
POC PO2: 572.3 MM HG (ref 83–108)
POC POTASSIUM: 3.8 MMOL/L (ref 3.5–4.5)
POC SODIUM: 130 MMOL/L (ref 138–146)
POSITIVE BASE EXCESS, ART: 0 (ref 0–3)
POSITIVE BASE EXCESS, ART: 1 (ref 0–3)
POSITIVE BASE EXCESS, ART: 2 (ref 0–3)
POSITIVE BASE EXCESS, ART: 3 (ref 0–3)
POSITIVE BASE EXCESS, ART: 4 (ref 0–3)
POTASSIUM SERPL-SCNC: 3.8 MMOL/L (ref 3.7–5.3)
POTASSIUM SERPL-SCNC: 4 MMOL/L (ref 3.7–5.3)
POTASSIUM SERPL-SCNC: 4.2 MMOL/L (ref 3.7–5.3)
POTASSIUM SERPL-SCNC: 4.5 MMOL/L (ref 3.7–5.3)
PROTHROMBIN TIME: 13.8 SEC (ref 9–12)
PROTHROMBIN TIME: 14.1 SEC (ref 9–12)
PROTHROMBIN TIME: 14.2 SEC (ref 9–12)
PROTHROMBIN TIME: 14.4 SEC (ref 9–12)
RBC # BLD: 3.07 M/UL (ref 3.95–5.11)
RBC # BLD: 3.3 M/UL (ref 3.95–5.11)
RBC # BLD: ABNORMAL 10*6/UL
RBC # BLD: ABNORMAL 10*6/UL
SAMPLE SITE: ABNORMAL
SEG NEUTROPHILS: 93 % (ref 36–65)
SEG NEUTROPHILS: 95 % (ref 36–66)
SEGMENTED NEUTROPHILS ABSOLUTE COUNT: 4 K/UL (ref 1.5–8.1)
SEGMENTED NEUTROPHILS ABSOLUTE COUNT: 4.94 K/UL (ref 1.8–7.7)
SODIUM BLD-SCNC: 135 MMOL/L (ref 135–144)
SODIUM BLD-SCNC: 136 MMOL/L (ref 135–144)
SODIUM BLD-SCNC: 136 MMOL/L (ref 135–144)
SODIUM BLD-SCNC: 137 MMOL/L (ref 135–144)
SPECIMEN DESCRIPTION: ABNORMAL
TCO2 (CALC), ART: 26 MMOL/L (ref 22–29)
TCO2 (CALC), ART: 26 MMOL/L (ref 22–29)
TCO2 (CALC), ART: 27 MMOL/L (ref 22–29)
TCO2 (CALC), ART: 27 MMOL/L (ref 22–29)
TCO2 (CALC), ART: 29 MMOL/L (ref 22–29)
TCO2 (CALC), ART: 30 MMOL/L (ref 22–29)
TCO2 (CALC), ART: 32 MMOL/L (ref 22–29)
TOTAL CK: 1246 U/L (ref 26–192)
TOTAL PROTEIN: 4.7 G/DL (ref 6.4–8.3)
TOTAL PROTEIN: 4.9 G/DL (ref 6.4–8.3)
TOTAL PROTEIN: 5.2 G/DL (ref 6.4–8.3)
TOTAL PROTEIN: 5.7 G/DL (ref 6.4–8.3)
TROPONIN INTERP: ABNORMAL
TROPONIN T: ABNORMAL NG/ML
TROPONIN, HIGH SENSITIVITY: 421 NG/L (ref 0–14)
WBC # BLD: 4.3 K/UL (ref 3.5–11.3)
WBC # BLD: 5.2 K/UL (ref 3.5–11.3)
WBC # BLD: ABNORMAL 10*3/UL
WBC # BLD: ABNORMAL 10*3/UL

## 2019-12-12 PROCEDURE — 6360000002 HC RX W HCPCS: Performed by: SURGERY

## 2019-12-12 PROCEDURE — 83605 ASSAY OF LACTIC ACID: CPT

## 2019-12-12 PROCEDURE — 85014 HEMATOCRIT: CPT

## 2019-12-12 PROCEDURE — 2500000003 HC RX 250 WO HCPCS: Performed by: SURGERY

## 2019-12-12 PROCEDURE — 94669 MECHANICAL CHEST WALL OSCILL: CPT

## 2019-12-12 PROCEDURE — 82435 ASSAY OF BLOOD CHLORIDE: CPT

## 2019-12-12 PROCEDURE — 83735 ASSAY OF MAGNESIUM: CPT

## 2019-12-12 PROCEDURE — 85610 PROTHROMBIN TIME: CPT

## 2019-12-12 PROCEDURE — 2700000000 HC OXYGEN THERAPY PER DAY

## 2019-12-12 PROCEDURE — 82803 BLOOD GASES ANY COMBINATION: CPT

## 2019-12-12 PROCEDURE — 84132 ASSAY OF SERUM POTASSIUM: CPT

## 2019-12-12 PROCEDURE — 82565 ASSAY OF CREATININE: CPT

## 2019-12-12 PROCEDURE — 71045 X-RAY EXAM CHEST 1 VIEW: CPT

## 2019-12-12 PROCEDURE — 80053 COMPREHEN METABOLIC PANEL: CPT

## 2019-12-12 PROCEDURE — 84100 ASSAY OF PHOSPHORUS: CPT

## 2019-12-12 PROCEDURE — 82248 BILIRUBIN DIRECT: CPT

## 2019-12-12 PROCEDURE — 94761 N-INVAS EAR/PLS OXIMETRY MLT: CPT

## 2019-12-12 PROCEDURE — 82553 CREATINE MB FRACTION: CPT

## 2019-12-12 PROCEDURE — 94003 VENT MGMT INPAT SUBQ DAY: CPT

## 2019-12-12 PROCEDURE — P9047 ALBUMIN (HUMAN), 25%, 50ML: HCPCS | Performed by: SURGERY

## 2019-12-12 PROCEDURE — 84703 CHORIONIC GONADOTROPIN ASSAY: CPT

## 2019-12-12 PROCEDURE — 85025 COMPLETE CBC W/AUTO DIFF WBC: CPT

## 2019-12-12 PROCEDURE — 83690 ASSAY OF LIPASE: CPT

## 2019-12-12 PROCEDURE — 2580000003 HC RX 258: Performed by: SURGERY

## 2019-12-12 PROCEDURE — 82550 ASSAY OF CK (CPK): CPT

## 2019-12-12 PROCEDURE — 31624 DX BRONCHOSCOPE/LAVAGE: CPT | Performed by: INTERNAL MEDICINE

## 2019-12-12 PROCEDURE — 82947 ASSAY GLUCOSE BLOOD QUANT: CPT

## 2019-12-12 PROCEDURE — 87205 SMEAR GRAM STAIN: CPT

## 2019-12-12 PROCEDURE — 3609010800 HC BRONCHOSCOPY ALVEOLAR LAVAGE: Performed by: INTERNAL MEDICINE

## 2019-12-12 PROCEDURE — P9041 ALBUMIN (HUMAN),5%, 50ML: HCPCS | Performed by: SURGERY

## 2019-12-12 PROCEDURE — 85055 RETICULATED PLATELET ASSAY: CPT

## 2019-12-12 PROCEDURE — 94664 DEMO&/EVAL PT USE INHALER: CPT

## 2019-12-12 PROCEDURE — 2000000000 HC ICU R&B

## 2019-12-12 PROCEDURE — 85730 THROMBOPLASTIN TIME PARTIAL: CPT

## 2019-12-12 PROCEDURE — 94640 AIRWAY INHALATION TREATMENT: CPT

## 2019-12-12 PROCEDURE — 37799 UNLISTED PX VASCULAR SURGERY: CPT

## 2019-12-12 PROCEDURE — 82330 ASSAY OF CALCIUM: CPT

## 2019-12-12 PROCEDURE — 2580000003 HC RX 258: Performed by: STUDENT IN AN ORGANIZED HEALTH CARE EDUCATION/TRAINING PROGRAM

## 2019-12-12 PROCEDURE — 87070 CULTURE OTHR SPECIMN AEROBIC: CPT

## 2019-12-12 PROCEDURE — 82150 ASSAY OF AMYLASE: CPT

## 2019-12-12 PROCEDURE — 84484 ASSAY OF TROPONIN QUANT: CPT

## 2019-12-12 PROCEDURE — 84295 ASSAY OF SERUM SODIUM: CPT

## 2019-12-12 RX ORDER — ALBUMIN, HUMAN INJ 5% 5 %
25 SOLUTION INTRAVENOUS ONCE
Status: COMPLETED | OUTPATIENT
Start: 2019-12-12 | End: 2019-12-12

## 2019-12-12 RX ORDER — FUROSEMIDE 10 MG/ML
20 INJECTION INTRAMUSCULAR; INTRAVENOUS ONCE
Status: COMPLETED | OUTPATIENT
Start: 2019-12-12 | End: 2019-12-12

## 2019-12-12 RX ORDER — ALBUMIN (HUMAN) 12.5 G/50ML
25 SOLUTION INTRAVENOUS ONCE
Status: COMPLETED | OUTPATIENT
Start: 2019-12-12 | End: 2019-12-12

## 2019-12-12 RX ORDER — FUROSEMIDE 10 MG/ML
10 INJECTION INTRAMUSCULAR; INTRAVENOUS ONCE
Status: COMPLETED | OUTPATIENT
Start: 2019-12-12 | End: 2019-12-12

## 2019-12-12 RX ORDER — FUROSEMIDE 10 MG/ML
40 INJECTION INTRAMUSCULAR; INTRAVENOUS ONCE
Status: COMPLETED | OUTPATIENT
Start: 2019-12-12 | End: 2019-12-12

## 2019-12-12 RX ORDER — DOBUTAMINE HYDROCHLORIDE 400 MG/100ML
2.5 INJECTION INTRAVENOUS CONTINUOUS
Status: DISCONTINUED | OUTPATIENT
Start: 2019-12-12 | End: 2019-12-13 | Stop reason: HOSPADM

## 2019-12-12 RX ORDER — FUROSEMIDE 10 MG/ML
100 INJECTION INTRAMUSCULAR; INTRAVENOUS ONCE
Status: COMPLETED | OUTPATIENT
Start: 2019-12-12 | End: 2019-12-12

## 2019-12-12 RX ADMIN — LEVOFLOXACIN 750 MG: 5 INJECTION, SOLUTION INTRAVENOUS at 19:01

## 2019-12-12 RX ADMIN — LEVOFLOXACIN 750 MG: 5 INJECTION, SOLUTION INTRAVENOUS at 02:27

## 2019-12-12 RX ADMIN — ALBUMIN (HUMAN) 25 G: 0.25 INJECTION, SOLUTION INTRAVENOUS at 12:04

## 2019-12-12 RX ADMIN — Medication 10 ML: at 22:05

## 2019-12-12 RX ADMIN — FUROSEMIDE 10 MG: 10 INJECTION, SOLUTION INTRAMUSCULAR; INTRAVENOUS at 03:17

## 2019-12-12 RX ADMIN — MAGNESIUM SULFATE HEPTAHYDRATE 3 G: 1 INJECTION, SOLUTION INTRAVENOUS at 19:45

## 2019-12-12 RX ADMIN — CALCIUM GLUCONATE 1 G: 98 INJECTION, SOLUTION INTRAVENOUS at 01:32

## 2019-12-12 RX ADMIN — ALBUMIN (HUMAN) 25 G: 0.25 INJECTION, SOLUTION INTRAVENOUS at 17:38

## 2019-12-12 RX ADMIN — LEVOTHYROXINE SODIUM ANHYDROUS 30 MCG/HR: 100 INJECTION, POWDER, LYOPHILIZED, FOR SOLUTION INTRAVENOUS at 14:47

## 2019-12-12 RX ADMIN — LEVOTHYROXINE SODIUM ANHYDROUS 20 MCG: 200 INJECTION, POWDER, LYOPHILIZED, FOR SOLUTION INTRAVENOUS at 00:15

## 2019-12-12 RX ADMIN — CALCIUM GLUCONATE 2 G: 98 INJECTION, SOLUTION INTRAVENOUS at 14:58

## 2019-12-12 RX ADMIN — FUROSEMIDE 40 MG: 10 INJECTION, SOLUTION INTRAMUSCULAR; INTRAVENOUS at 07:34

## 2019-12-12 RX ADMIN — ALBUTEROL SULFATE 2.5 MG: 2.5 SOLUTION RESPIRATORY (INHALATION) at 15:38

## 2019-12-12 RX ADMIN — CALCIUM GLUCONATE 1 G: 98 INJECTION, SOLUTION INTRAVENOUS at 08:14

## 2019-12-12 RX ADMIN — FUROSEMIDE 20 MG: 10 INJECTION, SOLUTION INTRAMUSCULAR; INTRAVENOUS at 06:43

## 2019-12-12 RX ADMIN — SODIUM CHLORIDE 1350 MG: 0.9 INJECTION, SOLUTION INTRAVENOUS at 00:11

## 2019-12-12 RX ADMIN — Medication 12 MCG/MIN: at 00:01

## 2019-12-12 RX ADMIN — LEVOTHYROXINE SODIUM ANHYDROUS 30 MCG/HR: 100 INJECTION, POWDER, LYOPHILIZED, FOR SOLUTION INTRAVENOUS at 22:05

## 2019-12-12 RX ADMIN — ALBUTEROL SULFATE 2.5 MG: 2.5 SOLUTION RESPIRATORY (INHALATION) at 23:56

## 2019-12-12 RX ADMIN — ALBUMIN (HUMAN) 25 G: 0.25 INJECTION, SOLUTION INTRAVENOUS at 07:30

## 2019-12-12 RX ADMIN — FUROSEMIDE 40 MG: 10 INJECTION, SOLUTION INTRAMUSCULAR; INTRAVENOUS at 12:04

## 2019-12-12 RX ADMIN — FUROSEMIDE 100 MG: 10 INJECTION, SOLUTION INTRAMUSCULAR; INTRAVENOUS at 17:38

## 2019-12-12 RX ADMIN — ALBUTEROL SULFATE 2.5 MG: 2.5 SOLUTION RESPIRATORY (INHALATION) at 07:47

## 2019-12-12 RX ADMIN — ALBUTEROL SULFATE 2.5 MG: 2.5 SOLUTION RESPIRATORY (INHALATION) at 03:27

## 2019-12-12 RX ADMIN — CALCIUM GLUCONATE 2 G: 98 INJECTION, SOLUTION INTRAVENOUS at 12:59

## 2019-12-12 RX ADMIN — LEVOFLOXACIN 750 MG: 5 INJECTION, SOLUTION INTRAVENOUS at 14:48

## 2019-12-12 RX ADMIN — LEVOTHYROXINE SODIUM ANHYDROUS 10 MCG/HR: 100 INJECTION, POWDER, LYOPHILIZED, FOR SOLUTION INTRAVENOUS at 00:14

## 2019-12-12 RX ADMIN — CALCIUM GLUCONATE 1 G: 98 INJECTION, SOLUTION INTRAVENOUS at 08:52

## 2019-12-12 RX ADMIN — ALBUTEROL SULFATE 2.5 MG: 2.5 SOLUTION RESPIRATORY (INHALATION) at 19:24

## 2019-12-12 RX ADMIN — ALBUTEROL SULFATE 2.5 MG: 2.5 SOLUTION RESPIRATORY (INHALATION) at 11:34

## 2019-12-12 RX ADMIN — ALBUMIN (HUMAN) 25 G: 12.5 INJECTION, SOLUTION INTRAVENOUS at 00:48

## 2019-12-12 RX ADMIN — CALCIUM GLUCONATE 1 G: 98 INJECTION, SOLUTION INTRAVENOUS at 19:58

## 2019-12-12 RX ADMIN — LEVOFLOXACIN 750 MG: 5 INJECTION, SOLUTION INTRAVENOUS at 07:51

## 2019-12-12 ASSESSMENT — PULMONARY FUNCTION TESTS
PIF_VALUE: 18
PIF_VALUE: 17
PIF_VALUE: 20
PIF_VALUE: 17
PIF_VALUE: 20
PIF_VALUE: 20
PIF_VALUE: 17
PIF_VALUE: 19

## 2019-12-12 NOTE — PROCEDURES
Procedure Note:     Description of procedure      [X] Fiberoptic Bronchoscopy   [X]  Bronchoalveolar lavage   []  Bronchial washing   []  Bronchial brushing   []  Transbronchial biopsy   []  Endobronchial biopsy   []  Transbronchial needle aspiration   []  Endobronchial ultrasound-guided   []  Pretracheal node   []  Subcarinal node   []  Right hilar node   []  Left hilar node   []  AP window node   []  Mass   []  Interventional procedure  []  With fluoroscopy   []  Balloon dilatation   []  Stent placement   []  Argon plasma coagulation   []  Nd:YAG laser  []  Inspection only   []  With fluoroscopic guidance. []  With ultrasound guidance. Indication for Bronchoscopy   []  Nodule(s). [] Atelectasis  [] Hemoptysis  [] Pneumonia/Pulmonary infiltrate  [] Respiratory failure/Hypoxemia  [] Airway Stenosis/Obstruction   []  Endotracheal mass/obstruction       [] Endobronchial mass/obstruction  [] Adenopathy  [] Immunocompromised host   [] Lung cancer  [] Metastatic disease  [X] Lung transplant donor  [] Bronchiectasis  [] Therapeutic, to clear airway secretions    Consent    [X]        Life connection  [] Performed emergently  [] Details of the procedure were explained in detail which included risks and benefits. An opportunity was provided to ask questions. Consent was obtained from the following person(s):   [] Patient   [] Sibling   [] Spouse              [] Parent   [] Child   [] Guardian    Documentation Review     [X] Patient admission history and physical examination as well as interval hospital course documentation reviewed prior to initiating procedure and/or procedural sedation    Time out     [X] The process of patient care was interrupted for all participants to confirm the correct patient, correct procedure, correct site and the availability of appropriate equipment. Please see nursing documentation for those present.     Quick Look     [X] Prior to initiating procedural sedation a \"quick suggestive of possible aspiration pneumonia.       Gal aTnner  Pulmonary and Critical Care Medicine  12/12/19  12:57 PM

## 2019-12-12 NOTE — PROGRESS NOTES
4943 Peep challenge  0910 Peep challenge  1014 Peep challenge  1129 ABG  On 100%, PEEP 5  1255 Peep challenge  1443 Peep challenge  1526 ABG  100%, PEEP 5  1550 PEEP challenge  1751 PEEP challenge

## 2019-12-12 NOTE — PROGRESS NOTES
Pt's settings has changed to the criteria of life connections, pt is receiving albuterol tx's Q4, Metaneb Q4 and peep challenge Q1hour per life connections protocol

## 2019-12-13 ENCOUNTER — APPOINTMENT (OUTPATIENT)
Dept: CT IMAGING | Age: 39
DRG: 812 | End: 2019-12-13
Payer: MEDICARE

## 2019-12-13 ENCOUNTER — ANESTHESIA (OUTPATIENT)
Dept: OPERATING ROOM | Age: 39
DRG: 812 | End: 2019-12-13
Payer: MEDICARE

## 2019-12-13 ENCOUNTER — ANESTHESIA EVENT (OUTPATIENT)
Dept: OPERATING ROOM | Age: 39
DRG: 812 | End: 2019-12-13
Payer: MEDICARE

## 2019-12-13 ENCOUNTER — APPOINTMENT (OUTPATIENT)
Dept: GENERAL RADIOLOGY | Age: 39
DRG: 812 | End: 2019-12-13
Payer: MEDICARE

## 2019-12-13 VITALS — DIASTOLIC BLOOD PRESSURE: 105 MMHG | TEMPERATURE: 83.4 F | SYSTOLIC BLOOD PRESSURE: 120 MMHG | OXYGEN SATURATION: 100 %

## 2019-12-13 VITALS
WEIGHT: 198.19 LBS | DIASTOLIC BLOOD PRESSURE: 57 MMHG | TEMPERATURE: 96.1 F | SYSTOLIC BLOOD PRESSURE: 96 MMHG | OXYGEN SATURATION: 95 % | HEART RATE: 86 BPM

## 2019-12-13 LAB
-: NORMAL
ABSOLUTE EOS #: 0 K/UL (ref 0–0.4)
ABSOLUTE IMMATURE GRANULOCYTE: 0 K/UL (ref 0–0.3)
ABSOLUTE LYMPH #: 0.17 K/UL (ref 1–4.8)
ABSOLUTE MONO #: 0.06 K/UL (ref 0.1–0.8)
ALBUMIN SERPL-MCNC: 3.5 G/DL (ref 3.5–5.2)
ALBUMIN SERPL-MCNC: 3.8 G/DL (ref 3.5–5.2)
ALBUMIN/GLOBULIN RATIO: 1.8 (ref 1–2.5)
ALBUMIN/GLOBULIN RATIO: 1.9 (ref 1–2.5)
ALLEN TEST: ABNORMAL
ALP BLD-CCNC: 65 U/L (ref 35–104)
ALP BLD-CCNC: 69 U/L (ref 35–104)
ALT SERPL-CCNC: 422 U/L (ref 5–33)
ALT SERPL-CCNC: 459 U/L (ref 5–33)
AMORPHOUS: NORMAL
ANION GAP SERPL CALCULATED.3IONS-SCNC: 14 MMOL/L (ref 9–17)
ANION GAP SERPL CALCULATED.3IONS-SCNC: 16 MMOL/L (ref 9–17)
ANION GAP SERPL CALCULATED.3IONS-SCNC: 17 MMOL/L (ref 9–17)
AST SERPL-CCNC: 112 U/L
AST SERPL-CCNC: 142 U/L
BACTERIA: NORMAL
BASOPHILS # BLD: 0 % (ref 0–2)
BASOPHILS ABSOLUTE: 0 K/UL (ref 0–0.2)
BILIRUB SERPL-MCNC: 0.63 MG/DL (ref 0.3–1.2)
BILIRUB SERPL-MCNC: 0.65 MG/DL (ref 0.3–1.2)
BILIRUBIN URINE: NEGATIVE
BUN BLDV-MCNC: 54 MG/DL (ref 6–20)
BUN BLDV-MCNC: 58 MG/DL (ref 6–20)
BUN BLDV-MCNC: 61 MG/DL (ref 6–20)
BUN/CREAT BLD: ABNORMAL (ref 9–20)
CALCIUM IONIZED: 0.93 MMOL/L (ref 1.13–1.33)
CALCIUM IONIZED: 0.95 MMOL/L (ref 1.13–1.33)
CALCIUM SERPL-MCNC: 7.1 MG/DL (ref 8.6–10.4)
CALCIUM SERPL-MCNC: 7.2 MG/DL (ref 8.6–10.4)
CALCIUM SERPL-MCNC: 7.7 MG/DL (ref 8.6–10.4)
CARBOXYHEMOGLOBIN: 0 % (ref 0–5)
CASTS UA: NORMAL /LPF (ref 0–8)
CHLORIDE BLD-SCNC: 102 MMOL/L (ref 98–107)
CHLORIDE BLD-SCNC: 94 MMOL/L (ref 98–107)
CHLORIDE BLD-SCNC: 97 MMOL/L (ref 98–107)
CO2: 23 MMOL/L (ref 20–31)
CO2: 26 MMOL/L (ref 20–31)
CO2: 27 MMOL/L (ref 20–31)
COLOR: YELLOW
COMMENT UA: ABNORMAL
CREAT SERPL-MCNC: 2.76 MG/DL (ref 0.5–0.9)
CREAT SERPL-MCNC: 3.02 MG/DL (ref 0.5–0.9)
CREAT SERPL-MCNC: 3.08 MG/DL (ref 0.5–0.9)
CRYSTALS, UA: NORMAL /HPF
CULTURE: ABNORMAL
CULTURE: ABNORMAL
DIFFERENTIAL TYPE: ABNORMAL
DIRECT EXAM: ABNORMAL
EKG ATRIAL RATE: 105 BPM
EKG Q-T INTERVAL: 446 MS
EKG QRS DURATION: 132 MS
EKG QTC CALCULATION (BAZETT): 530 MS
EKG R AXIS: 28 DEGREES
EKG T AXIS: -62 DEGREES
EKG VENTRICULAR RATE: 85 BPM
EOSINOPHILS RELATIVE PERCENT: 0 % (ref 1–4)
EPITHELIAL CELLS UA: NORMAL /HPF (ref 0–5)
FIO2: 100
FIO2: 50
FIO2: 96
FIO2: 97
GFR AFRICAN AMERICAN: 21 ML/MIN
GFR AFRICAN AMERICAN: 21 ML/MIN
GFR AFRICAN AMERICAN: 23 ML/MIN
GFR NON-AFRICAN AMERICAN: 17 ML/MIN
GFR NON-AFRICAN AMERICAN: 17 ML/MIN
GFR NON-AFRICAN AMERICAN: 19 ML/MIN
GFR SERPL CREATININE-BSD FRML MDRD: ABNORMAL ML/MIN/{1.73_M2}
GLUCOSE BLD-MCNC: 114 MG/DL (ref 74–100)
GLUCOSE BLD-MCNC: 118 MG/DL (ref 70–99)
GLUCOSE BLD-MCNC: 126 MG/DL (ref 70–99)
GLUCOSE BLD-MCNC: 176 MG/DL (ref 70–99)
GLUCOSE URINE: NEGATIVE
HCO3 ARTERIAL: 23.9 MMOL/L (ref 22–27)
HCO3 ARTERIAL: 24.3 MMOL/L (ref 22–27)
HCO3 ARTERIAL: 25.4 MMOL/L (ref 22–27)
HCO3 ARTERIAL: 25.5 MMOL/L (ref 22–27)
HCO3 ARTERIAL: 25.6 MMOL/L (ref 22–27)
HCO3 ARTERIAL: 25.9 MMOL/L (ref 22–27)
HCO3 ARTERIAL: 26.1 MMOL/L (ref 22–27)
HCT VFR BLD CALC: 26.3 % (ref 36.3–47.1)
HEMOGLOBIN: 9 G/DL (ref 11.9–15.1)
IMMATURE GRANULOCYTES: 0 %
INR BLD: 1.2
KETONES, URINE: NEGATIVE
LEUKOCYTE ESTERASE, URINE: NEGATIVE
LYMPHOCYTES # BLD: 3 % (ref 24–44)
Lab: ABNORMAL
Lab: ABNORMAL
MAGNESIUM: 2.2 MG/DL (ref 1.6–2.6)
MAGNESIUM: 2.3 MG/DL (ref 1.6–2.6)
MCH RBC QN AUTO: 31.6 PG (ref 25.2–33.5)
MCHC RBC AUTO-ENTMCNC: 34.2 G/DL (ref 28.4–34.8)
MCV RBC AUTO: 92.3 FL (ref 82.6–102.9)
METHEMOGLOBIN: ABNORMAL % (ref 0–1.5)
MODE: ABNORMAL
MONOCYTES # BLD: 1 % (ref 1–7)
MORPHOLOGY: NORMAL
MUCUS: NORMAL
NEGATIVE BASE EXCESS, ART: ABNORMAL (ref 0–2)
NEGATIVE BASE EXCESS, ART: ABNORMAL (ref 0–2)
NEGATIVE BASE EXCESS, ART: ABNORMAL MMOL/L (ref 0–2)
NITRITE, URINE: NEGATIVE
NOTIFICATION TIME: ABNORMAL
NOTIFICATION: ABNORMAL
NRBC AUTOMATED: 0 PER 100 WBC
O2 DEVICE/FLOW/%: ABNORMAL
O2 SAT, ARTERIAL: 96.6 % (ref 94–100)
O2 SAT, ARTERIAL: 97.9 % (ref 94–100)
O2 SAT, ARTERIAL: 98.2 % (ref 94–100)
O2 SAT, ARTERIAL: 98.5 % (ref 94–100)
O2 SAT, ARTERIAL: 98.6 % (ref 94–100)
O2 SAT, ARTERIAL: 98.6 % (ref 94–100)
O2 SAT, ARTERIAL: 98.7 % (ref 94–100)
OTHER OBSERVATIONS UA: NORMAL
OXYHEMOGLOBIN: ABNORMAL % (ref 95–98)
PATIENT TEMP: 34.5
PATIENT TEMP: 34.5
PATIENT TEMP: 34.8
PATIENT TEMP: 35
PATIENT TEMP: 37
PATIENT TEMP: ABNORMAL
PCO2 ARTERIAL: 21.8 MMHG (ref 32–45)
PCO2 ARTERIAL: 23.8 MMHG (ref 32–45)
PCO2 ARTERIAL: 25 MMHG (ref 32–45)
PCO2 ARTERIAL: 26.4 MMHG (ref 32–45)
PCO2 ARTERIAL: 29.6 MMHG (ref 32–45)
PCO2 ARTERIAL: 30.4 MMHG (ref 32–45)
PCO2 ARTERIAL: 34.1 MMHG (ref 32–45)
PCO2, ART, TEMP ADJ: 19.6 (ref 32–45)
PCO2, ART, TEMP ADJ: 21.1 (ref 32–45)
PCO2, ART, TEMP ADJ: 22.5 (ref 32–45)
PCO2, ART, TEMP ADJ: 24 (ref 32–45)
PCO2, ART, TEMP ADJ: 26.6 (ref 32–45)
PCO2, ART, TEMP ADJ: 27.3 (ref 32–45)
PCO2, ART, TEMP ADJ: 30.2 (ref 32–45)
PDW BLD-RTO: 13.3 % (ref 11.8–14.4)
PEEP/CPAP: ABNORMAL
PH ARTERIAL: 7.5 (ref 7.35–7.45)
PH ARTERIAL: 7.54 (ref 7.35–7.45)
PH ARTERIAL: 7.55 (ref 7.35–7.45)
PH ARTERIAL: 7.59 (ref 7.35–7.45)
PH ARTERIAL: 7.61 (ref 7.35–7.45)
PH ARTERIAL: 7.61 (ref 7.35–7.45)
PH ARTERIAL: 7.65 (ref 7.35–7.45)
PH UA: 5 (ref 5–8)
PH, ART, TEMP ADJ: 7.54 (ref 7.35–7.45)
PH, ART, TEMP ADJ: 7.57 (ref 7.35–7.45)
PH, ART, TEMP ADJ: 7.58 (ref 7.35–7.45)
PH, ART, TEMP ADJ: 7.62 (ref 7.35–7.45)
PH, ART, TEMP ADJ: 7.65 (ref 7.35–7.45)
PH, ART, TEMP ADJ: 7.65 (ref 7.35–7.45)
PH, ART, TEMP ADJ: 7.68 (ref 7.35–7.45)
PHOSPHORUS: 5.5 MG/DL (ref 2.6–4.5)
PHOSPHORUS: 5.7 MG/DL (ref 2.6–4.5)
PLATELET # BLD: ABNORMAL K/UL (ref 138–453)
PLATELET ESTIMATE: ABNORMAL
PLATELET, FLUORESCENCE: 38 K/UL (ref 138–453)
PLATELET, IMMATURE FRACTION: 8 % (ref 1.1–10.3)
PMV BLD AUTO: ABNORMAL FL (ref 8.1–13.5)
PO2 ARTERIAL: 370 MMHG (ref 75–95)
PO2 ARTERIAL: 409 MMHG (ref 75–95)
PO2 ARTERIAL: 422 MMHG (ref 75–95)
PO2 ARTERIAL: 432 MMHG (ref 75–95)
PO2 ARTERIAL: 436 MMHG (ref 75–95)
PO2 ARTERIAL: 493 MMHG (ref 75–95)
PO2 ARTERIAL: 93.6 MMHG (ref 75–95)
PO2, ART, TEMP ADJ: 359 MMHG (ref 75–95)
PO2, ART, TEMP ADJ: 399 MMHG (ref 75–95)
PO2, ART, TEMP ADJ: 409 MMHG (ref 75–95)
PO2, ART, TEMP ADJ: 420 MMHG (ref 75–95)
PO2, ART, TEMP ADJ: 424 MMHG (ref 75–95)
PO2, ART, TEMP ADJ: 480 MMHG (ref 75–95)
PO2, ART, TEMP ADJ: 83.4 MMHG (ref 75–95)
POC HCO3: 28.8 MMOL/L (ref 21–28)
POC HCO3: 29.1 MMOL/L (ref 21–28)
POC O2 SATURATION: 100 % (ref 94–98)
POC O2 SATURATION: 99 % (ref 94–98)
POC PCO2 TEMP: ABNORMAL MM HG
POC PCO2 TEMP: ABNORMAL MM HG
POC PCO2: 39.1 MM HG (ref 35–48)
POC PCO2: 41.2 MM HG (ref 35–48)
POC PH TEMP: ABNORMAL
POC PH TEMP: ABNORMAL
POC PH: 7.45 (ref 7.35–7.45)
POC PH: 7.48 (ref 7.35–7.45)
POC PO2 TEMP: ABNORMAL MM HG
POC PO2 TEMP: ABNORMAL MM HG
POC PO2: 147.8 MM HG (ref 83–108)
POC PO2: 511.5 MM HG (ref 83–108)
POSITIVE BASE EXCESS, ART: 2.8 MMOL/L (ref 0–2)
POSITIVE BASE EXCESS, ART: 3.2 MMOL/L (ref 0–2)
POSITIVE BASE EXCESS, ART: 3.4 MMOL/L (ref 0–2)
POSITIVE BASE EXCESS, ART: 3.7 MMOL/L (ref 0–2)
POSITIVE BASE EXCESS, ART: 3.9 MMOL/L (ref 0–2)
POSITIVE BASE EXCESS, ART: 4 (ref 0–3)
POSITIVE BASE EXCESS, ART: 4 MMOL/L (ref 0–2)
POSITIVE BASE EXCESS, ART: 4.3 MMOL/L (ref 0–2)
POSITIVE BASE EXCESS, ART: 5 (ref 0–3)
POTASSIUM SERPL-SCNC: 2.8 MMOL/L (ref 3.7–5.3)
POTASSIUM SERPL-SCNC: 3.3 MMOL/L (ref 3.7–5.3)
POTASSIUM SERPL-SCNC: 3.6 MMOL/L (ref 3.7–5.3)
PROTEIN UA: NEGATIVE
PROTHROMBIN TIME: 12.5 SEC (ref 9–12)
PSV: ABNORMAL
PT. POSITION: ABNORMAL
RBC # BLD: 2.85 M/UL (ref 3.95–5.11)
RBC # BLD: ABNORMAL 10*6/UL
RBC UA: NORMAL /HPF (ref 0–4)
RENAL EPITHELIAL, UA: NORMAL /HPF
RESPIRATORY RATE: ABNORMAL
SAMPLE SITE: ABNORMAL
SEG NEUTROPHILS: 96 % (ref 36–66)
SEGMENTED NEUTROPHILS ABSOLUTE COUNT: 5.57 K/UL (ref 1.8–7.7)
SET RATE: ABNORMAL
SODIUM BLD-SCNC: 137 MMOL/L (ref 135–144)
SODIUM BLD-SCNC: 139 MMOL/L (ref 135–144)
SODIUM BLD-SCNC: 140 MMOL/L (ref 135–144)
SPECIFIC GRAVITY UA: 1.01 (ref 1–1.03)
SPECIMEN DESCRIPTION: ABNORMAL
SPECIMEN DESCRIPTION: ABNORMAL
TCO2 (CALC), ART: 30 MMOL/L (ref 22–29)
TCO2 (CALC), ART: 30 MMOL/L (ref 22–29)
TEXT FOR RESPIRATORY: ABNORMAL
TOTAL HB: ABNORMAL G/DL (ref 12–16)
TOTAL PROTEIN: 5.4 G/DL (ref 6.4–8.3)
TOTAL PROTEIN: 5.8 G/DL (ref 6.4–8.3)
TOTAL RATE: ABNORMAL
TRICHOMONAS: NORMAL
TURBIDITY: CLEAR
URINE HGB: ABNORMAL
UROBILINOGEN, URINE: NORMAL
VT: ABNORMAL
WBC # BLD: 5.8 K/UL (ref 3.5–11.3)
WBC # BLD: ABNORMAL 10*3/UL
WBC UA: NORMAL /HPF (ref 0–5)
YEAST: NORMAL

## 2019-12-13 PROCEDURE — 82330 ASSAY OF CALCIUM: CPT

## 2019-12-13 PROCEDURE — 2580000003 HC RX 258: Performed by: SURGERY

## 2019-12-13 PROCEDURE — 6360000002 HC RX W HCPCS: Performed by: STUDENT IN AN ORGANIZED HEALTH CARE EDUCATION/TRAINING PROGRAM

## 2019-12-13 PROCEDURE — 83735 ASSAY OF MAGNESIUM: CPT

## 2019-12-13 PROCEDURE — 82947 ASSAY GLUCOSE BLOOD QUANT: CPT

## 2019-12-13 PROCEDURE — 3700000000 HC ANESTHESIA ATTENDED CARE

## 2019-12-13 PROCEDURE — 85025 COMPLETE CBC W/AUTO DIFF WBC: CPT

## 2019-12-13 PROCEDURE — 6360000002 HC RX W HCPCS: Performed by: SURGERY

## 2019-12-13 PROCEDURE — 3600000004 HC SURGERY LEVEL 4 BASE

## 2019-12-13 PROCEDURE — 87075 CULTR BACTERIA EXCEPT BLOOD: CPT

## 2019-12-13 PROCEDURE — 3700000001 HC ADD 15 MINUTES (ANESTHESIA)

## 2019-12-13 PROCEDURE — 2700000000 HC OXYGEN THERAPY PER DAY

## 2019-12-13 PROCEDURE — 3600000014 HC SURGERY LEVEL 4 ADDTL 15MIN

## 2019-12-13 PROCEDURE — 6360000002 HC RX W HCPCS: Performed by: NURSE ANESTHETIST, CERTIFIED REGISTERED

## 2019-12-13 PROCEDURE — 94664 DEMO&/EVAL PT USE INHALER: CPT

## 2019-12-13 PROCEDURE — 85610 PROTHROMBIN TIME: CPT

## 2019-12-13 PROCEDURE — 87205 SMEAR GRAM STAIN: CPT

## 2019-12-13 PROCEDURE — 88304 TISSUE EXAM BY PATHOLOGIST: CPT

## 2019-12-13 PROCEDURE — 71250 CT THORAX DX C-: CPT

## 2019-12-13 PROCEDURE — 85055 RETICULATED PLATELET ASSAY: CPT

## 2019-12-13 PROCEDURE — 80053 COMPREHEN METABOLIC PANEL: CPT

## 2019-12-13 PROCEDURE — 87181 SC STD AGAR DILUTION PER AGT: CPT

## 2019-12-13 PROCEDURE — 71045 X-RAY EXAM CHEST 1 VIEW: CPT

## 2019-12-13 PROCEDURE — 82803 BLOOD GASES ANY COMBINATION: CPT

## 2019-12-13 PROCEDURE — 87077 CULTURE AEROBIC IDENTIFY: CPT

## 2019-12-13 PROCEDURE — 2500000003 HC RX 250 WO HCPCS: Performed by: SURGERY

## 2019-12-13 PROCEDURE — P9047 ALBUMIN (HUMAN), 25%, 50ML: HCPCS | Performed by: SURGERY

## 2019-12-13 PROCEDURE — 2709999900 HC NON-CHARGEABLE SUPPLY

## 2019-12-13 PROCEDURE — 87070 CULTURE OTHR SPECIMN AEROBIC: CPT

## 2019-12-13 PROCEDURE — 94640 AIRWAY INHALATION TREATMENT: CPT

## 2019-12-13 PROCEDURE — 80048 BASIC METABOLIC PNL TOTAL CA: CPT

## 2019-12-13 PROCEDURE — 37799 UNLISTED PX VASCULAR SURGERY: CPT

## 2019-12-13 PROCEDURE — 94003 VENT MGMT INPAT SUBQ DAY: CPT

## 2019-12-13 PROCEDURE — 82805 BLOOD GASES W/O2 SATURATION: CPT

## 2019-12-13 PROCEDURE — 81001 URINALYSIS AUTO W/SCOPE: CPT

## 2019-12-13 PROCEDURE — 94770 HC ETCO2 MONITOR DAILY: CPT

## 2019-12-13 PROCEDURE — 2500000003 HC RX 250 WO HCPCS: Performed by: NURSE ANESTHETIST, CERTIFIED REGISTERED

## 2019-12-13 PROCEDURE — 2580000003 HC RX 258: Performed by: INTERNAL MEDICINE

## 2019-12-13 PROCEDURE — 2580000003 HC RX 258: Performed by: NURSE ANESTHETIST, CERTIFIED REGISTERED

## 2019-12-13 PROCEDURE — 84100 ASSAY OF PHOSPHORUS: CPT

## 2019-12-13 PROCEDURE — 94669 MECHANICAL CHEST WALL OSCILL: CPT

## 2019-12-13 PROCEDURE — 94761 N-INVAS EAR/PLS OXIMETRY MLT: CPT

## 2019-12-13 RX ORDER — ALBUMIN (HUMAN) 12.5 G/50ML
25 SOLUTION INTRAVENOUS ONCE
Status: COMPLETED | OUTPATIENT
Start: 2019-12-13 | End: 2019-12-13

## 2019-12-13 RX ORDER — ROCURONIUM BROMIDE 10 MG/ML
INJECTION, SOLUTION INTRAVENOUS PRN
Status: DISCONTINUED | OUTPATIENT
Start: 2019-12-13 | End: 2019-12-13 | Stop reason: SDUPTHER

## 2019-12-13 RX ORDER — MAGNESIUM HYDROXIDE 1200 MG/15ML
LIQUID ORAL CONTINUOUS PRN
Status: COMPLETED | OUTPATIENT
Start: 2019-12-13 | End: 2019-12-13

## 2019-12-13 RX ORDER — FUROSEMIDE 10 MG/ML
INJECTION INTRAMUSCULAR; INTRAVENOUS PRN
Status: DISCONTINUED | OUTPATIENT
Start: 2019-12-13 | End: 2019-12-13 | Stop reason: SDUPTHER

## 2019-12-13 RX ORDER — FUROSEMIDE 10 MG/ML
100 INJECTION INTRAMUSCULAR; INTRAVENOUS ONCE
Status: COMPLETED | OUTPATIENT
Start: 2019-12-13 | End: 2019-12-13

## 2019-12-13 RX ORDER — POTASSIUM CHLORIDE 7.45 MG/ML
INJECTION INTRAVENOUS PRN
Status: DISCONTINUED | OUTPATIENT
Start: 2019-12-13 | End: 2019-12-13 | Stop reason: SDUPTHER

## 2019-12-13 RX ORDER — HEPARIN SODIUM 1000 [USP'U]/ML
INJECTION, SOLUTION INTRAVENOUS; SUBCUTANEOUS PRN
Status: DISCONTINUED | OUTPATIENT
Start: 2019-12-13 | End: 2019-12-13 | Stop reason: SDUPTHER

## 2019-12-13 RX ADMIN — POTASSIUM CHLORIDE 20 MEQ: 29.8 INJECTION, SOLUTION INTRAVENOUS at 01:57

## 2019-12-13 RX ADMIN — PHENYLEPHRINE HYDROCHLORIDE 100 MCG: 10 INJECTION INTRAVENOUS at 12:59

## 2019-12-13 RX ADMIN — PHENYLEPHRINE HYDROCHLORIDE 50 MCG: 10 INJECTION INTRAVENOUS at 12:33

## 2019-12-13 RX ADMIN — FUROSEMIDE 100 MG: 10 INJECTION, SOLUTION INTRAMUSCULAR; INTRAVENOUS at 13:47

## 2019-12-13 RX ADMIN — PHENYLEPHRINE HYDROCHLORIDE 100 MCG: 10 INJECTION INTRAVENOUS at 12:24

## 2019-12-13 RX ADMIN — ALBUTEROL SULFATE 2.5 MG: 2.5 SOLUTION RESPIRATORY (INHALATION) at 08:29

## 2019-12-13 RX ADMIN — PHENYLEPHRINE HYDROCHLORIDE 100 MCG: 10 INJECTION INTRAVENOUS at 12:37

## 2019-12-13 RX ADMIN — PHENYLEPHRINE HYDROCHLORIDE 100 MCG: 10 INJECTION INTRAVENOUS at 12:41

## 2019-12-13 RX ADMIN — PHENYLEPHRINE HYDROCHLORIDE 100 MCG: 10 INJECTION INTRAVENOUS at 13:47

## 2019-12-13 RX ADMIN — PHENYLEPHRINE HYDROCHLORIDE 100 MCG: 10 INJECTION INTRAVENOUS at 12:47

## 2019-12-13 RX ADMIN — PHENYLEPHRINE HYDROCHLORIDE 100 MCG: 10 INJECTION INTRAVENOUS at 13:52

## 2019-12-13 RX ADMIN — PHENYLEPHRINE HYDROCHLORIDE 100 MCG: 10 INJECTION INTRAVENOUS at 13:35

## 2019-12-13 RX ADMIN — CALCIUM GLUCONATE 1 G: 98 INJECTION, SOLUTION INTRAVENOUS at 02:46

## 2019-12-13 RX ADMIN — PHENYLEPHRINE HYDROCHLORIDE 50 MCG: 10 INJECTION INTRAVENOUS at 13:16

## 2019-12-13 RX ADMIN — VASOPRESSIN 0.02 UNITS/MIN: 20 INJECTION INTRAVENOUS at 07:16

## 2019-12-13 RX ADMIN — PHENYLEPHRINE HYDROCHLORIDE 100 MCG: 10 INJECTION INTRAVENOUS at 12:54

## 2019-12-13 RX ADMIN — PHENYLEPHRINE HYDROCHLORIDE 50 MCG: 10 INJECTION INTRAVENOUS at 13:22

## 2019-12-13 RX ADMIN — PHENYLEPHRINE HYDROCHLORIDE 100 MCG: 10 INJECTION INTRAVENOUS at 13:05

## 2019-12-13 RX ADMIN — POTASSIUM CHLORIDE 20 MEQ: 29.8 INJECTION, SOLUTION INTRAVENOUS at 02:46

## 2019-12-13 RX ADMIN — PHENYLEPHRINE HYDROCHLORIDE 50 MCG: 10 INJECTION INTRAVENOUS at 13:40

## 2019-12-13 RX ADMIN — PHENYLEPHRINE HYDROCHLORIDE 25 MCG/MIN: 10 INJECTION INTRAVENOUS at 13:56

## 2019-12-13 RX ADMIN — PHENYLEPHRINE HYDROCHLORIDE 100 MCG: 10 INJECTION INTRAVENOUS at 14:37

## 2019-12-13 RX ADMIN — ALBUMIN (HUMAN) 25 G: 0.25 INJECTION, SOLUTION INTRAVENOUS at 01:41

## 2019-12-13 RX ADMIN — PHENYLEPHRINE HYDROCHLORIDE 100 MCG: 10 INJECTION INTRAVENOUS at 13:42

## 2019-12-13 RX ADMIN — PHENYLEPHRINE HYDROCHLORIDE 50 MCG: 10 INJECTION INTRAVENOUS at 12:19

## 2019-12-13 RX ADMIN — PHENYLEPHRINE HYDROCHLORIDE 100 MCG: 10 INJECTION INTRAVENOUS at 11:52

## 2019-12-13 RX ADMIN — SODIUM CHLORIDE: 9 INJECTION, SOLUTION INTRAVENOUS at 13:03

## 2019-12-13 RX ADMIN — PHENYLEPHRINE HYDROCHLORIDE 100 MCG: 10 INJECTION INTRAVENOUS at 11:47

## 2019-12-13 RX ADMIN — POTASSIUM CHLORIDE 10 MEQ: 7.46 INJECTION, SOLUTION INTRAVENOUS at 13:30

## 2019-12-13 RX ADMIN — ROCURONIUM BROMIDE 50 MG: 10 INJECTION INTRAVENOUS at 11:05

## 2019-12-13 RX ADMIN — POTASSIUM CHLORIDE 10 MEQ: 7.46 INJECTION, SOLUTION INTRAVENOUS at 14:04

## 2019-12-13 RX ADMIN — HEPARIN SODIUM 30000 UNITS: 1000 INJECTION INTRAVENOUS; SUBCUTANEOUS at 14:35

## 2019-12-13 RX ADMIN — PHENYLEPHRINE HYDROCHLORIDE 100 MCG: 10 INJECTION INTRAVENOUS at 13:27

## 2019-12-13 RX ADMIN — ALBUTEROL SULFATE 2.5 MG: 2.5 SOLUTION RESPIRATORY (INHALATION) at 04:28

## 2019-12-13 RX ADMIN — FUROSEMIDE 100 MG: 10 INJECTION, SOLUTION INTRAMUSCULAR; INTRAVENOUS at 03:20

## 2019-12-13 RX ADMIN — LEVOFLOXACIN 750 MG: 5 INJECTION, SOLUTION INTRAVENOUS at 07:21

## 2019-12-13 RX ADMIN — PHENYLEPHRINE HYDROCHLORIDE 100 MCG: 10 INJECTION INTRAVENOUS at 11:29

## 2019-12-13 RX ADMIN — DOBUTAMINE HYDROCHLORIDE 2.5 MCG/KG/MIN: 400 INJECTION INTRAVENOUS at 01:22

## 2019-12-13 RX ADMIN — LEVOFLOXACIN 750 MG: 5 INJECTION, SOLUTION INTRAVENOUS at 01:30

## 2019-12-13 RX ADMIN — PHENYLEPHRINE HYDROCHLORIDE 100 MCG: 10 INJECTION INTRAVENOUS at 11:37

## 2019-12-13 RX ADMIN — PHENYLEPHRINE HYDROCHLORIDE 50 MCG: 10 INJECTION INTRAVENOUS at 13:12

## 2019-12-13 ASSESSMENT — PULMONARY FUNCTION TESTS
PIF_VALUE: 20
PIF_VALUE: 23
PIF_VALUE: 20
PIF_VALUE: 20
PIF_VALUE: 23
PIF_VALUE: 26
PIF_VALUE: 20
PIF_VALUE: 21
PIF_VALUE: 20
PIF_VALUE: 21
PIF_VALUE: 20
PIF_VALUE: 22
PIF_VALUE: 22
PIF_VALUE: 20
PIF_VALUE: 22
PIF_VALUE: 20
PIF_VALUE: 21
PIF_VALUE: 21
PIF_VALUE: 20
PIF_VALUE: 21
PIF_VALUE: 26
PIF_VALUE: 20
PIF_VALUE: 21
PIF_VALUE: 20
PIF_VALUE: 22
PIF_VALUE: 23
PIF_VALUE: 20
PIF_VALUE: 21
PIF_VALUE: 20
PIF_VALUE: 20
PIF_VALUE: 22
PIF_VALUE: 20
PIF_VALUE: 30
PIF_VALUE: 12
PIF_VALUE: 20
PIF_VALUE: 22
PIF_VALUE: 20
PIF_VALUE: 21
PIF_VALUE: 21
PIF_VALUE: 22
PIF_VALUE: 14
PIF_VALUE: 20
PIF_VALUE: 21
PIF_VALUE: 24
PIF_VALUE: 20
PIF_VALUE: 9
PIF_VALUE: 21
PIF_VALUE: 21
PIF_VALUE: 22
PIF_VALUE: 21
PIF_VALUE: 22
PIF_VALUE: 20
PIF_VALUE: 21
PIF_VALUE: 21
PIF_VALUE: 20
PIF_VALUE: 30
PIF_VALUE: 20
PIF_VALUE: 20
PIF_VALUE: 31
PIF_VALUE: 21
PIF_VALUE: 20
PIF_VALUE: 21
PIF_VALUE: 20
PIF_VALUE: 22
PIF_VALUE: 20
PIF_VALUE: 22
PIF_VALUE: 23
PIF_VALUE: 21
PIF_VALUE: 20
PIF_VALUE: 21
PIF_VALUE: 27
PIF_VALUE: 20
PIF_VALUE: 20
PIF_VALUE: 21
PIF_VALUE: 20
PIF_VALUE: 23
PIF_VALUE: 20
PIF_VALUE: 4
PIF_VALUE: 21
PIF_VALUE: 20
PIF_VALUE: 23
PIF_VALUE: 21
PIF_VALUE: 20
PIF_VALUE: 22
PIF_VALUE: 20
PIF_VALUE: 14
PIF_VALUE: 20
PIF_VALUE: 20
PIF_VALUE: 38
PIF_VALUE: 20
PIF_VALUE: 24
PIF_VALUE: 22
PIF_VALUE: 14
PIF_VALUE: 20
PIF_VALUE: 21
PIF_VALUE: 22
PIF_VALUE: 22
PIF_VALUE: 20
PIF_VALUE: 21
PIF_VALUE: 20
PIF_VALUE: 21
PIF_VALUE: 21
PIF_VALUE: 20
PIF_VALUE: 22
PIF_VALUE: 20
PIF_VALUE: 28
PIF_VALUE: 20
PIF_VALUE: 22
PIF_VALUE: 21
PIF_VALUE: 20
PIF_VALUE: 14
PIF_VALUE: 22
PIF_VALUE: 24
PIF_VALUE: 20
PIF_VALUE: 14
PIF_VALUE: 14
PIF_VALUE: 22
PIF_VALUE: 23
PIF_VALUE: 20
PIF_VALUE: 20
PIF_VALUE: 21
PIF_VALUE: 20
PIF_VALUE: 23
PIF_VALUE: 22
PIF_VALUE: 20
PIF_VALUE: 22
PIF_VALUE: 21
PIF_VALUE: 20
PIF_VALUE: 20
PIF_VALUE: 21
PIF_VALUE: 22
PIF_VALUE: 23
PIF_VALUE: 20
PIF_VALUE: 35
PIF_VALUE: 22
PIF_VALUE: 21
PIF_VALUE: 20
PIF_VALUE: 22
PIF_VALUE: 14
PIF_VALUE: 35
PIF_VALUE: 20
PIF_VALUE: 22
PIF_VALUE: 20
PIF_VALUE: 22
PIF_VALUE: 20
PIF_VALUE: 21
PIF_VALUE: 14
PIF_VALUE: 22
PIF_VALUE: 17
PIF_VALUE: 17
PIF_VALUE: 21
PIF_VALUE: 20
PIF_VALUE: 20
PIF_VALUE: 30
PIF_VALUE: 20
PIF_VALUE: 21
PIF_VALUE: 13
PIF_VALUE: 22
PIF_VALUE: 31
PIF_VALUE: 23
PIF_VALUE: 22
PIF_VALUE: 23
PIF_VALUE: 22
PIF_VALUE: 23
PIF_VALUE: 22
PIF_VALUE: 20
PIF_VALUE: 14
PIF_VALUE: 22
PIF_VALUE: 21
PIF_VALUE: 16
PIF_VALUE: 20
PIF_VALUE: 21
PIF_VALUE: 23
PIF_VALUE: 27
PIF_VALUE: 21
PIF_VALUE: 21
PIF_VALUE: 22
PIF_VALUE: 23
PIF_VALUE: 20
PIF_VALUE: 21
PIF_VALUE: 22
PIF_VALUE: 14
PIF_VALUE: 21
PIF_VALUE: 23
PIF_VALUE: 21
PIF_VALUE: 20
PIF_VALUE: 23
PIF_VALUE: 22
PIF_VALUE: 20
PIF_VALUE: 21
PIF_VALUE: 20
PIF_VALUE: 22
PIF_VALUE: 21
PIF_VALUE: 21
PIF_VALUE: 14
PIF_VALUE: 22
PIF_VALUE: 14
PIF_VALUE: 22
PIF_VALUE: 20
PIF_VALUE: 20
PIF_VALUE: 21
PIF_VALUE: 20
PIF_VALUE: 20
PIF_VALUE: 18
PIF_VALUE: 20
PIF_VALUE: 20
PIF_VALUE: 22
PIF_VALUE: 28
PIF_VALUE: 21
PIF_VALUE: 20
PIF_VALUE: 23
PIF_VALUE: 22
PIF_VALUE: 20
PIF_VALUE: 19
PIF_VALUE: 20
PIF_VALUE: 22
PIF_VALUE: 20
PIF_VALUE: 14
PIF_VALUE: 20
PIF_VALUE: 23
PIF_VALUE: 20
PIF_VALUE: 20
PIF_VALUE: 23
PIF_VALUE: 21
PIF_VALUE: 23
PIF_VALUE: 20
PIF_VALUE: 22
PIF_VALUE: 21
PIF_VALUE: 20
PIF_VALUE: 20
PIF_VALUE: 36
PIF_VALUE: 20
PIF_VALUE: 22
PIF_VALUE: 20
PIF_VALUE: 23
PIF_VALUE: 20
PIF_VALUE: 20
PIF_VALUE: 21
PIF_VALUE: 20
PIF_VALUE: 22
PIF_VALUE: 20

## 2019-12-13 NOTE — PLAN OF CARE
PROVIDE ADEQUATE OXYGENATION WITH ACCEPTABLE SP02/ABG'S    [x]  IDENTIFY APPROPRIATE OXYGEN THERAPY  [x]   MONITOR SP02/ABG'S AS NEEDED   [x]   PATIENT EDUCATION AS NEEDED   BRONCHOSPASM/BRONCHOCONSTRICTION     [x]         IMPROVE AERATION/BREATH SOUNDS  [x]   ADMINISTER BRONCHODILATOR THERAPY AS APPROPRIATE  [x]   ASSESS BREATH SOUNDS  [x]   IMPLEMENT AEROSOL/MDI PROTOCOL  [x]   PATIENT EDUCATION AS NEEDED

## 2019-12-15 LAB
ABO/RH: NORMAL
ANTIBODY SCREEN: NEGATIVE
ARM BAND NUMBER: NORMAL
BLD PROD TYP BPU: NORMAL
BLD PROD TYP BPU: NORMAL
CROSSMATCH RESULT: NORMAL
CROSSMATCH RESULT: NORMAL
CULTURE: NORMAL
CULTURE: NORMAL
DISPENSE STATUS BLOOD BANK: NORMAL
DISPENSE STATUS BLOOD BANK: NORMAL
EXPIRATION DATE: NORMAL
Lab: NORMAL
Lab: NORMAL
SPECIMEN DESCRIPTION: NORMAL
SPECIMEN DESCRIPTION: NORMAL
TRANSFUSION STATUS: NORMAL
TRANSFUSION STATUS: NORMAL
UNIT DIVISION: 0
UNIT DIVISION: 0
UNIT NUMBER: NORMAL
UNIT NUMBER: NORMAL

## 2019-12-16 LAB
CULTURE: ABNORMAL
CULTURE: ABNORMAL
DIRECT EXAM: ABNORMAL
DIRECT EXAM: ABNORMAL
Lab: ABNORMAL
SPECIMEN DESCRIPTION: ABNORMAL

## 2019-12-17 LAB — SURGICAL PATHOLOGY REPORT: NORMAL

## 2019-12-18 LAB
CULTURE: ABNORMAL
CULTURE: ABNORMAL
CULTURE: NORMAL
CULTURE: NORMAL
DIRECT EXAM: ABNORMAL
DIRECT EXAM: ABNORMAL
Lab: ABNORMAL
Lab: NORMAL
Lab: NORMAL
SPECIMEN DESCRIPTION: ABNORMAL
SPECIMEN DESCRIPTION: NORMAL
SPECIMEN DESCRIPTION: NORMAL

## 2019-12-20 LAB
CULTURE: NORMAL
CULTURE: NORMAL
DIRECT EXAM: NORMAL
DIRECT EXAM: NORMAL
Lab: NORMAL
Lab: NORMAL
SPECIMEN DESCRIPTION: NORMAL
SPECIMEN DESCRIPTION: NORMAL

## 2019-12-26 NOTE — CONSULTS
[facility] Ethics Consultation Form  Report to [address] [place] [address]    MR# [id number]   Ethics Committee Representatives: Steph Murdock MD  Date of Consult: 12/10/2019   Date of Admission: 12/9/2019     Referral Source (doctor, nurse, etc.)  Unit MICU RN    Medical Background: The patient is a 46 y.o. female who has no past medical history on file. . Admitted to xgeifiru94/9/2019. Cardiac arrest [facility]) [I46.9] was the admission diagnosis. She was found down after unknown time and received multiple rounds of CPR. CT head concerning for diffuse cerebral edema consistent with anoxic brain injury.   On exam patient was intubated, off sedation on hypothermia protocol not on any paralytic agent. Pupils dilated and nonreactive. Corneals absent. No cough or gag. No response to painful stimuli. What are the requester's ethical concerns? What are his/her recommendations? What decisions need to be made? How soon do they need to be made? The concerns are related to legal surrogate determination. Patient and family wishes, values and goals: The patient has no advanced directives. She has 3 adult daughters. Process (conversation with patient, family, doctor, nurse, care coordinator, ethics committee member, pastoral care, consultants, risk, others):  I reviewed the chart and spoke with the bedside nurse to understand the clinical situation and the questions and concerns they have. Recommendations: The daughters can defer their legal responsibility to one of the daughters. This can be done on the phone with one witness.       Thank you for the opportunity to serve the patient, family and medical team. Clear bilaterally, pupils equal, round and reactive to light.

## 2020-01-03 LAB
EKG ATRIAL RATE: 90 BPM
EKG Q-T INTERVAL: 484 MS
EKG QRS DURATION: 136 MS
EKG QTC CALCULATION (BAZETT): 579 MS
EKG R AXIS: 58 DEGREES
EKG T AXIS: -47 DEGREES
EKG VENTRICULAR RATE: 86 BPM

## 2020-01-03 PROCEDURE — 93010 ELECTROCARDIOGRAM REPORT: CPT | Performed by: INTERNAL MEDICINE

## 2020-08-18 NOTE — PROGRESS NOTES
[address] [facility] [address]  Dept: [phone]  Dept Fax: [phone]    Yaima Ogden is a 40 y.o. female who presents today for her medical conditions/complaints as noted below. Yaima Ogden is c/o of   Chief Complaint   Patient presents with    Anxiety     pt states that she has trouble focusing. going to school for medical imaging but her anxiety seems worse         HPI:     HPI    Patient state now that she is back at school she reports feeling more hyper and also having issues with concentrating on focused tasks. She has noticed this in multiple classes and is concerned of a learning disability/attention disorder  She feels she is easily distracted  She reports ho hx of diagnosis of attention   She states using xanax mainly for sleep  Mom here with patient reporting she notices these actions at home as well. Has for years.      Hemoglobin A1C (%)   Date Value   2014 4.9             ( goal A1C is < 7)   No results found for: LABMICR  No results found for: LDLCHOLESTEROL, LDLCALC    (goal LDL is <100)   AST (U/L)   Date Value   2017 16     ALT (U/L)   Date Value   2017 15     BUN (mg/dL)   Date Value   2017 10     BP Readings from Last 3 Encounters:   18 116/82   17 113/69   17 118/60          (goal 120/80)    Past Medical History:   Diagnosis Date    Anemia     treated at [facility] 2014    Compression fracture     Depression 2014    Enlarged uterus     Hypertension     Hypertension     Menorrhagia     Smoker     Vitamin D deficiency 10/28/2015    Wedge compression fracture of T11 vertebra [facility])       Past Surgical History:   Procedure Laterality Date     SECTION      [place]   [employer] ENDOMETRIAL ABLATION  14    [facility]    GASTRIC BYPASS SURGERY      16    HEMORRHOID SURGERY N/A     HYSTEROSCOPY 15    endometrial ablaation    TOENAIL EXCISION Bilateral 2016    TONSILLECTOMY  age 3    [facility]       Family History   Problem Relation Age of Onset    Diabetes Mother     High Blood Pressure Mother     Heart Disease Father      stents placed    High Blood Pressure Father     Hypotension Sister     Breast Cancer Maternal Grandmother     Cervical Cancer Maternal Grandmother     Other Maternal Grandfather      Drowning accident    Breast Cancer Paternal Grandmother     Heart Disease Paternal Grandfather     Cancer Neg Hx     Colon Cancer Neg Hx     Eclampsia Neg Hx     Hypertension Neg Hx     Ovarian Cancer Neg Hx      Labor Neg Hx     Spont Abortions Neg Hx     Stroke Neg Hx        Social History   Substance Use Topics    Smoking status: Current Every Day Smoker     Packs/day: 1.00     Years: 15.00     Types: Cigarettes    Smokeless tobacco: Never Used      Comment: stopped 2016    Alcohol use Yes      Comment: socially      Current Outpatient Prescriptions   Medication Sig Dispense Refill    ALPRAZolam (XANAX) 0.5 MG tablet Take 1 tablet by mouth nightly as needed for Sleep or Anxiety for up to 30 days. 20 tablet 0    Pediatric Multivitamins-Iron (MULTIVITAMINS PLUS IRON CHILD) 18 MG CHEW Take 1 tablet by mouth 2 times daily 180 tablet 3    ondansetron (ZOFRAN ODT) 4 MG disintegrating tablet Take 1 tablet by mouth every 8 hours as needed for Nausea or Vomiting 20 tablet 0    famotidine (PEPCID) 20 MG tablet   2    cyanocobalamin 1000 MCG/ML injection INJECT 1 ML INTO THE APPROPRIATE MUSCLE Q 14 DAYS  11    Cholecalciferol (VITAMIN D3) 50952 units CAPS Take 50,000 Units by mouth      B-D 3CC LUER-VIKTORIYA SYR 25GX1/2\" 25G X 1-1/2\" 3 ML MISC U UTD Q 14 DAYS FOR 1 DOSE  11    Cyanocobalamin (VITAMIN B 12 PO) Take by mouth      BIOTIN PO Take 10,000 mcg by mouth daily       No current facility-administered medications for this visit. Allergies   Allergen Reactions    Keflex [Cephalexin]     Nsaids     Other      Plastic Tape    Pcn [Penicillins]        Health Maintenance   Topic Date Due    HIV screen  09/05/1996    Flu vaccine (1) 01/01/2019 (Originally 9/1/2017)    Pneumococcal med risk (1 of 1 - PPSV23) 01/19/2019 (Originally 9/5/2000)    Cervical cancer screen  09/19/2019    DTaP/Tdap/Td vaccine (2 - Td) 10/19/2027       Subjective:      Review of Systems   Constitutional: Negative for activity change, appetite change, fatigue, fever and unexpected weight change. /82 (Site: Right Arm, Position: Sitting, Cuff Size: Medium Adult)   Pulse 65   Ht 5' 10\" (1.778 m)   Wt 173 lb (78.5 kg)   LMP 12/17/2017   SpO2 99%   BMI 24.82 kg/m²    Respiratory: Negative for cough, shortness of breath and wheezing. Cardiovascular: Negative for chest pain and palpitations. Skin: Negative for color change, pallor, rash and wound. Neurological: Negative for weakness. Hematological: Negative for adenopathy. Psychiatric/Behavioral: Positive for decreased concentration. Negative for agitation, confusion and sleep disturbance. The patient is not nervous/anxious. Objective:     Physical Exam   Constitutional: She appears well-developed and well-nourished. Cardiovascular: Normal rate, regular rhythm and normal heart sounds. No murmur heard. Pulmonary/Chest: Effort normal and breath sounds normal. No respiratory distress. She has no wheezes. She has no rales. Abdominal: Soft. Skin: Skin is warm and dry. No rash noted. No erythema. No pallor. Psychiatric: She has a normal mood and affect. Her behavior is normal. Judgment and thought content normal.   Nursing note and vitals reviewed. /82 (Site: Right Arm, Position: Sitting, Cuff Size: Medium Adult)   Pulse 65   Ht 5' 10\" (1.778 m)   Wt 173 lb (78.5 kg)   LMP 12/17/2017   SpO2 99%   BMI 24.82 kg/m²     Assessment:      1.  Concentration deficit 18-Aug-2020 17:59

## (undated) DEVICE — YANKAUER,FLEXIBLE HANDLE,REGLR CAPACITY: Brand: MEDLINE INDUSTRIES, INC.

## (undated) DEVICE — CONTAINER,SPECIMEN,4OZ,OR STRL: Brand: MEDLINE

## (undated) DEVICE — DRAPE C ARM UNIV W41XL74IN CLR PLAS XR VELC CLSR POLY STRP

## (undated) DEVICE — SUTURE PERMA-HAND SZ 2-0 L30IN NONABSORBABLE BLK L26MM SH K833H

## (undated) DEVICE — STAPLER INT L60MM REG TISS BLU B FRM 8 FIRING 2 ROW AUTO

## (undated) DEVICE — GLOVE SURG SZ 6 THK91MIL LTX FREE SYN POLYISOPRENE ANTI

## (undated) DEVICE — GLOVE ORANGE PI 8   MSG9080

## (undated) DEVICE — SUTURE PERMAHAND SZ 0 L30IN NONABSORBABLE BLK SILK BRAID A306H

## (undated) DEVICE — GOWN,AURORA,NONRNF,XL,30/CS: Brand: MEDLINE

## (undated) DEVICE — ELECTRODE PT RET AD L9FT HI MOIST COND ADH HYDRGEL CORDED

## (undated) DEVICE — SYRINGE MED 50ML LUERLOCK TIP

## (undated) DEVICE — PACK PROCEDURE SURG FACILITY SPEC GI BWL SPT SVMMC

## (undated) DEVICE — SPONGE LAP W18XL18IN WHT COT 4 PLY FLD STRUNG RADPQ DISP ST

## (undated) DEVICE — CONNECTOR,TUBING,5-IN-1,NON-STERILE: Brand: MEDLINE INDUSTRIES, INC.

## (undated) DEVICE — GLOVE ORANGE PI 7   MSG9070

## (undated) DEVICE — STANDARD HYPODERMIC NEEDLE,POLYPROPYLENE HUB: Brand: MONOJECT

## (undated) DEVICE — SUTURE PERMAHAND SZ 2-0 L12X18IN NONABSORBABLE BLK SILK A185H

## (undated) DEVICE — PROVIDES A STERILE INTERFACE BETWEEN THE OPERATING ROOM SURGICAL LAMPS (NON-STERILE) AND THE SURGEON OR NURSE (STERILE).: Brand: STERION®CLAMP COVER FABRIC

## (undated) DEVICE — ACETONE 4 LITER CONTAINER

## (undated) DEVICE — SUTURE PDS II SZ 0 L60IN ABSRB VLT L48MM CTX 1/2 CIR Z990G

## (undated) DEVICE — SUTURE PERMA-HAND 0 L18IN NONABSORBABLE BLK CT-1 L36MM 1/2 C021D

## (undated) DEVICE — SYRINGE MED 10ML TRNSLUC BRL PLUNG BLK MRK POLYPR CTRL

## (undated) DEVICE — GLOVE SURG SZ 65 THK91MIL LTX FREE SYN POLYISOPRENE

## (undated) DEVICE — DRAPE,LAP,CHOLE,W/TROUGHS,STERILE: Brand: MEDLINE

## (undated) DEVICE — TUBING, SUCTION, 9/32" X 20', STRAIGHT: Brand: MEDLINE INDUSTRIES, INC.

## (undated) DEVICE — DRAPE,UTILTY,TAPE,15X26, 4EA/PK: Brand: MEDLINE

## (undated) DEVICE — SUTURE NONABSORBABLE  MERSILINE TP1 SIZE 5

## (undated) DEVICE — SUTURE PERMAHAND SZ 0 L30IN NONABSORBABLE BLK L26MM SH 1/2 K834H

## (undated) DEVICE — GLOVE EXAM M L95IN FNGR THK35MIL PALM THK24MIL OFF WHT

## (undated) DEVICE — COVER OR TBL W40XL90IN ABSRB STD AND GRIPPY BK SAHARA

## (undated) DEVICE — 3M™ IOBAN™ 2 ANTIMICROBIAL INCISE DRAPE 6651EZ: Brand: IOBAN™ 2

## (undated) DEVICE — GLOVE ORANGE PI 7 1/2   MSG9075

## (undated) DEVICE — YANKAUER,POOLE TIP,STERILE,50/CS: Brand: MEDLINE

## (undated) DEVICE — SHEET DRAPE FULL 70X100

## (undated) DEVICE — DRAPE SLUSH DISC W44XL66IN ST FOR RND BSIN HUSH SLUSH SYS

## (undated) DEVICE — CHLORAPREP 26ML ORANGE

## (undated) DEVICE — SYRINGE MED 1ML POLYCARB LUERLOCK HUB

## (undated) DEVICE — PACK SURG ABD SVMMC

## (undated) DEVICE — LOOP VES W25MM THK1MM MAXI RED SIL FLD REPELLENT 100 PER

## (undated) DEVICE — DRAPE,REIN 53X77,STERILE: Brand: MEDLINE

## (undated) DEVICE — COVER LT HNDL BLU PLAS

## (undated) DEVICE — WAX SURG 2.5GM HEMSTAT BNE BEESWAX PARAFFIN ISO PALMITATE

## (undated) DEVICE — ELECTROSURGICAL PENCIL BUTTON SWITCH E-Z CLEAN COATED BLADE ELECTRODE 10 FT (3 M) CORD HOLSTER: Brand: MEGADYNE

## (undated) DEVICE — TOWEL,OR,DSP,ST,NATURAL,DLX,4/PK,20PK/CS: Brand: MEDLINE

## (undated) DEVICE — SUTURE PERMAHAND SZ 4-0 L18IN NONABSORBABLE BLK SILK BRAID A183H

## (undated) DEVICE — INTENDED FOR TISSUE SEPARATION, AND OTHER PROCEDURES THAT REQUIRE A SHARP SURGICAL BLADE TO PUNCTURE OR CUT.: Brand: BARD-PARKER ® CARBON RIB-BACK BLADES

## (undated) DEVICE — GOWN,AURORA,NONREINFORCED,LARGE: Brand: MEDLINE